# Patient Record
Sex: MALE | Race: WHITE | NOT HISPANIC OR LATINO | Employment: FULL TIME | URBAN - METROPOLITAN AREA
[De-identification: names, ages, dates, MRNs, and addresses within clinical notes are randomized per-mention and may not be internally consistent; named-entity substitution may affect disease eponyms.]

---

## 2020-02-28 ENCOUNTER — OFFICE VISIT (OUTPATIENT)
Dept: FAMILY MEDICINE | Facility: CLINIC | Age: 50
End: 2020-02-28
Payer: COMMERCIAL

## 2020-02-28 VITALS
HEART RATE: 95 BPM | WEIGHT: 221 LBS | OXYGEN SATURATION: 98 % | DIASTOLIC BLOOD PRESSURE: 122 MMHG | TEMPERATURE: 98 F | SYSTOLIC BLOOD PRESSURE: 196 MMHG | RESPIRATION RATE: 14 BRPM | BODY MASS INDEX: 36.82 KG/M2 | HEIGHT: 65 IN

## 2020-02-28 DIAGNOSIS — I10 HYPERTENSION, UNSPECIFIED TYPE: Primary | ICD-10-CM

## 2020-02-28 ASSESSMENT — MIFFLIN-ST. JEOR: SCORE: 1794.33

## 2020-02-28 NOTE — PROGRESS NOTES
Patient came in today for a blood pressure check. Pt complains of high blood pressure, blood in urine and nose bleeds off and on for the past two weeks. Pt states he has not been taking his blood pressure medication for over a year. Happens more when stressed at work. Pt denies any chest pain. After taking patients blood pressure (196/122) spoke with provider. Provider advised pt should go to the ER.     Informed pt that due to his elevated blood pressure. Pt understood and was informed [facility] would be the closest hospital.     Sandra Rose, CMA

## 2020-02-28 NOTE — NURSING NOTE
Patient came in today for a blood pressure check. Pt complains of high blood pressure, blood in urine and nose bleeds off and on for the past two weeks. Pt states he has not been taking his blood pressure medication for over a year. Happens more when stressed at work. After taking patients blood pressure (196/122) spoke with provider. Provider advised pt should go to the ER.     Informed pt that due to his elevated blood pressure. Pt understood and was informed [facility] would be the closest hospital.     Sandra Rose, CMA

## 2020-03-02 ENCOUNTER — HOSPITAL ENCOUNTER (EMERGENCY)
Facility: CLINIC | Age: 50
Discharge: HOME | End: 2020-03-02
Attending: FAMILY MEDICINE | Admitting: FAMILY MEDICINE
Payer: COMMERCIAL

## 2020-03-02 ENCOUNTER — APPOINTMENT (OUTPATIENT)
Dept: CT IMAGING | Facility: CLINIC | Age: 50
End: 2020-03-02
Attending: FAMILY MEDICINE
Payer: COMMERCIAL

## 2020-03-02 VITALS
RESPIRATION RATE: 22 BRPM | SYSTOLIC BLOOD PRESSURE: 176 MMHG | OXYGEN SATURATION: 100 % | TEMPERATURE: 96.8 F | HEART RATE: 86 BPM | DIASTOLIC BLOOD PRESSURE: 122 MMHG

## 2020-03-02 DIAGNOSIS — I10 BENIGN ESSENTIAL HYPERTENSION: ICD-10-CM

## 2020-03-02 DIAGNOSIS — I10 HYPERTENSION, UNSPECIFIED TYPE: ICD-10-CM

## 2020-03-02 DIAGNOSIS — R31.29 MICROSCOPIC HEMATURIA: ICD-10-CM

## 2020-03-02 DIAGNOSIS — N20.0 NEPHROLITHIASIS: ICD-10-CM

## 2020-03-02 LAB
ALBUMIN SERPL-MCNC: 4.1 G/DL (ref 3.4–5)
ALBUMIN UR-MCNC: 10 MG/DL
ALP SERPL-CCNC: 166 U/L (ref 40–150)
ALT SERPL W P-5'-P-CCNC: 26 U/L (ref 0–70)
ANION GAP SERPL CALCULATED.3IONS-SCNC: 7 MMOL/L (ref 3–14)
APPEARANCE UR: ABNORMAL
APTT PPP: 33 SEC (ref 22–37)
AST SERPL W P-5'-P-CCNC: 22 U/L (ref 0–45)
BACTERIA #/AREA URNS HPF: ABNORMAL /HPF
BASOPHILS # BLD AUTO: 0 10E9/L (ref 0–0.2)
BASOPHILS NFR BLD AUTO: 0.1 %
BILIRUB SERPL-MCNC: 0.4 MG/DL (ref 0.2–1.3)
BILIRUB UR QL STRIP: NEGATIVE
BUN SERPL-MCNC: 14 MG/DL (ref 7–30)
CALCIUM SERPL-MCNC: 9.1 MG/DL (ref 8.5–10.1)
CAOX CRY #/AREA URNS HPF: ABNORMAL /HPF
CHLORIDE SERPL-SCNC: 108 MMOL/L (ref 94–109)
CO2 SERPL-SCNC: 26 MMOL/L (ref 20–32)
COLOR UR AUTO: YELLOW
CREAT SERPL-MCNC: 0.6 MG/DL (ref 0.66–1.25)
DIFFERENTIAL METHOD BLD: NORMAL
EOSINOPHIL # BLD AUTO: 0.2 10E9/L (ref 0–0.7)
EOSINOPHIL NFR BLD AUTO: 2.1 %
ERYTHROCYTE [DISTWIDTH] IN BLOOD BY AUTOMATED COUNT: 13.5 % (ref 10–15)
GFR SERPL CREATININE-BSD FRML MDRD: >90 ML/MIN/{1.73_M2}
GLUCOSE SERPL-MCNC: 103 MG/DL (ref 70–99)
GLUCOSE UR STRIP-MCNC: NEGATIVE MG/DL
HCT VFR BLD AUTO: 46.4 % (ref 40–53)
HGB BLD-MCNC: 15.7 G/DL (ref 13.3–17.7)
HGB UR QL STRIP: ABNORMAL
IMM GRANULOCYTES # BLD: 0 10E9/L (ref 0–0.4)
IMM GRANULOCYTES NFR BLD: 0.3 %
INR PPP: 0.99 (ref 0.86–1.14)
KETONES UR STRIP-MCNC: NEGATIVE MG/DL
LEUKOCYTE ESTERASE UR QL STRIP: ABNORMAL
LYMPHOCYTES # BLD AUTO: 2.5 10E9/L (ref 0.8–5.3)
LYMPHOCYTES NFR BLD AUTO: 31.3 %
MCH RBC QN AUTO: 28.9 PG (ref 26.5–33)
MCHC RBC AUTO-ENTMCNC: 33.8 G/DL (ref 31.5–36.5)
MCV RBC AUTO: 86 FL (ref 78–100)
MONOCYTES # BLD AUTO: 0.4 10E9/L (ref 0–1.3)
MONOCYTES NFR BLD AUTO: 5.4 %
MUCOUS THREADS #/AREA URNS LPF: PRESENT /LPF
NEUTROPHILS # BLD AUTO: 4.8 10E9/L (ref 1.6–8.3)
NEUTROPHILS NFR BLD AUTO: 60.8 %
NITRATE UR QL: NEGATIVE
NRBC # BLD AUTO: 0 10*3/UL
NRBC BLD AUTO-RTO: 0 /100
PH UR STRIP: 5.5 PH (ref 5–7)
PLATELET # BLD AUTO: 240 10E9/L (ref 150–450)
POTASSIUM SERPL-SCNC: 4.3 MMOL/L (ref 3.4–5.3)
PROT SERPL-MCNC: 7.9 G/DL (ref 6.8–8.8)
RBC # BLD AUTO: 5.43 10E12/L (ref 4.4–5.9)
RBC #/AREA URNS AUTO: 107 /HPF (ref 0–2)
SODIUM SERPL-SCNC: 141 MMOL/L (ref 133–144)
SOURCE: ABNORMAL
SP GR UR STRIP: 1.02 (ref 1–1.03)
SQUAMOUS #/AREA URNS AUTO: 9 /HPF (ref 0–1)
UROBILINOGEN UR STRIP-MCNC: 2 MG/DL (ref 0–2)
WBC # BLD AUTO: 8 10E9/L (ref 4–11)
WBC #/AREA URNS AUTO: 35 /HPF (ref 0–5)

## 2020-03-02 PROCEDURE — 81001 URINALYSIS AUTO W/SCOPE: CPT | Performed by: FAMILY MEDICINE

## 2020-03-02 PROCEDURE — 99284 EMERGENCY DEPT VISIT MOD MDM: CPT | Mod: 25 | Performed by: FAMILY MEDICINE

## 2020-03-02 PROCEDURE — 80053 COMPREHEN METABOLIC PANEL: CPT | Performed by: FAMILY MEDICINE

## 2020-03-02 PROCEDURE — 99284 EMERGENCY DEPT VISIT MOD MDM: CPT | Mod: Z6 | Performed by: FAMILY MEDICINE

## 2020-03-02 PROCEDURE — 85025 COMPLETE CBC W/AUTO DIFF WBC: CPT | Performed by: FAMILY MEDICINE

## 2020-03-02 PROCEDURE — 85610 PROTHROMBIN TIME: CPT | Performed by: FAMILY MEDICINE

## 2020-03-02 PROCEDURE — 25000132 ZZH RX MED GY IP 250 OP 250 PS 637: Performed by: FAMILY MEDICINE

## 2020-03-02 PROCEDURE — 74176 CT ABD & PELVIS W/O CONTRAST: CPT

## 2020-03-02 PROCEDURE — 85730 THROMBOPLASTIN TIME PARTIAL: CPT | Performed by: FAMILY MEDICINE

## 2020-03-02 PROCEDURE — 96360 HYDRATION IV INFUSION INIT: CPT | Performed by: FAMILY MEDICINE

## 2020-03-02 PROCEDURE — 25800030 ZZH RX IP 258 OP 636: Performed by: FAMILY MEDICINE

## 2020-03-02 RX ORDER — METOPROLOL SUCCINATE 50 MG/1
50 TABLET, EXTENDED RELEASE ORAL DAILY
Qty: 30 TABLET | Refills: 0 | Status: SHIPPED | OUTPATIENT
Start: 2020-03-02 | End: 2020-07-16

## 2020-03-02 RX ORDER — METOPROLOL TARTRATE 25 MG/1
25 TABLET, FILM COATED ORAL ONCE
Status: COMPLETED | OUTPATIENT
Start: 2020-03-02 | End: 2020-03-02

## 2020-03-02 RX ORDER — CIPROFLOXACIN 500 MG/1
500 TABLET, FILM COATED ORAL 2 TIMES DAILY
Qty: 14 TABLET | Refills: 0 | Status: SHIPPED | OUTPATIENT
Start: 2020-03-02 | End: 2020-03-09

## 2020-03-02 RX ORDER — SODIUM CHLORIDE 9 MG/ML
1000 INJECTION, SOLUTION INTRAVENOUS CONTINUOUS
Status: DISCONTINUED | OUTPATIENT
Start: 2020-03-02 | End: 2020-03-02 | Stop reason: HOSPADM

## 2020-03-02 RX ADMIN — METOPROLOL TARTRATE 25 MG: 25 TABLET, FILM COATED ORAL at 17:49

## 2020-03-02 RX ADMIN — SODIUM CHLORIDE 1000 ML: 9 INJECTION, SOLUTION INTRAVENOUS at 16:37

## 2020-03-02 ASSESSMENT — ENCOUNTER SYMPTOMS
DIFFICULTY URINATING: 0
DYSURIA: 0
HEMATURIA: 1

## 2020-03-02 NOTE — ED PROVIDER NOTES
"    [facility] [place]    3/02/20     ED 18 4:03 PM   History     Chief Complaint   Patient presents with     Hematuria     The history is provided by the patient and medical records.     Raul Oates is a 49 year old male with history of hypertension, prior left hip replacement and distant history of kidney stones who presents with intermittent hematuria for the past 3 months. Patient has had urinary hesitancy with this but otherwise no difficulty with voiding. He has noticed the hematuria worsens with work related stress. His last episode of hematuria was on Thursday, 4 days ago.  He denies any flank pain or dysuria. No passage of clots in urine. He has been followed as an outpatient for hypertension and was on hypertensive medications but stopped taking them 1 year ago. He denies any particular reason, just didn't feel like taking them any further. He has been taking 400 mg aleve daily for past couple weeks due to left hip pain. Right now his urine is clear. No fevers, dysuria. He is not on any anticoagulation.     I have reviewed the Medications, Allergies, Past Medical and Surgical History, and Social History in the [employer] system.  PAST MEDICAL HISTORY:   Past Medical History:   Diagnosis Date     Epistaxis      Groin mass      Hip pain      Hypertension        PAST SURGICAL HISTORY:   Past Surgical History:   Procedure Laterality Date     ARTHROPLASTY HIP Left 10/12/2015    Procedure: ARTHROPLASTY HIP;  Surgeon: Hira Reilly MD;  Location: [facility] OR     ORTHOPEDIC SURGERY      right leg \"bone repair\"       FAMILY HISTORY:   Family History   Problem Relation Age of Onset     Unknown/Adopted Mother      Coronary Artery Disease Maternal Grandfather      Unknown/Adopted Father        SOCIAL HISTORY:   Social History     Tobacco Use     Smoking status: Never Smoker     Smokeless tobacco: Never Used   Substance Use Topics     Alcohol use: No     Alcohol/week: 0.0 standard drinks "       Discharge Medication List as of 3/2/2020  8:40 PM      START taking these medications    Details   ciprofloxacin (CIPRO) 500 MG tablet Take 1 tablet (500 mg) by mouth 2 times daily for 7 days, Disp-14 tablet, R-0, Local Print         CONTINUE these medications which have CHANGED    Details   metoprolol succinate ER (TOPROL-XL) 50 MG 24 hr tablet Take 1 tablet (50 mg) by mouth daily, Disp-30 tablet, R-0, Local Print         CONTINUE these medications which have NOT CHANGED    Details   acetaminophen 650 MG TABS Take 650 mg by mouth every 6 hours as needed for mild pain, Disp-100 tablet, R-0, E-Prescribe      diclofenac (VOLTAREN) 75 MG EC tablet Take 1 tablet (75 mg) by mouth 2 times daily, Disp-60 tablet, R-0, E-Prescribe      docusate sodium (COLACE) 100 MG capsule Take 1 capsule (100 mg) by mouth 2 times daily, Disp-60 capsule, R-0, E-PrescribeWhile on narcotic      ondansetron (ZOFRAN-ODT) 4 MG disintegrating tablet Take 1 tablet (4 mg) by mouth every 6 hours as needed for nausea, Disp-10 tablet, R-0, E-Prescribe                Allergies   Allergen Reactions     No Known Allergies         Review of Systems   Genitourinary: Positive for hematuria. Negative for difficulty urinating and dysuria.        Urinary hesitation   All other systems were reviewed and are negative      Physical Exam   BP: (!) 199/122  Pulse: 92  Temp: 96.8  F (36  C)  Resp: 22  SpO2: 100 %      Physical Exam  Constitutional:       General: He is not in acute distress.     Appearance: He is not diaphoretic.   HENT:      Head: Atraumatic.   Eyes:      General: No scleral icterus.     Pupils: Pupils are equal, round, and reactive to light.   Cardiovascular:      Heart sounds: Normal heart sounds.   Pulmonary:      Effort: No respiratory distress.      Breath sounds: Normal breath sounds.   Abdominal:      General: Bowel sounds are normal.      Palpations: Abdomen is soft.      Tenderness: There is no abdominal tenderness.    Genitourinary:     Penis: Normal.       Scrotum/Testes: Normal.      Prostate: Enlarged. Not tender.      Rectum: Normal.   Musculoskeletal:         General: No tenderness.   Skin:     General: Skin is warm.      Findings: No rash.   Neurological:      General: No focal deficit present.      Mental Status: He is oriented to person, place, and time.         ED Course        Procedures                           Results for orders placed or performed during the hospital encounter of 03/02/20 (from the past 24 hour(s))   UA with Microscopic   Result Value Ref Range    Color Urine Yellow     Appearance Urine Slightly Cloudy     Glucose Urine Negative NEG^Negative mg/dL    Bilirubin Urine Negative NEG^Negative    Ketones Urine Negative NEG^Negative mg/dL    Specific Gravity Urine 1.022 1.003 - 1.035    Blood Urine Moderate (A) NEG^Negative    pH Urine 5.5 5.0 - 7.0 pH    Protein Albumin Urine 10 (A) NEG^Negative mg/dL    Urobilinogen mg/dL 2.0 0.0 - 2.0 mg/dL    Nitrite Urine Negative NEG^Negative    Leukocyte Esterase Urine Moderate (A) NEG^Negative    Source Midstream Urine     WBC Urine 35 (H) 0 - 5 /HPF    RBC Urine 107 (H) 0 - 2 /HPF    Bacteria Urine Few (A) NEG^Negative /HPF    Squamous Epithelial /HPF Urine 9 (H) 0 - 1 /HPF    Mucous Urine Present (A) NEG^Negative /LPF    Calcium Oxalate Many (A) NEG^Negative /HPF   CBC with platelets differential   Result Value Ref Range    WBC 8.0 4.0 - 11.0 10e9/L    RBC Count 5.43 4.4 - 5.9 10e12/L    Hemoglobin 15.7 13.3 - 17.7 g/dL    Hematocrit 46.4 40.0 - 53.0 %    MCV 86 78 - 100 fl    MCH 28.9 26.5 - 33.0 pg    MCHC 33.8 31.5 - 36.5 g/dL    RDW 13.5 10.0 - 15.0 %    Platelet Count 240 150 - 450 10e9/L    Diff Method Automated Method     % Neutrophils 60.8 %    % Lymphocytes 31.3 %    % Monocytes 5.4 %    % Eosinophils 2.1 %    % Basophils 0.1 %    % Immature Granulocytes 0.3 %    Nucleated RBCs 0 0 /100    Absolute Neutrophil 4.8 1.6 - 8.3 10e9/L    Absolute  Lymphocytes 2.5 0.8 - 5.3 10e9/L    Absolute Monocytes 0.4 0.0 - 1.3 10e9/L    Absolute Eosinophils 0.2 0.0 - 0.7 10e9/L    Absolute Basophils 0.0 0.0 - 0.2 10e9/L    Abs Immature Granulocytes 0.0 0 - 0.4 10e9/L    Absolute Nucleated RBC 0.0    Comprehensive metabolic panel   Result Value Ref Range    Sodium 141 133 - 144 mmol/L    Potassium 4.3 3.4 - 5.3 mmol/L    Chloride 108 94 - 109 mmol/L    Carbon Dioxide 26 20 - 32 mmol/L    Anion Gap 7 3 - 14 mmol/L    Glucose 103 (H) 70 - 99 mg/dL    Urea Nitrogen 14 7 - 30 mg/dL    Creatinine 0.60 (L) 0.66 - 1.25 mg/dL    GFR Estimate >90 >60 mL/min/[1.73_m2]    GFR Estimate If Black >90 >60 mL/min/[1.73_m2]    Calcium 9.1 8.5 - 10.1 mg/dL    Bilirubin Total 0.4 0.2 - 1.3 mg/dL    Albumin 4.1 3.4 - 5.0 g/dL    Protein Total 7.9 6.8 - 8.8 g/dL    Alkaline Phosphatase 166 (H) 40 - 150 U/L    ALT 26 0 - 70 U/L    AST 22 0 - 45 U/L   INR   Result Value Ref Range    INR 0.99 0.86 - 1.14   Partial thromboplastin time   Result Value Ref Range    PTT 33 22 - 37 sec   CT Abdomen Pelvis w/o Contrast    Narrative    CT ABDOMEN PELVIS WITHOUT CONTRAST   3/2/2020 7:45 PM     HISTORY: Back pain. Hematuria.    TECHNIQUE: Volumetric helical sections were acquired from the lung  bases through the ischial tuberosities without IV contrast. Coronal  images were also reconstructed. Radiation dose for this scan was  reduced using automated exposure control, adjustment of the mA and/or  kV according to patient size, or iterative reconstruction technique.    COMPARISON: None.    FINDINGS: There are three nonobstructing stones in the left kidney,  with the largest within an upper pole calyx measuring 1 cm. No  ureteral calculi or hydronephrosis. Two right renal cysts are noted,  with the largest in the lower pole measuring 2.5 cm.    The liver, gallbladder, spleen, adrenal glands, and pancreas have  unremarkable noncontrast appearances. No bowel obstruction.  Unremarkable appendix. No convincing  evidence for colitis or  diverticulitis. There is mild central prostatic calcification. No free  fluid in the pelvis. The visualized lung bases are clear. Advanced  degenerative changes are noted in the right hip. Left total hip  arthroplasty. Mild degenerative changes in the visualized  thoracolumbar spine.      Impression    IMPRESSION:   1. There are three nonobstructing stones in the left kidney, with the  largest within an upper pole calyx measuring 1 cm.  2. No ureteral calculi or hydronephrosis.  3. Advanced degenerative changes right hip.       MIKE ANTHONY MD     Medications   0.9% sodium chloride BOLUS (0 mLs Intravenous Stopped 3/2/20 1751)     Followed by   sodium chloride 0.9% infusion (has no administration in time range)   metoprolol tartrate (LOPRESSOR) tablet 25 mg (25 mg Oral Given 3/2/20 1749)             Assessments & Plan (with Medical Decision Making)   49-year-old male with a history of hypertension is presenting with intermittent episodes of hematuria.  Differential diagnosis includes ureteral or nephrolithiasis, urinary tract infection or pyelonephritis, glomerulonephritis, interstitial cystitis, neoplasm of bladder, ureter or kidney, polyps of the bladder, prostatitis, vasculitis, primary coagulation disorder.  On exam he is quite hypertensive.  Mental status normal.  Funduscopic exam normal.  Neck supple.  Cardiovascular exam otherwise normal.  No significant flank or abdominal tenderness.  Prostate is slightly enlarged but not tender and his genitourinary exam is otherwise normal.  The remainder of a complete physical exam is normal.  He has evidence of hematuria and pyuria as well as protein and moderate leukocyte esterase on his urinalysis.  His creatinine is normal.  His CBC and coags are normal.  CT shows 3 large kidney stones in the left kidney but no ureteral or obstructing stone.  Otherwise negative.  Patient has a normal postvoid residual of the bladder.  He was given a dose  of metoprolol, which is his usual blood pressure medication.  He denies any pain currently his blood pressure is improved 170/112.  Patient has abnormal urinalysis which could be related to stone burden in the left kidney, versus prostatitis or urinary tract infection.  He also appears to have chronic untreated hypertension.    I will restart him on Toprol-XL which was his previous medication, treat with a course of Cipro, and refer to urology.  I have asked him to follow-up closely for blood pressure checks with his primary physician and return here if he develops any symptoms of hypertension such as chest pain, shortness of breath or other.  We discussed the indications for emergency department return and follow-up.  Stable for discharge.  I have reviewed the nursing notes.    I have reviewed the findings, diagnosis, plan and need for follow up with the patient.    Discharge Medication List as of 3/2/2020  8:40 PM      START taking these medications    Details   ciprofloxacin (CIPRO) 500 MG tablet Take 1 tablet (500 mg) by mouth 2 times daily for 7 days, Disp-14 tablet, R-0, Local Print             Final diagnoses:   Microscopic hematuria   Nephrolithiasis   Hypertension, unspecified type     I, Silvia Marc, am serving as a trained medical scribe to document services personally performed by Ceasar Triana MD based on the provider's statements to me on March 2, 2020.  This document has been checked and approved by the attending provider.    I, Ceasar Triana MD, was physically present and have reviewed and verified the accuracy of this note documented by Silvia Marc, medical scribe.     3/2/2020   [facility], [place], EMERGENCY DEPARTMENT     Ceasar Triana MD  03/02/20 2057

## 2020-03-02 NOTE — ED TRIAGE NOTES
Pt has been having hematuria since Friday and is noted to be hypertensive and states a hx of HTN but has been on the meds in years.

## 2020-03-02 NOTE — ED AVS SNAPSHOT
[facility], [place], Emergency Department  [address]  [facility] [address]  Phone:  [phone]  Fax:  [phone]                                    Raul Oates   MRN: [id number]    Department:  [facility], Emergency Department   Date of Visit:  3/2/2020           After Visit Summary Signature Page    I have received my discharge instructions, and my questions have been answered. I have discussed any challenges I see with this plan with the nurse or doctor.    ..........................................................................................................................................  Patient/Patient Representative Signature      ..........................................................................................................................................  Patient Representative Print Name and Relationship to Patient    ..................................................               ................................................  Date                                   Time    ..........................................................................................................................................  Reviewed by Signature/Title    ...................................................              ..............................................  Date                                               Time          22EPIC Rev 08/18

## 2020-03-03 NOTE — ED NOTES
ED discharge call complete. No further questions. Scheduling follow up and picking up medications today. Pt states all staff was very helpful and kind, no complaints.

## 2020-03-03 NOTE — DISCHARGE INSTRUCTIONS
Thank you for choosing [facility].     Please closely monitor for further symptoms. Return to the Emergency Department if you develop any new or worsening signs or symptoms.    If you received any opiate pain medications or sedatives during your visit, please do not drive for at least 8 hours.     Labs, cultures or final xray interpretations may still need to be reviewed.  We will call you if your plan of care needs to be changed.    Please follow up with your primary care physician or clinic in the next 5 to 7 days to recheck your blood pressure.    Please make an appointment to follow up with Urology Clinic (phone: [phone]) as soon as possible with respect to hematuria and kidney stones.

## 2020-07-16 ENCOUNTER — ANCILLARY PROCEDURE (OUTPATIENT)
Dept: GENERAL RADIOLOGY | Facility: CLINIC | Age: 50
End: 2020-07-16
Attending: PREVENTIVE MEDICINE
Payer: COMMERCIAL

## 2020-07-16 ENCOUNTER — OFFICE VISIT (OUTPATIENT)
Dept: ORTHOPEDICS | Facility: CLINIC | Age: 50
End: 2020-07-16
Payer: COMMERCIAL

## 2020-07-16 VITALS — BODY MASS INDEX: 36.82 KG/M2 | HEIGHT: 65 IN | WEIGHT: 221 LBS

## 2020-07-16 DIAGNOSIS — I10 BENIGN ESSENTIAL HYPERTENSION: ICD-10-CM

## 2020-07-16 DIAGNOSIS — M51.369 DDD (DEGENERATIVE DISC DISEASE), LUMBAR: ICD-10-CM

## 2020-07-16 DIAGNOSIS — M25.551 RIGHT HIP PAIN: Primary | ICD-10-CM

## 2020-07-16 DIAGNOSIS — M16.12 PRIMARY OSTEOARTHRITIS OF LEFT HIP: ICD-10-CM

## 2020-07-16 RX ORDER — METOPROLOL SUCCINATE 50 MG/1
50 TABLET, EXTENDED RELEASE ORAL DAILY
Qty: 30 TABLET | Refills: 0 | Status: SHIPPED | OUTPATIENT
Start: 2020-07-16 | End: 2020-10-14

## 2020-07-16 RX ORDER — DICLOFENAC SODIUM 75 MG/1
75 TABLET, DELAYED RELEASE ORAL 2 TIMES DAILY
Qty: 60 TABLET | Refills: 0 | Status: SHIPPED | OUTPATIENT
Start: 2020-07-16 | End: 2021-06-02

## 2020-07-16 ASSESSMENT — MIFFLIN-ST. JEOR: SCORE: 1794.33

## 2020-07-16 NOTE — PROGRESS NOTES
SPORTS & ORTHOPEDIC WALK-IN VISIT 7/16/2020    Primary Care Physician:      He started to have right hip pain that has continually gotten worse since his left hip replacement. The majority of his pain is in the posterior hip.     Reason for visit:     What part of your body is injured / painful?  right hip    What caused the injury /pain? Chronic    How long ago did your injury occur or pain begin? several years ago    What are your most bothersome symptoms? Pain    How would you characterize your symptom?  Sharp, stabbing    What makes your symptoms better? Nothing    What makes your symptoms worse? Standing, Walking, Sitting and Movement    Have you been previously seen for this problem? No    Medical History:    Any recent changes to your medical history? No    Any new medication prescribed since last visit? No    Have you had surgery on this body part before? No      Review of Systems:    Do you have fever, chills, weight loss? No    Do you have any vision problems? Yes, right eye blurry    Do you have any chest pain or edema? No    Do you have any shortness of breath or wheezing?  No    Do you have stomach problems? No    Do you have any numbness or focal weakness? No    Do you have diabetes? No    Do you have problems with bleeding or clotting? Yes, peeing blood    Do you have an rashes or other skin lesions? No       HISTORY OF PRESENT ILLNESS  Mr. Oates is a pleasant 49 year old year old male who presents to clinic today with right hip pain  Low back pain as well  Raul explains that it worsens the more he walks and stands, but also at nighttime  Location: right hip  Quality:  achy pain    Severity: 5/10 at worst    Duration: past year  Timing: occurs intermittently  Context: occurs while exercising and lifting  Modifying factors:  resting and non-use makes it better, movement and use makes it worse  Associated signs & symptoms: radiation into legs, worse on right    MEDICAL HISTORY  Patient  Active Problem List   Diagnosis     Hyperlipidemia LDL goal <160     History of total hip arthroplasty, left     Essential hypertension, benign     Hypertension       Current Outpatient Medications   Medication Sig Dispense Refill     acetaminophen 650 MG TABS Take 650 mg by mouth every 6 hours as needed for mild pain 100 tablet 0     diclofenac (VOLTAREN) 75 MG EC tablet Take 1 tablet (75 mg) by mouth 2 times daily 60 tablet 0     docusate sodium (COLACE) 100 MG capsule Take 1 capsule (100 mg) by mouth 2 times daily 60 capsule 0     metoprolol succinate ER (TOPROL-XL) 50 MG 24 hr tablet Take 1 tablet (50 mg) by mouth daily 30 tablet 0     tiZANidine (ZANAFLEX) 4 MG tablet Take 1-2 tablets (4-8 mg) by mouth nightly as needed 30 tablet 1     ondansetron (ZOFRAN-ODT) 4 MG disintegrating tablet Take 1 tablet (4 mg) by mouth every 6 hours as needed for nausea (Patient not taking: Reported on 2/28/2020) 10 tablet 0       Allergies   Allergen Reactions     No Known Allergies        Family History   Problem Relation Age of Onset     Unknown/Adopted Mother      Coronary Artery Disease Maternal Grandfather      Unknown/Adopted Father      Social History     Socioeconomic History     Marital status: Single     Spouse name: Not on file     Number of children: Not on file     Years of education: Not on file     Highest education level: Not on file   Occupational History     Not on file   Social Needs     Financial resource strain: Not on file     Food insecurity     Worry: Not on file     Inability: Not on file     Transportation needs     Medical: Not on file     Non-medical: Not on file   Tobacco Use     Smoking status: Never Smoker     Smokeless tobacco: Never Used   Substance and Sexual Activity     Alcohol use: No     Alcohol/week: 0.0 standard drinks     Drug use: No     Sexual activity: Not Currently   Lifestyle     Physical activity     Days per week: Not on file     Minutes per session: Not on file     Stress: Not on  "file   Relationships     Social connections     Talks on phone: Not on file     Gets together: Not on file     Attends Denominational service: Not on file     Active member of club or organization: Not on file     Attends meetings of clubs or organizations: Not on file     Relationship status: Not on file     Intimate partner violence     Fear of current or ex partner: Not on file     Emotionally abused: Not on file     Physically abused: Not on file     Forced sexual activity: Not on file   Other Topics Concern     Not on file   Social History Narrative     Not on file       Additional medical/Social/Surgical histories reviewed in [facility] and updated as appropriate.     REVIEW OF SYSTEMS (8/14/2020)  10 point ROS of systems including Constitutional, Eyes, Respiratory, Cardiovascular, Gastroenterology, Genitourinary, Integumentary, Musculoskeletal, Psychiatric, Allergic/Immunologic were all negative except for pertinent positives noted in my HPI.     PHYSICAL EXAM  Vitals:    07/16/20 1226   Weight: 100.2 kg (221 lb)   Height: 1.651 m (5' 5\")     Vital Signs: Ht 1.651 m (5' 5\")   Wt 100.2 kg (221 lb)   BMI 36.78 kg/m   Patient declined being weighed. Body mass index is 36.78 kg/m .    General  - normal appearance, in no obvious distress  HEENT  - conjunctivae not injected, moist mucous membranes, normocephalic/atraumatic head, ears normal appearance, no lesions, mouth normal appearance, no scars, normal dentition and teeth present  CV  - normal femoral pulse  Pulm  - normal respiratory pattern, non-labored  Musculoskeletal - right hip  - stance: gait slightly favors affected side, no obvious leg length discrepancy, normal heel and toe walk  - inspection: no swelling or effusion,  normal bone and joint alignment, no obvious deformity  - palpation: no lateral or anterior hip tenderness  - ROM: limited flexion and internal rotation secondary to pain, pain with passive and active ROM   - strength: 5/5 in all planes  - special " tests:  (-) DAISY  (-) FADIR  no pain with axial femoral load  Neuro  - no sensory or motor deficit, grossly normal coordination, normal muscle tone  Skin  - no ecchymosis, erythema, warmth, or induration, no obvious rash  Psych  - interactive, appropriate, normal mood and affect  Lumbar: has pain with flexion and extension, and with SLR on right    ASSESSMENT & PLAN  48 yo male with right hip pain due to some arthritis, and lumbar ddd, radicular pain  Reviewed xrays of hips: shows oa  Reviewed xrays lumbar: shows ddd  Ordered lumbar MRI  Consider KOURTNEY  Start voltaren and tizanadine  F/u after MRI    Ceasar Orantes MD, CAQSM

## 2020-07-16 NOTE — LETTER
[facility] SPORTS AND ORTHOPAEDIC WALK IN CLINIC  [address]  [address]  [facility] [id number] [phone]        July 16, 2020    Regarding:  Raul Oates  [address]  [facility] [address]              To Whom It May Concern;  Raul was seen in our clinic regarding an injury. He is allowed to work full hours but should avoid all work on stairs. If you have any questions or concerns please let us know.           Sincerely,        Ceasar Orantes MD

## 2020-07-16 NOTE — LETTER
7/16/2020         RE: Raul Oates  [address]  [facility] [address]        Dear Colleague,    Thank you for referring your patient, Raul Oates, to the [facility] SP[facility]TS AND ORTHOPAEDIC WALK IN CLINIC. Please see a copy of my visit note below.          SPORTS & ORTHOPEDIC WALK-IN VISIT 7/16/2020    Primary Care Physician:      Pal started to have right hip pain that has continually gotten worse since his left hip replacement. The majority of his pain is in the posterior hip.     Reason for visit:     What part of your body is injured / painful?  right hip    What caused the injury /pain? Chronic    How long ago did your injury occur or pain begin? several years ago    What are your most bothersome symptoms? Pain    How would you characterize your symptom?  Sharp, stabbing    What makes your symptoms better? Nothing    What makes your symptoms worse? Standing, Walking, Sitting and Movement    Have you been previously seen for this problem? No    Medical History:    Any recent changes to your medical history? No    Any new medication prescribed since last visit? No    Have you had surgery on this body part before? No      Review of Systems:    Do you have fever, chills, weight loss? No    Do you have any vision problems? Yes, right eye blurry    Do you have any chest pain or edema? No    Do you have any shortness of breath or wheezing?  No    Do you have stomach problems? No    Do you have any numbness or focal weakness? No    Do you have diabetes? No    Do you have problems with bleeding or clotting? Yes, peeing blood    Do you have an rashes or other skin lesions? No       HISTORY OF PRESENT ILLNESS  Mr. Oates is a pleasant 49 year old year old male who presents to clinic today with right hip pain  Low back pain as well  Raul explains that it worsens the more he walks and stands, but also at nighttime  Location: right hip  Quality:  achy pain    Severity: 5/10 at worst    Duration: past  year  Timing: occurs intermittently  Context: occurs while exercising and lifting  Modifying factors:  resting and non-use makes it better, movement and use makes it worse  Associated signs & symptoms: radiation into legs, worse on right    MEDICAL HISTORY  Patient Active Problem List   Diagnosis     Hyperlipidemia LDL goal <160     History of total hip arthroplasty, left     Essential hypertension, benign     Hypertension       Current Outpatient Medications   Medication Sig Dispense Refill     acetaminophen 650 MG TABS Take 650 mg by mouth every 6 hours as needed for mild pain 100 tablet 0     diclofenac (VOLTAREN) 75 MG EC tablet Take 1 tablet (75 mg) by mouth 2 times daily 60 tablet 0     docusate sodium (COLACE) 100 MG capsule Take 1 capsule (100 mg) by mouth 2 times daily 60 capsule 0     metoprolol succinate ER (TOPROL-XL) 50 MG 24 hr tablet Take 1 tablet (50 mg) by mouth daily 30 tablet 0     tiZANidine (ZANAFLEX) 4 MG tablet Take 1-2 tablets (4-8 mg) by mouth nightly as needed 30 tablet 1     ondansetron (ZOFRAN-ODT) 4 MG disintegrating tablet Take 1 tablet (4 mg) by mouth every 6 hours as needed for nausea (Patient not taking: Reported on 2/28/2020) 10 tablet 0       Allergies   Allergen Reactions     No Known Allergies        Family History   Problem Relation Age of Onset     Unknown/Adopted Mother      Coronary Artery Disease Maternal Grandfather      Unknown/Adopted Father      Social History     Socioeconomic History     Marital status: Single     Spouse name: Not on file     Number of children: Not on file     Years of education: Not on file     Highest education level: Not on file   Occupational History     Not on file   Social Needs     Financial resource strain: Not on file     Food insecurity     Worry: Not on file     Inability: Not on file     Transportation needs     Medical: Not on file     Non-medical: Not on file   Tobacco Use     Smoking status: Never Smoker     Smokeless tobacco: Never  "Used   Substance and Sexual Activity     Alcohol use: No     Alcohol/week: 0.0 standard drinks     Drug use: No     Sexual activity: Not Currently   Lifestyle     Physical activity     Days per week: Not on file     Minutes per session: Not on file     Stress: Not on file   Relationships     Social connections     Talks on phone: Not on file     Gets together: Not on file     Attends [employer] service: Not on file     Active member of club or organization: Not on file     Attends meetings of clubs or organizations: Not on file     Relationship status: Not on file     Intimate partner violence     Fear of current or ex partner: Not on file     Emotionally abused: Not on file     Physically abused: Not on file     Forced sexual activity: Not on file   Other Topics Concern     Not on file   Social History Narrative     Not on file       Additional medical/Social/Surgical histories reviewed in [facility] and updated as appropriate.     REVIEW OF SYSTEMS (8/14/2020)  10 point ROS of systems including Constitutional, Eyes, Respiratory, Cardiovascular, Gastroenterology, Genitourinary, Integumentary, Musculoskeletal, Psychiatric, Allergic/Immunologic were all negative except for pertinent positives noted in my HPI.     PHYSICAL EXAM  Vitals:    07/16/20 1226   Weight: 100.2 kg (221 lb)   Height: 1.651 m (5' 5\")     Vital Signs: Ht 1.651 m (5' 5\")   Wt 100.2 kg (221 lb)   BMI 36.78 kg/m   Patient declined being weighed. Body mass index is 36.78 kg/m .    General  - normal appearance, in no obvious distress  HEENT  - conjunctivae not injected, moist mucous membranes, normocephalic/atraumatic head, ears normal appearance, no lesions, mouth normal appearance, no scars, normal dentition and teeth present  CV  - normal femoral pulse  Pulm  - normal respiratory pattern, non-labored  Musculoskeletal - right hip  - stance: gait slightly favors affected side, no obvious leg length discrepancy, normal heel and toe walk  - inspection: no " swelling or effusion,  normal bone and joint alignment, no obvious deformity  - palpation: no lateral or anterior hip tenderness  - ROM: limited flexion and internal rotation secondary to pain, pain with passive and active ROM   - strength: 5/5 in all planes  - special tests:  (-) DAISY  (-) FADIR  no pain with axial femoral load  Neuro  - no sensory or motor deficit, grossly normal coordination, normal muscle tone  Skin  - no ecchymosis, erythema, warmth, or induration, no obvious rash  Psych  - interactive, appropriate, normal mood and affect  Lumbar: has pain with flexion and extension, and with SLR on right    ASSESSMENT & PLAN  50 yo male with right hip pain due to some arthritis, and lumbar ddd, radicular pain  Reviewed xrays of hips: shows oa  Reviewed xrays lumbar: shows ddd  Ordered lumbar MRI  Consider KOURTNEY  Start voltaren and tizanadine  F/u after MRI    Ceasar Orantes MD, CAQSM      Again, thank you for allowing me to participate in the care of your patient.        Sincerely,        Ceasar Orantes MD

## 2020-07-16 NOTE — LETTER
Date:August 17, 2020      Patient was self referred, no letter generated. Do not send.        [facility] Information

## 2020-07-24 ENCOUNTER — ANCILLARY PROCEDURE (OUTPATIENT)
Dept: MRI IMAGING | Facility: CLINIC | Age: 50
End: 2020-07-24
Attending: PREVENTIVE MEDICINE
Payer: COMMERCIAL

## 2020-07-24 DIAGNOSIS — M51.369 DDD (DEGENERATIVE DISC DISEASE), LUMBAR: ICD-10-CM

## 2020-08-20 ENCOUNTER — TELEPHONE (OUTPATIENT)
Dept: ORTHOPEDICS | Facility: CLINIC | Age: 50
End: 2020-08-20

## 2020-08-20 NOTE — TELEPHONE ENCOUNTER
[facility] Call Center    Phone Message    May a detailed message be left on voicemail: yes     Reason for Call: Requesting Results   Name/type of test: MRI   Date of test: 7/24/20  Was test done at a location other than [facility] (Please fill in the location if not [facility])?: No      Pt looking for results and to discuss next steps        Action Taken: Message routed to:  [facility]    Travel Screening: Not Applicable

## 2020-08-21 ENCOUNTER — OFFICE VISIT (OUTPATIENT)
Dept: ORTHOPEDICS | Facility: CLINIC | Age: 50
End: 2020-08-21
Payer: COMMERCIAL

## 2020-08-21 VITALS — BODY MASS INDEX: 36.82 KG/M2 | HEIGHT: 65 IN | WEIGHT: 221 LBS

## 2020-08-21 DIAGNOSIS — M51.369 DDD (DEGENERATIVE DISC DISEASE), LUMBAR: Primary | ICD-10-CM

## 2020-08-21 DIAGNOSIS — M16.11 PRIMARY OSTEOARTHRITIS OF RIGHT HIP: ICD-10-CM

## 2020-08-21 RX ORDER — GABAPENTIN 100 MG/1
100 CAPSULE ORAL 4 TIMES DAILY
Qty: 120 CAPSULE | Refills: 1 | Status: SHIPPED | OUTPATIENT
Start: 2020-08-21 | End: 2021-06-02

## 2020-08-21 RX ORDER — LIDOCAINE HYDROCHLORIDE 10 MG/ML
4 INJECTION, SOLUTION EPIDURAL; INFILTRATION; INTRACAUDAL; PERINEURAL
Status: SHIPPED | OUTPATIENT
Start: 2020-08-21

## 2020-08-21 RX ORDER — TRAMADOL HYDROCHLORIDE 50 MG/1
50 TABLET ORAL
Qty: 10 TABLET | Refills: 0 | Status: SHIPPED | OUTPATIENT
Start: 2020-08-21 | End: 2020-08-31

## 2020-08-21 RX ORDER — METHYLPREDNISOLONE 4 MG
TABLET, DOSE PACK ORAL
Qty: 21 TABLET | Refills: 0 | Status: SHIPPED | OUTPATIENT
Start: 2020-08-21 | End: 2020-10-28

## 2020-08-21 RX ORDER — TRIAMCINOLONE ACETONIDE 40 MG/ML
40 INJECTION, SUSPENSION INTRA-ARTICULAR; INTRAMUSCULAR
Status: SHIPPED | OUTPATIENT
Start: 2020-08-21

## 2020-08-21 RX ADMIN — LIDOCAINE HYDROCHLORIDE 4 ML: 10 INJECTION, SOLUTION EPIDURAL; INFILTRATION; INTRACAUDAL; PERINEURAL at 12:14

## 2020-08-21 RX ADMIN — TRIAMCINOLONE ACETONIDE 40 MG: 40 INJECTION, SUSPENSION INTRA-ARTICULAR; INTRAMUSCULAR at 12:14

## 2020-08-21 ASSESSMENT — MIFFLIN-ST. JEOR: SCORE: 1794.33

## 2020-08-21 NOTE — NURSING NOTE
[address] [address]  Dept: [phone]  ______________________________________________________________________________    Patient: Raul Oates   : 1970   MRN: [id number]   2020    INVASIVE PROCEDURE SAFETY CHECKLIST    Date: 20   Procedure:R Hip trochanteric bursa CSI with kenalog US guidede  Patient Name: Raul Oates  MRN: [id number]  YOB: 1970    Action: Complete sections as appropriate. Any discrepancy results in a HARD COPY until resolved.     PRE PROCEDURE:  Patient ID verified with 2 identifiers (name and  or MRN): Yes  Procedure and site verified with patient/designee (when able): Yes  Accurate consent documentation in medical record: Yes  H&P (or appropriate assessment) documented in medical record: Yes  H&P must be up to 20 days prior to procedure and updates within 24 hours of procedure as applicable: NA  Relevant diagnostic and radiology test results appropriately labeled and displayed as applicable: Yes  Procedure site(s) marked with provider initials: NA    TIMEOUT:  Time-Out performed immediately prior to starting procedure, including verbal and active participation of all team members addressing the following:Yes  * Correct patient identify  * Confirmed that the correct side and site are marked  * An accurate procedure consent form  * Agreement on the procedure to be done  * Correct patient position  * Relevant images and results are properly labeled and appropriately displayed  * The need to administer antibiotics or fluids for irrigation purposes during the procedure as applicable   * Safety precautions based on patient history or medication use    DURING PROCEDURE: Verification of correct person, site, and procedures any time the responsibility for care of the patient is transferred to another member of the care team.       Prior to injection, verified patient identity using patient's  name and date of birth.  Due to injection administration, patient instructed to remain in clinic for 15 minutes  afterwards, and to report any adverse reaction to me immediately.    Bursa injection was performed.      Drug Amount Wasted:  Yes: 1 mg/ml   Vial/Syringe: Single dose vial  Expiration Date:  2/24 - Lidocaine      Christiano Danay, [facility]  August 21, 2020

## 2020-08-21 NOTE — LETTER
Date:August 24, 2020      Patient was self referred, no letter generated. Do not send.        [facility] Information

## 2020-08-21 NOTE — LETTER
8/21/2020         RE: Raul Oates  [address]  [facility] [address]        Dear Colleague,    Thank you for referring your patient, Raul Oates, to the [facility] SPORTS AND ORTHOPAEDIC WALK IN CLINIC. Please see a copy of my visit note below.          SPORTS & ORTHOPEDIC WALK-IN FOLLOW-UP VISIT 8/21/2020    Interval History:     Follow up reason: Lumbar MRI f/u    Date of injury: NA    Date last seen: 7/16/20    Following Therapeutic Plan: Yes     Pain: Unchanged    Function: Unchanged  Medical History:    Any recent changes to your medical history? No    Any new medication prescribed since last visit? No  Review of Systems:    Do you have fever, chills, weight loss? No    Do you have any vision problems? Yes, right eye blurry    Do you have any chest pain or edema? No    Do you have any shortness of breath or wheezing?  No    Do you have stomach problems? No    Do you have any numbness or focal weakness? No    Do you have diabetes? No    Do you have problems with bleeding or clotting? Yes, peeing blood    Do you have an rashes or other skin lesions? No       Large Joint Injection/Arthocentesis: R hip joint    Date/Time: 8/21/2020 12:14 PM  Performed by: Ceasar Orantes MD  Authorized by: Ceasar Orantes MD     Indications:  Pain  Needle Size:  22 G  Guidance: ultrasound    Approach:  Posterolateral  Location:  Hip      Site:  R hip joint  Medications:  4 mL lidocaine (PF) 1 %; 40 mg triamcinolone 40 MG/ML  Outcome:  Tolerated well, no immediate complications  Procedure discussed: discussed risks, benefits, and alternatives    Consent Given by:  Patient  Timeout: timeout called immediately prior to procedure    Prep: patient was prepped and draped in usual sterile fashion     Scribed by Christiano Jon, ATC, ATC for Dr. Orantes on 8/21/20 at 12:00PM, based on the provider's statements to me.            Raul returns for right hip injection for arthritis as previously discussed. We  will review his lumbar MRI  As well  Diagnosis: right hip arthritis    Intraarticular right Hip Injection - Ultrasound Guided  The patient was informed of the risks and the benefits of the procedure and a written consent was signed.  The patient s hip was prepped with chlorhexidine in sterile fashion.   40 mg of triamcinolone suspension was drawn up into a 5 mL syringe with 4 mL of 1% lidocaine.  Injection was performed using sterile technique.  Under ultrasound guidance a 3.5-inch 22-gauge needle was used to enter the femoracetabular joint.  Anterior approach was used, needle placement was visualized and documented with ultrasound.  Ultrasound visualization was necessary due to decreased joint space in the setting of osteoarthritis.  Injection performed long axis to the probe.  Injection solution visualized within the joint space.  Images were permanently stored for the patient's record.  There were no complications. The patient tolerated the procedure well. There was negligible bleeding.   The patient was instructed to ice the hip upon leaving clinic and refrain from overuse over the next 3 days.   The patient was instructed to call or go to the emergency room with any unusual pain, swelling, redness, or if otherwise concerned.  I gave him medrol, gabapentin, and some PRN tramadol  Will f/u in 2 weeks and consider ordering KOURTNEY  And starting PT  Dr Orantes    Again, thank you for allowing me to participate in the care of your patient.        Sincerely,        Ceasar Orantes MD

## 2020-08-21 NOTE — PROGRESS NOTES
SPORTS & ORTHOPEDIC WALK-IN FOLLOW-UP VISIT 8/21/2020    Interval History:     Follow up reason: Lumbar MRI f/u    Date of injury: NA    Date last seen: 7/16/20    Following Therapeutic Plan: Yes     Pain: Unchanged    Function: Unchanged  Medical History:    Any recent changes to your medical history? No    Any new medication prescribed since last visit? No  Review of Systems:    Do you have fever, chills, weight loss? No    Do you have any vision problems? Yes, right eye blurry    Do you have any chest pain or edema? No    Do you have any shortness of breath or wheezing?  No    Do you have stomach problems? No    Do you have any numbness or focal weakness? No    Do you have diabetes? No    Do you have problems with bleeding or clotting? Yes, peeing blood    Do you have an rashes or other skin lesions? No       Large Joint Injection/Arthocentesis: R hip joint    Date/Time: 8/21/2020 12:14 PM  Performed by: Ceasar Orantes MD  Authorized by: eCasar Orantes MD     Indications:  Pain  Needle Size:  22 G  Guidance: ultrasound    Approach:  Posterolateral  Location:  Hip      Site:  R hip joint  Medications:  4 mL lidocaine (PF) 1 %; 40 mg triamcinolone 40 MG/ML  Outcome:  Tolerated well, no immediate complications  Procedure discussed: discussed risks, benefits, and alternatives    Consent Given by:  Patient  Timeout: timeout called immediately prior to procedure    Prep: patient was prepped and draped in usual sterile fashion     Scribed by Christiano Jon ATC, ATC for Dr. Orantes on 8/21/20 at 12:00PM, based on the provider's statements to me.

## 2020-08-21 NOTE — PROGRESS NOTES
Raul returns for right hip injection for arthritis as previously discussed. We will review his lumbar MRI  As well  Diagnosis: right hip arthritis    Intraarticular right Hip Injection - Ultrasound Guided  The patient was informed of the risks and the benefits of the procedure and a written consent was signed.  The patient s hip was prepped with chlorhexidine in sterile fashion.   40 mg of triamcinolone suspension was drawn up into a 5 mL syringe with 4 mL of 1% lidocaine.  Injection was performed using sterile technique.  Under ultrasound guidance a 3.5-inch 22-gauge needle was used to enter the femoracetabular joint.  Anterior approach was used, needle placement was visualized and documented with ultrasound.  Ultrasound visualization was necessary due to decreased joint space in the setting of osteoarthritis.  Injection performed long axis to the probe.  Injection solution visualized within the joint space.  Images were permanently stored for the patient's record.  There were no complications. The patient tolerated the procedure well. There was negligible bleeding.   The patient was instructed to ice the hip upon leaving clinic and refrain from overuse over the next 3 days.   The patient was instructed to call or go to the emergency room with any unusual pain, swelling, redness, or if otherwise concerned.  I gave him medrol, gabapentin, and some PRN tramadol  Will f/u in 2 weeks and consider ordering KOURTNEY  And starting PT  Dr Orantes

## 2020-09-01 DIAGNOSIS — M25.551 RIGHT HIP PAIN: ICD-10-CM

## 2020-09-01 DIAGNOSIS — M51.369 DDD (DEGENERATIVE DISC DISEASE), LUMBAR: ICD-10-CM

## 2020-09-04 ENCOUNTER — VIRTUAL VISIT (OUTPATIENT)
Dept: ORTHOPEDICS | Facility: CLINIC | Age: 50
End: 2020-09-04
Payer: COMMERCIAL

## 2020-09-04 DIAGNOSIS — M51.369 DDD (DEGENERATIVE DISC DISEASE), LUMBAR: Primary | ICD-10-CM

## 2020-09-04 DIAGNOSIS — M51.26 HERNIATED LUMBAR INTERVERTEBRAL DISC: ICD-10-CM

## 2020-09-04 RX ORDER — METHYLPREDNISOLONE 4 MG
TABLET, DOSE PACK ORAL
Qty: 21 TABLET | Refills: 0 | Status: SHIPPED | OUTPATIENT
Start: 2020-09-04 | End: 2020-10-28

## 2020-09-04 NOTE — LETTER
"    9/4/2020         RE: Raul Oates  [address] [place]  [facility] [address]        Dear Colleague,    Thank you for referring your patient, Raul Oates, to the [facility]. Please see a copy of my visit note below.    Raul Oates is a 49 year old male who is being evaluated via a billable video visit.      The patient has been notified of following:     \"This video visit will be conducted via a call between you and your physician/provider. We have found that certain health care needs can be provided without the need for an in-person physical exam.  This service lets us provide the care you need with a video conversation.  If a prescription is necessary we can send it directly to your pharmacy.  If lab work is needed we can place an order for that and you can then stop by our lab to have the test done at a later time.    Video visits are billed at different rates depending on your insurance coverage.  Please reach out to your insurance provider with any questions.    If during the course of the call the physician/provider feels a video visit is not appropriate, you will not be charged for this service.\"    Patient has given verbal consent for Video visit? Yes  How would you like to obtain your AVS? MyChart  If you are dropped from the video visit, the video invite should be resent to: Text to cell phone: 22  Will anyone else be joining your video visit? No      HISTORY OF PRESENT ILLNESS  Mr. Oates is a pleasant 49 year old year old male who presents to discuss his low back pain  Raul explains that he had his MRI and wants to review, and has had improvement from hip injection  Location: low back and right leg  Quality:  achy pain    Severity: 8/10 at worst    Duration: past 6 months  Timing: occurs intermittently  Context: occurs while exercising and lifting standing and walking  Modifying factors:  resting and non-use makes it better, movement and use makes it worse  Associated " signs & symptoms: radiation down right leg    MEDICAL HISTORY  Patient Active Problem List   Diagnosis     Hyperlipidemia LDL goal <160     History of total hip arthroplasty, left     Essential hypertension, benign     Hypertension       Current Outpatient Medications   Medication Sig Dispense Refill     acetaminophen 650 MG TABS Take 650 mg by mouth every 6 hours as needed for mild pain 100 tablet 0     diclofenac (VOLTAREN) 75 MG EC tablet Take 1 tablet (75 mg) by mouth 2 times daily 60 tablet 0     docusate sodium (COLACE) 100 MG capsule Take 1 capsule (100 mg) by mouth 2 times daily 60 capsule 0     gabapentin (NEURONTIN) 100 MG capsule Take 1 capsule (100 mg) by mouth 4 times daily 120 capsule 1     methylPREDNISolone (MEDROL DOSEPAK) 4 MG tablet therapy pack Follow Package Directions 21 tablet 0     methylPREDNISolone (MEDROL DOSEPAK) 4 MG tablet therapy pack Follow Package Directions 21 tablet 0     metoprolol succinate ER (TOPROL-XL) 50 MG 24 hr tablet Take 1 tablet (50 mg) by mouth daily 30 tablet 0     ondansetron (ZOFRAN-ODT) 4 MG disintegrating tablet Take 1 tablet (4 mg) by mouth every 6 hours as needed for nausea 10 tablet 0     tiZANidine (ZANAFLEX) 4 MG tablet Take 1-2 tablets (4-8 mg) by mouth nightly as needed 30 tablet 1       Allergies   Allergen Reactions     No Known Allergies        Family History   Problem Relation Age of Onset     Unknown/Adopted Mother      Coronary Artery Disease Maternal Grandfather      Unknown/Adopted Father      Social History     Socioeconomic History     Marital status: Single     Spouse name: Not on file     Number of children: Not on file     Years of education: Not on file     Highest education level: Not on file   Occupational History     Not on file   Social Needs     Financial resource strain: Not on file     Food insecurity     Worry: Not on file     Inability: Not on file     Transportation needs     Medical: Not on file     Non-medical: Not on file   Tobacco  Use     Smoking status: Never Smoker     Smokeless tobacco: Never Used   Substance and Sexual Activity     Alcohol use: No     Alcohol/week: 0.0 standard drinks     Drug use: No     Sexual activity: Not Currently   Lifestyle     Physical activity     Days per week: Not on file     Minutes per session: Not on file     Stress: Not on file   Relationships     Social connections     Talks on phone: Not on file     Gets together: Not on file     Attends [employer] service: Not on file     Active member of club or organization: Not on file     Attends meetings of clubs or organizations: Not on file     Relationship status: Not on file     Intimate partner violence     Fear of current or ex partner: Not on file     Emotionally abused: Not on file     Physically abused: Not on file     Forced sexual activity: Not on file   Other Topics Concern     Not on file   Social History Narrative     Not on file       Additional medical/Social/Surgical histories reviewed in [facility] and updated as appropriate.     REVIEW OF SYSTEMS (9/4/2020)  10 point ROS of systems including Constitutional, Eyes, Respiratory, Cardiovascular, Gastroenterology, Genitourinary, Integumentary, Musculoskeletal, Psychiatric, Allergic/Immunologic were all negative except for pertinent positives noted in my HPI.     PHYSICAL EXAM    General  - normal appearance, in no obvious distress  HEENT  - conjunctivae not injected, moist mucous membranes, normocephalic/atraumatic head, ears normal appearance, no lesions, mouth normal appearance, no scars, normal dentition and teeth present  CV  - normal peripheral perfusion  Pulm  - normal respiratory pattern, non-labored  Musculoskeletal - lumbar spine  - stance: normal gait without limp, no obvious leg length discrepancy, normal heel and toe walk  - inspection: normal bone and joint alignment, no obvious scoliosis    - ROM: flexion exacerbates pain, normal extension, sidebending, rotation with some pain    Neuro  grossly  normal coordination, normal muscle tone  Skin  - no ecchymosis, erythema, warmth, or induration, no obvious rash  Psych  - interactive, appropriate, normal mood and affect  ASSESSMENT & PLAN  50 yo male with right trochanteric bursitis, stable, and lumbar ddd, disc herniation, not resolved  Reviewed lumbar MRI: shows disc herniations, ddd  Ordered KOURTNEY  Start medrol  Cont. HEP  F/u after KOURTNEY  Consider PT  Cont. voltaren and tizanadine    Ceasar Orantes MD, CAM    Video-Visit Details    Type of service:  Video Visit    Video Start Time: 1:15 PM  Video End Time: 1:28 PM    Originating Location (pt. Location): Home    Distant Location (provider location):  [facility]     Platform used for Video Visit: [facility]    Ceasar Orantes MD        Again, thank you for allowing me to participate in the care of your patient.        Sincerely,        Ceasar Orantes MD

## 2020-09-04 NOTE — NURSING NOTE
Reason For Visit:   Chief Complaint   Patient presents with     RECHECK     2 wk f/u R hip bursa CSI & requesting KOURTNEY        There were no vitals taken for this visit.    Pain Assessment  Patient Currently in Pain: Yes  0-10 Pain Scale: 9  Primary Pain Location: Leg(leg, hip and back)    Susie Lee ATC

## 2020-09-04 NOTE — PROGRESS NOTES
"Raul Oates is a 49 year old male who is being evaluated via a billable video visit.      The patient has been notified of following:     \"This video visit will be conducted via a call between you and your physician/provider. We have found that certain health care needs can be provided without the need for an in-person physical exam.  This service lets us provide the care you need with a video conversation.  If a prescription is necessary we can send it directly to your pharmacy.  If lab work is needed we can place an order for that and you can then stop by our lab to have the test done at a later time.    Video visits are billed at different rates depending on your insurance coverage.  Please reach out to your insurance provider with any questions.    If during the course of the call the physician/provider feels a video visit is not appropriate, you will not be charged for this service.\"    Patient has given verbal consent for Video visit? Yes  How would you like to obtain your AVS? MyChart  If you are dropped from the video visit, the video invite should be resent to: Text to cell phone: 22  Will anyone else be joining your video visit? No      HISTORY OF PRESENT ILLNESS  Mr. Oates is a pleasant 49 year old year old male who presents to discuss his low back pain  Raul explains that he had his MRI and wants to review, and has had improvement from hip injection  Location: low back and right leg  Quality:  achy pain    Severity: 8/10 at worst    Duration: past 6 months  Timing: occurs intermittently  Context: occurs while exercising and lifting standing and walking  Modifying factors:  resting and non-use makes it better, movement and use makes it worse  Associated signs & symptoms: radiation down right leg    MEDICAL HISTORY  Patient Active Problem List   Diagnosis     Hyperlipidemia LDL goal <160     History of total hip arthroplasty, left     Essential hypertension, benign     Hypertension       Current " Outpatient Medications   Medication Sig Dispense Refill     acetaminophen 650 MG TABS Take 650 mg by mouth every 6 hours as needed for mild pain 100 tablet 0     diclofenac (VOLTAREN) 75 MG EC tablet Take 1 tablet (75 mg) by mouth 2 times daily 60 tablet 0     docusate sodium (COLACE) 100 MG capsule Take 1 capsule (100 mg) by mouth 2 times daily 60 capsule 0     gabapentin (NEURONTIN) 100 MG capsule Take 1 capsule (100 mg) by mouth 4 times daily 120 capsule 1     methylPREDNISolone (MEDROL DOSEPAK) 4 MG tablet therapy pack Follow Package Directions 21 tablet 0     methylPREDNISolone (MEDROL DOSEPAK) 4 MG tablet therapy pack Follow Package Directions 21 tablet 0     metoprolol succinate ER (TOPROL-XL) 50 MG 24 hr tablet Take 1 tablet (50 mg) by mouth daily 30 tablet 0     ondansetron (ZOFRAN-ODT) 4 MG disintegrating tablet Take 1 tablet (4 mg) by mouth every 6 hours as needed for nausea 10 tablet 0     tiZANidine (ZANAFLEX) 4 MG tablet Take 1-2 tablets (4-8 mg) by mouth nightly as needed 30 tablet 1       Allergies   Allergen Reactions     No Known Allergies        Family History   Problem Relation Age of Onset     Unknown/Adopted Mother      Coronary Artery Disease Maternal Grandfather      Unknown/Adopted Father      Social History     Socioeconomic History     Marital status: Single     Spouse name: Not on file     Number of children: Not on file     Years of education: Not on file     Highest education level: Not on file   Occupational History     Not on file   Social Needs     Financial resource strain: Not on file     Food insecurity     Worry: Not on file     Inability: Not on file     Transportation needs     Medical: Not on file     Non-medical: Not on file   Tobacco Use     Smoking status: Never Smoker     Smokeless tobacco: Never Used   Substance and Sexual Activity     Alcohol use: No     Alcohol/week: 0.0 standard drinks     Drug use: No     Sexual activity: Not Currently   Lifestyle     Physical  activity     Days per week: Not on file     Minutes per session: Not on file     Stress: Not on file   Relationships     Social connections     Talks on phone: Not on file     Gets together: Not on file     Attends Religion service: Not on file     Active member of club or organization: Not on file     Attends meetings of clubs or organizations: Not on file     Relationship status: Not on file     Intimate partner violence     Fear of current or ex partner: Not on file     Emotionally abused: Not on file     Physically abused: Not on file     Forced sexual activity: Not on file   Other Topics Concern     Not on file   Social History Narrative     Not on file       Additional medical/Social/Surgical histories reviewed in [facility] and updated as appropriate.     REVIEW OF SYSTEMS (9/4/2020)  10 point ROS of systems including Constitutional, Eyes, Respiratory, Cardiovascular, Gastroenterology, Genitourinary, Integumentary, Musculoskeletal, Psychiatric, Allergic/Immunologic were all negative except for pertinent positives noted in my HPI.     PHYSICAL EXAM    General  - normal appearance, in no obvious distress  HEENT  - conjunctivae not injected, moist mucous membranes, normocephalic/atraumatic head, ears normal appearance, no lesions, mouth normal appearance, no scars, normal dentition and teeth present  CV  - normal peripheral perfusion  Pulm  - normal respiratory pattern, non-labored  Musculoskeletal - lumbar spine  - stance: normal gait without limp, no obvious leg length discrepancy, normal heel and toe walk  - inspection: normal bone and joint alignment, no obvious scoliosis    - ROM: flexion exacerbates pain, normal extension, sidebending, rotation with some pain    Neuro  grossly normal coordination, normal muscle tone  Skin  - no ecchymosis, erythema, warmth, or induration, no obvious rash  Psych  - interactive, appropriate, normal mood and affect  ASSESSMENT & PLAN  48 yo male with right trochanteric bursitis,  stable, and lumbar ddd, disc herniation, not resolved  Reviewed lumbar MRI: shows disc herniations, ddd  Ordered KOURTNEY  Start medrol  Cont. HEP  F/u after KOURTNEY  Consider PT  Cont. voltaren and tizanadine    Ceasar Orantes MD, CAQSM    Video-Visit Details    Type of service:  Video Visit    Video Start Time: 1:15 PM  Video End Time: 1:28 PM    Originating Location (pt. Location): Home    Distant Location (provider location):  [facility]     Platform used for Video Visit: [facility]    Ceasar Orantes MD

## 2020-09-04 NOTE — LETTER
Date:September 9, 2020      Patient was self referred, no letter generated. Do not send.        [facility] Information

## 2020-09-05 DIAGNOSIS — I10 BENIGN ESSENTIAL HYPERTENSION: ICD-10-CM

## 2020-09-08 RX ORDER — METOPROLOL SUCCINATE 50 MG/1
TABLET, EXTENDED RELEASE ORAL
Qty: 30 TABLET | Refills: 0 | OUTPATIENT
Start: 2020-09-08

## 2020-09-10 DIAGNOSIS — Z11.59 ENCOUNTER FOR SCREENING FOR OTHER VIRAL DISEASES: Primary | ICD-10-CM

## 2020-09-20 DIAGNOSIS — M51.369 DDD (DEGENERATIVE DISC DISEASE), LUMBAR: ICD-10-CM

## 2020-09-20 DIAGNOSIS — M25.551 RIGHT HIP PAIN: ICD-10-CM

## 2020-10-09 DIAGNOSIS — Z11.59 ENCOUNTER FOR SCREENING FOR OTHER VIRAL DISEASES: ICD-10-CM

## 2020-10-09 PROCEDURE — 99000 SPECIMEN HANDLING OFFICE-LAB: CPT | Performed by: PATHOLOGY

## 2020-10-09 PROCEDURE — U0003 INFECTIOUS AGENT DETECTION BY NUCLEIC ACID (DNA OR RNA); SEVERE ACUTE RESPIRATORY SYNDROME CORONAVIRUS 2 (SARS-COV-2) (CORONAVIRUS DISEASE [COVID-19]), AMPLIFIED PROBE TECHNIQUE, MAKING USE OF HIGH THROUGHPUT TECHNOLOGIES AS DESCRIBED BY CMS-2020-01-R: HCPCS | Mod: 90 | Performed by: PATHOLOGY

## 2020-10-10 LAB
SARS-COV-2 RNA SPEC QL NAA+PROBE: NOT DETECTED
SPECIMEN SOURCE: NORMAL

## 2020-10-12 ENCOUNTER — OFFICE VISIT (OUTPATIENT)
Dept: FAMILY MEDICINE | Facility: CLINIC | Age: 50
End: 2020-10-12
Payer: COMMERCIAL

## 2020-10-12 ENCOUNTER — ANCILLARY PROCEDURE (OUTPATIENT)
Dept: GENERAL RADIOLOGY | Facility: CLINIC | Age: 50
End: 2020-10-12
Attending: PREVENTIVE MEDICINE
Payer: COMMERCIAL

## 2020-10-12 VITALS
HEART RATE: 95 BPM | DIASTOLIC BLOOD PRESSURE: 150 MMHG | OXYGEN SATURATION: 96 % | RESPIRATION RATE: 18 BRPM | TEMPERATURE: 98.6 F | SYSTOLIC BLOOD PRESSURE: 181 MMHG

## 2020-10-12 VITALS
SYSTOLIC BLOOD PRESSURE: 183 MMHG | OXYGEN SATURATION: 94 % | DIASTOLIC BLOOD PRESSURE: 122 MMHG | HEART RATE: 83 BPM | TEMPERATURE: 98.5 F

## 2020-10-12 DIAGNOSIS — E66.01 MORBID OBESITY (H): ICD-10-CM

## 2020-10-12 DIAGNOSIS — M51.26 HERNIATED LUMBAR INTERVERTEBRAL DISC: ICD-10-CM

## 2020-10-12 DIAGNOSIS — M51.369 DDD (DEGENERATIVE DISC DISEASE), LUMBAR: ICD-10-CM

## 2020-10-12 DIAGNOSIS — Z13.6 SCREENING FOR HEART DISEASE: ICD-10-CM

## 2020-10-12 DIAGNOSIS — I10 BENIGN ESSENTIAL HYPERTENSION: Primary | ICD-10-CM

## 2020-10-12 DIAGNOSIS — I10 BENIGN ESSENTIAL HYPERTENSION: ICD-10-CM

## 2020-10-12 LAB
ALBUMIN SERPL-MCNC: 3.7 G/DL (ref 3.4–5)
ALP SERPL-CCNC: 162 U/L (ref 40–150)
ALT SERPL W P-5'-P-CCNC: 48 U/L (ref 0–70)
ANION GAP SERPL CALCULATED.3IONS-SCNC: 5 MMOL/L (ref 3–14)
AST SERPL W P-5'-P-CCNC: 32 U/L (ref 0–45)
BILIRUB SERPL-MCNC: 0.5 MG/DL (ref 0.2–1.3)
BUN SERPL-MCNC: 12 MG/DL (ref 7–30)
CALCIUM SERPL-MCNC: 9.3 MG/DL (ref 8.5–10.1)
CHLORIDE SERPL-SCNC: 102 MMOL/L (ref 94–109)
CHOLEST SERPL-MCNC: 208 MG/DL
CO2 SERPL-SCNC: 28 MMOL/L (ref 20–32)
CREAT SERPL-MCNC: 0.78 MG/DL (ref 0.66–1.25)
GFR SERPL CREATININE-BSD FRML MDRD: >90 ML/MIN/{1.73_M2}
GLUCOSE SERPL-MCNC: 118 MG/DL (ref 70–99)
HBA1C MFR BLD: 5.7 % (ref 0–5.6)
HDLC SERPL-MCNC: 71 MG/DL
LDLC SERPL CALC-MCNC: 124 MG/DL
NONHDLC SERPL-MCNC: 137 MG/DL
POTASSIUM SERPL-SCNC: 4.6 MMOL/L (ref 3.4–5.3)
PROT SERPL-MCNC: 8 G/DL (ref 6.8–8.8)
SODIUM SERPL-SCNC: 135 MMOL/L (ref 133–144)
TRIGL SERPL-MCNC: 62 MG/DL

## 2020-10-12 PROCEDURE — 80061 LIPID PANEL: CPT | Performed by: PATHOLOGY

## 2020-10-12 PROCEDURE — 99213 OFFICE O/P EST LOW 20 MIN: CPT | Performed by: NURSE PRACTITIONER

## 2020-10-12 PROCEDURE — 83036 HEMOGLOBIN GLYCOSYLATED A1C: CPT | Performed by: PATHOLOGY

## 2020-10-12 PROCEDURE — 80053 COMPREHEN METABOLIC PANEL: CPT | Performed by: PATHOLOGY

## 2020-10-12 PROCEDURE — 62323 NJX INTERLAMINAR LMBR/SAC: CPT | Performed by: RADIOLOGY

## 2020-10-12 RX ORDER — METHYLPREDNISOLONE ACETATE 80 MG/ML
80 INJECTION, SUSPENSION INTRA-ARTICULAR; INTRALESIONAL; INTRAMUSCULAR; SOFT TISSUE ONCE
Status: COMPLETED | OUTPATIENT
Start: 2020-10-12 | End: 2020-10-12

## 2020-10-12 RX ORDER — LISINOPRIL/HYDROCHLOROTHIAZIDE 10-12.5 MG
1 TABLET ORAL DAILY
Qty: 30 TABLET | Refills: 0 | Status: SHIPPED | OUTPATIENT
Start: 2020-10-12 | End: 2020-10-14 | Stop reason: DRUGHIGH

## 2020-10-12 RX ORDER — LIDOCAINE HYDROCHLORIDE 10 MG/ML
30 INJECTION, SOLUTION EPIDURAL; INFILTRATION; INTRACAUDAL; PERINEURAL ONCE
Status: COMPLETED | OUTPATIENT
Start: 2020-10-12 | End: 2020-10-12

## 2020-10-12 RX ORDER — BUPIVACAINE HYDROCHLORIDE 5 MG/ML
10 INJECTION, SOLUTION PERINEURAL ONCE
Status: COMPLETED | OUTPATIENT
Start: 2020-10-12 | End: 2020-10-12

## 2020-10-12 RX ORDER — IOPAMIDOL 408 MG/ML
20 INJECTION, SOLUTION INTRATHECAL ONCE
Status: COMPLETED | OUTPATIENT
Start: 2020-10-12 | End: 2020-10-12

## 2020-10-12 RX ADMIN — METHYLPREDNISOLONE ACETATE 80 MG: 80 INJECTION, SUSPENSION INTRA-ARTICULAR; INTRALESIONAL; INTRAMUSCULAR; SOFT TISSUE at 10:31

## 2020-10-12 RX ADMIN — IOPAMIDOL 4 ML: 408 INJECTION, SOLUTION INTRATHECAL at 10:31

## 2020-10-12 RX ADMIN — LIDOCAINE HYDROCHLORIDE 5 ML: 10 INJECTION, SOLUTION EPIDURAL; INFILTRATION; INTRACAUDAL; PERINEURAL at 10:31

## 2020-10-12 RX ADMIN — BUPIVACAINE HYDROCHLORIDE 10 MG: 5 INJECTION, SOLUTION PERINEURAL at 10:31

## 2020-10-12 ASSESSMENT — PAIN SCALES - GENERAL: PAINLEVEL: NO PAIN (0)

## 2020-10-12 ASSESSMENT — ENCOUNTER SYMPTOMS
FEVER: 0
FATIGUE: 1
CHILLS: 0

## 2020-10-12 NOTE — DISCHARGE INSTRUCTIONS
Discharge Instructions for Epidural Steroid Injection/Nerve Block    Care of injection site:    If you have new numbness down your leg after the injection, this may last up to 4-6 hours, but should go away. You may need help with walking until your leg feels normal.    Over the next 24 to 48 hours, pain at the injection site may increase before it gets better.    For the next 48 hours, use ice packs for 15 minutes,3-4 times a day for pain relief.    If you have a bandage, you may remove it the next morning     Do not submerge injection site in water for 24 hours, (no baths. pools, hot tubs). Showers are OK.            Activity:    You should relax today and then you may return to your regular activity tomorrow.    You may eat a normal diet.          If you received steroids: The steroid should help reduce swelling and pain. This may take from 3-14 days. Pain from the shot will be mild and go away in 2-3 days.       DO NOT GET A FLU SHOT FOR AT LEAST 1 WEEK.      Common side effects may include:    Insomnia    Heartburn    Flushed face    Water retention    Increased appetite    Increased blood sugar      If you have diabetes, watch your blood sugar closely. If needed, call your doctor to help control your blood sugar.        Call your doctor or go to the Emergency Room if you have new severe pain or fever.

## 2020-10-12 NOTE — PATIENT INSTRUCTIONS

## 2020-10-12 NOTE — NURSING NOTE
Chief Complaint   Patient presents with     Hypertension     Pt is here to discuss hypertension.     Establish Care     Pt is here to establish care.     Depression Response    Patient completed the PHQ-9 assessment for depression and scored >9? No  Question 9 on the PHQ-9 was positive for suicidality? No  Does patient have current mental health provider? No    Is this a virtual visit? No    I personally notified the following: visit provider      IZAIAH Reynolds 1:01 PM  10/12/2020

## 2020-10-12 NOTE — PROGRESS NOTES
Pt here for his injection and noted to have extremely high blood pressure.  Pt says this has been a problem for a couple months and was able to get BP meds from Dr. Orantes initially a couple months ago.    Pt did get his injection.  There was concern with his BP remaining elevated.  Clinic staff arranged for the pt to be seen in the NP clinic today.  Since he could get a same day appt we didn't feel the need to recommend the pt to the ER at this point.  Pt very agreeable to going to the NP clinic appt, it was services he wanted to initiate anyway.    Pt given all his clinic appt information and AVS for his injection.  Dr. Jaquez agreeable with this plan.    Luz Marina COY, RN, BSN, PHN

## 2020-10-12 NOTE — PROGRESS NOTES
"       HPI       Raul Oates is a 50 year old man who presents with hypertension.   Chief Complaint   Patient presents with     Hypertension     Pt is here to discuss hypertension.     Establish Care     Pt is here to establish care.     Raul is new to our  clinic. He lives with some roommates. His friend, Dane usually takes care of him. He has a daughter, 27 living in [place]. He is working for [employer]ilities [employer] for [employer] in [place]. He is as sports fan, however he has not been doing much due to the current Pandemic. He eats a poor diet and drinks sugary sodas, coffees and gatorades.   Raul has not had a physical exam in quite some time. He does not have a primary provider. He has been working with Orthopedics on his back pain, however, he has \"not been taking very good care of himself.\" Raul knows he needs to start eating healthier and lose some weight.   Blood pressure has been elevated for \"quite some time.\" BP up at 196/122 in February of 2020. He denies chest pain, shortness of breath, or any other symptoms.    Problem, Medication and Allergy Lists were reviewed and updated if needed.    Patient is not an established patient of this clinic.         Review of Systems:   Review of Systems     Constitutional:  Positive for fatigue. Negative for fever and chills.               Physical Exam:     Vitals:    10/12/20 1256 10/12/20 1259   BP: (!) 183/131 (!) 183/122   Pulse: 83    Temp: 98.5  F (36.9  C)    TempSrc: Oral    SpO2: 94%    242.6 standing scale    There is no height or weight on file to calculate BMI.     Vitals were reviewed and were normal.     Physical Exam  Vitals signs reviewed.   Constitutional:       Appearance: Normal appearance.   HENT:      Head: Normocephalic.   Cardiovascular:      Rate and Rhythm: Normal rate and regular rhythm.      Pulses: Normal pulses.      Heart sounds: Normal heart sounds.   Pulmonary:      Effort: Pulmonary effort is normal.      Breath sounds: " Normal breath sounds.   Skin:     General: Skin is warm and dry.   Neurological:      General: No focal deficit present.      Mental Status: He is alert and oriented to person, place, and time.   Psychiatric:         Mood and Affect: Mood normal.         Behavior: Behavior normal.         Results:   Labs pending.   Assessment and Plan     1. Benign essential hypertension    - Comprehensive metabolic panel; Future  - lisinopril-hydrochlorothiazide (ZESTORETIC) 10-12.5 MG tablet; Take 1 tablet by mouth daily  Dispense: 30 tablet; Refill: 0    2. Morbid obesity (H)    - Hemoglobin A1c; Future  - Comprehensive metabolic panel; Future    3. Screening for heart disease    - Lipid Profile FASTING; Future      There are no discontinued medications.    First, Raul will start on a blood pressure medication. Next, he will have fasting labs today. Next, he will return in 2 days for an annual physical. Finally, education provided on the importance of getting his blood pressure down. If he develops chest pain or shortness of breath, he will present to the ER. Options for treatment and follow-up care were reviewed with the patient. Raul Oates engaged in the decision making process and verbalized understanding of the options discussed and agreed with the final plan.  Xochitl Nolan, BOBBY, CNP

## 2020-10-13 ENCOUNTER — DOCUMENTATION ONLY (OUTPATIENT)
Dept: CARE COORDINATION | Facility: CLINIC | Age: 50
End: 2020-10-13

## 2020-10-14 ENCOUNTER — OFFICE VISIT (OUTPATIENT)
Dept: FAMILY MEDICINE | Facility: CLINIC | Age: 50
End: 2020-10-14
Payer: COMMERCIAL

## 2020-10-14 VITALS
DIASTOLIC BLOOD PRESSURE: 83 MMHG | TEMPERATURE: 98.6 F | HEIGHT: 65 IN | WEIGHT: 239 LBS | HEART RATE: 99 BPM | BODY MASS INDEX: 39.82 KG/M2 | OXYGEN SATURATION: 94 % | SYSTOLIC BLOOD PRESSURE: 164 MMHG

## 2020-10-14 DIAGNOSIS — I10 ESSENTIAL HYPERTENSION: Primary | ICD-10-CM

## 2020-10-14 DIAGNOSIS — Z23 NEEDS FLU SHOT: ICD-10-CM

## 2020-10-14 DIAGNOSIS — M79.89 NODULE OF SOFT TISSUE: ICD-10-CM

## 2020-10-14 DIAGNOSIS — R35.0 URINARY FREQUENCY: ICD-10-CM

## 2020-10-14 DIAGNOSIS — N39.41 URGE INCONTINENCE OF URINE: ICD-10-CM

## 2020-10-14 DIAGNOSIS — R31.9 HEMATURIA, UNSPECIFIED TYPE: ICD-10-CM

## 2020-10-14 PROCEDURE — 90471 IMMUNIZATION ADMIN: CPT | Performed by: NURSE PRACTITIONER

## 2020-10-14 PROCEDURE — 99214 OFFICE O/P EST MOD 30 MIN: CPT | Mod: 25 | Performed by: NURSE PRACTITIONER

## 2020-10-14 PROCEDURE — 90686 IIV4 VACC NO PRSV 0.5 ML IM: CPT | Performed by: NURSE PRACTITIONER

## 2020-10-14 RX ORDER — LISINOPRIL AND HYDROCHLOROTHIAZIDE 12.5; 2 MG/1; MG/1
1 TABLET ORAL DAILY
Qty: 30 TABLET | Refills: 0 | Status: SHIPPED | OUTPATIENT
Start: 2020-10-14 | End: 2020-10-28

## 2020-10-14 ASSESSMENT — ENCOUNTER SYMPTOMS
TINGLING: 0
WHEEZING: 0
HEMATURIA: 1
DYSURIA: 0
BRUISES/BLEEDS EASILY: 0
WEIGHT GAIN: 1
MYALGIAS: 0
CHILLS: 0
PALPITATIONS: 0
NAUSEA: 0
JOINT SWELLING: 0
ABDOMINAL PAIN: 0
DIZZINESS: 0
NERVOUS/ANXIOUS: 0
LOSS OF CONSCIOUSNESS: 0
COUGH: 1
NUMBNESS: 0
VOMITING: 0
FEVER: 0
DIARRHEA: 0
LEG SWELLING: 0
FLANK PAIN: 0
SKIN CHANGES: 0
SINUS CONGESTION: 0
FATIGUE: 0
DEPRESSION: 0
CONSTIPATION: 0
BACK PAIN: 1
ALTERED TEMPERATURE REGULATION: 0
POSTURAL DYSPNEA: 1

## 2020-10-14 ASSESSMENT — ANXIETY QUESTIONNAIRES
7. FEELING AFRAID AS IF SOMETHING AWFUL MIGHT HAPPEN: NOT AT ALL
GAD7 TOTAL SCORE: 10
3. WORRYING TOO MUCH ABOUT DIFFERENT THINGS: NEARLY EVERY DAY
2. NOT BEING ABLE TO STOP OR CONTROL WORRYING: NEARLY EVERY DAY
IF YOU CHECKED OFF ANY PROBLEMS ON THIS QUESTIONNAIRE, HOW DIFFICULT HAVE THESE PROBLEMS MADE IT FOR YOU TO DO YOUR WORK, TAKE CARE OF THINGS AT HOME, OR GET ALONG WITH OTHER PEOPLE: SOMEWHAT DIFFICULT
1. FEELING NERVOUS, ANXIOUS, OR ON EDGE: NOT AT ALL
5. BEING SO RESTLESS THAT IT IS HARD TO SIT STILL: NOT AT ALL
6. BECOMING EASILY ANNOYED OR IRRITABLE: SEVERAL DAYS

## 2020-10-14 ASSESSMENT — PAIN SCALES - GENERAL: PAINLEVEL: NO PAIN (0)

## 2020-10-14 ASSESSMENT — PATIENT HEALTH QUESTIONNAIRE - PHQ9
5. POOR APPETITE OR OVEREATING: NEARLY EVERY DAY
SUM OF ALL RESPONSES TO PHQ QUESTIONS 1-9: 9

## 2020-10-14 ASSESSMENT — MIFFLIN-ST. JEOR: SCORE: 1870.98

## 2020-10-14 NOTE — PATIENT INSTRUCTIONS

## 2020-10-14 NOTE — NURSING NOTE
Chief Complaint   Patient presents with     Physical     Pt is here for a physical.     Depression Response    Patient completed the PHQ-9 assessment for depression and scored >9? Yes  Question 9 on the PHQ-9 was positive for suicidality? No  Does patient have current mental health provider? No    Is this a virtual visit? No    I personally notified the following: visit provider       IZAIAH Reynolds 12:24 PM  10/14/2020

## 2020-10-14 NOTE — PROGRESS NOTES
BEENA       Raul Oates is a 50 year old man who presents for follow up of concern(s) listed below:.  Chief Complaint   Patient presents with     Physical     Pt is here for a physical.     Patient presents for a physical examination; however the appt was changed to problem focused as he is working on several problems currently. Seen 10/12 for HTN. Previously taking metoprolol, stopped by patient several months ago d/t dizziness. Low back pain - occasional Aleve and Tizanidine - last taken a week ago. Right hip injection performed last Friday. Works for [employer]. Reports weight gain during quarantine (30 lbs) d/t more stress eating; follows no specific diet. Endorses hematuria - red bright, typically occurs during AM while at work. Last occurred a week ago. Denies flank pain, fever/chills. Endorses urgency, occasional nocturnal incontinence, dysuria. Continues to monitor BP intermittently - patient does not track BP at home; does not have a cuff. Tends to take BP at work. Does not drink EtOH or smoke. Has been trying to cut back on pop and caffeine/coffee consumption. For exercise - walks all day while working. Sexually active - no concerns for STI/STDs. PHQ9 - 9, GAD7 -10 - listens to music, watch comedies, family/friend support.     Problem, Medication and Allergy Lists were reviewed and updated if needed.    Patient is an established patient of this clinic.         Review of Systems:   Review of Systems     Constitutional:  Positive for weight gain. Negative for fever, chills and fatigue.   HENT:  Negative for hearing loss and sinus congestion.    Eyes:  Negative for decreased vision.   Respiratory:   Positive for cough and postural dyspnea. Negative for wheezing.    Cardiovascular:  Negative for chest pain, palpitations and leg swelling.   Gastrointestinal:  Negative for nausea, vomiting, abdominal pain, diarrhea and constipation.   Genitourinary:  Positive for bladder incontinence,  "urgency, hematuria and nocturia. Negative for dysuria, flank pain, scrotal pain, penile discharge and male genitourinary complaint.   Musculoskeletal:  Positive for back pain. Negative for myalgias and joint swelling.   Skin:  Negative for rash and skin changes.   Neurological:  Negative for dizziness, tingling, loss of consciousness and numbness.   Endo/Heme:  Negative for bruises/bleeds easily.   Psychiatric/Behavioral:  Negative for depression and mood swings.    Endocrine:  Negative for altered temperature regulation.              Physical Exam:     Vitals:    10/14/20 1220   BP: (!) 164/83   Pulse: 99   Temp: 98.6  F (37  C)   TempSrc: Oral   SpO2: 94%   Weight: 108.4 kg (239 lb)   Height: 1.651 m (5' 5\")     Body mass index is 39.77 kg/m .     Vitals were reviewed and were normal aside from hypertension 164/83     Physical Exam  Constitutional:       Appearance: Normal appearance.   HENT:      Head: Normocephalic and atraumatic.      Nose: Nose normal.      Mouth/Throat:      Mouth: Mucous membranes are moist.      Pharynx: Oropharynx is clear.   Eyes:      Extraocular Movements: Extraocular movements intact.      Conjunctiva/sclera: Conjunctivae normal.      Pupils: Pupils are equal, round, and reactive to light.   Neck:      Musculoskeletal: Normal range of motion and neck supple.   Cardiovascular:      Rate and Rhythm: Normal rate and regular rhythm.      Pulses: Normal pulses.      Heart sounds: Normal heart sounds.   Pulmonary:      Effort: Pulmonary effort is normal.      Breath sounds: No wheezing.   Chest:      Chest wall: No tenderness.   Abdominal:      General: Abdomen is flat. Bowel sounds are normal.      Tenderness: There is no abdominal tenderness. There is no guarding or rebound.   Musculoskeletal:      Comments: RLE limp r/t LBP. No edema.    Skin:     General: Skin is warm and dry.   Neurological:      General: No focal deficit present.      Mental Status: He is alert and oriented to person, " place, and time. Mental status is at baseline.   Psychiatric:         Mood and Affect: Mood normal.         Behavior: Behavior normal.         Thought Content: Thought content normal.         Judgment: Judgment normal.         Results:   Labs and imaging pending.     Assessment and Plan     1. Essential hypertension  Comment: BP during exam 164/83. Remains elevated when patient checks BP at work. Denies headache, vision changes, chest pain, SOB.    - lisinopril-hydrochlorothiazide (ZESTORETIC) 20-12.5 MG tablet; Take 1 tablet by mouth daily  Dispense: 30 tablet; Refill: 0    2. Hematuria, unspecified type  Comment: Intermittent hematuria - bright red blood. Occasional presence of clots.   - UA with Micro reflex to Culture; Future  - UROLOGY ADULT REFERRAL  - PSA screen; Future    3. Urinary frequency, Urge incontinence of urine  Comment: Intermittent nocturnal incontinence. Up almost every hour at night per patient report d/t frequent urination.   - UROLOGY ADULT REFERRAL  - PSA screen; Future    4. Needs flu shot    - FLU VAC PRESRV FREE QUAD SPLIT VIR 3+YRS IM    6. Nodule of soft tissue  Comment: Minimal pain, very limited movement. Approximately marble-sized.  - US Abdomen Limited; Future    Medications Discontinued During This Encounter   Medication Reason     acetaminophen 650 MG TABS Stopped by Patient     docusate sodium (COLACE) 100 MG capsule Stopped by Patient     metoprolol succinate ER (TOPROL-XL) 50 MG 24 hr tablet Stopped by Patient     lisinopril-hydrochlorothiazide (ZESTORETIC) 10-12.5 MG tablet Dose adjustment     First, increase BP medication dosage. Next, return for follow up in 2 weeks. In the meantime, have labs and imaging completed. Options for treatment and follow-up care were reviewed with the patient. Raul Oates  engaged in the decision making process and verbalized understanding of the options discussed and agreed with the final plan.  Deedee Griffiths, RN, PHN, [employer]   [place] DNP student  I was present with the NP student who participated in the service and in the documentation of the services provided. I have verified the history and personally performed the physical exam and medical decision making, as documented by the student and edited by me.  BOBBY Andrade, ANTONIETTA  Addendum 10/20/2020 1112: I called to report US findings of lipoma in abdomen, no concerns. UC showed urogenital mixed dion, no specific concerns with this, other than the fact that he has hematuria so I recommended that he see Urology as we had planned for him to do so. He will follow up in clinic with me next week. He verbalizes understanding of the plan.   BOBBY Andrade, CNP

## 2020-10-15 ASSESSMENT — ANXIETY QUESTIONNAIRES: GAD7 TOTAL SCORE: 10

## 2020-10-16 ENCOUNTER — ANCILLARY PROCEDURE (OUTPATIENT)
Dept: ULTRASOUND IMAGING | Facility: CLINIC | Age: 50
End: 2020-10-16
Attending: NURSE PRACTITIONER
Payer: COMMERCIAL

## 2020-10-16 DIAGNOSIS — M79.89 NODULE OF SOFT TISSUE: ICD-10-CM

## 2020-10-16 DIAGNOSIS — R35.0 URINARY FREQUENCY: ICD-10-CM

## 2020-10-16 DIAGNOSIS — R31.9 HEMATURIA, UNSPECIFIED TYPE: ICD-10-CM

## 2020-10-16 DIAGNOSIS — N39.41 URGE INCONTINENCE OF URINE: ICD-10-CM

## 2020-10-16 LAB
ALBUMIN UR-MCNC: 30 MG/DL
APPEARANCE UR: ABNORMAL
BACTERIA #/AREA URNS HPF: ABNORMAL /HPF
BILIRUB UR QL STRIP: NEGATIVE
COLOR UR AUTO: YELLOW
GLUCOSE UR STRIP-MCNC: NEGATIVE MG/DL
HGB UR QL STRIP: ABNORMAL
KETONES UR STRIP-MCNC: NEGATIVE MG/DL
LEUKOCYTE ESTERASE UR QL STRIP: ABNORMAL
MUCOUS THREADS #/AREA URNS LPF: PRESENT /LPF
NITRATE UR QL: NEGATIVE
PH UR STRIP: 5 PH (ref 5–7)
PSA SERPL-ACNC: 1.68 UG/L (ref 0–4)
RBC #/AREA URNS AUTO: >182 /HPF (ref 0–2)
SOURCE: ABNORMAL
SP GR UR STRIP: 1.02 (ref 1–1.03)
SQUAMOUS #/AREA URNS AUTO: 6 /HPF (ref 0–1)
UROBILINOGEN UR STRIP-MCNC: 0 MG/DL (ref 0–2)
WBC #/AREA URNS AUTO: >182 /HPF (ref 0–5)
WBC CLUMPS #/AREA URNS HPF: PRESENT /HPF

## 2020-10-16 PROCEDURE — 87086 URINE CULTURE/COLONY COUNT: CPT | Mod: 90 | Performed by: PATHOLOGY

## 2020-10-16 PROCEDURE — 81001 URINALYSIS AUTO W/SCOPE: CPT | Performed by: PATHOLOGY

## 2020-10-16 PROCEDURE — 76705 ECHO EXAM OF ABDOMEN: CPT

## 2020-10-16 PROCEDURE — 99000 SPECIMEN HANDLING OFFICE-LAB: CPT | Performed by: PATHOLOGY

## 2020-10-16 PROCEDURE — G0103 PSA SCREENING: HCPCS | Performed by: PATHOLOGY

## 2020-10-16 PROCEDURE — 36415 COLL VENOUS BLD VENIPUNCTURE: CPT | Performed by: PATHOLOGY

## 2020-10-17 LAB
BACTERIA SPEC CULT: NORMAL
Lab: NORMAL
SPECIMEN SOURCE: NORMAL

## 2020-10-19 ENCOUNTER — TELEPHONE (OUTPATIENT)
Dept: UROLOGY | Facility: CLINIC | Age: 50
End: 2020-10-19

## 2020-10-19 NOTE — TELEPHONE ENCOUNTER
[facility] Call Center    Phone Message    May a detailed message be left on voicemail: yes     Reason for Call: Other: Pt being referred for Hematuria, Urinary frequency, Urge incontinence. Records and referral in epic please review      Action Taken: Message routed to:  [facility]    Travel Screening: Not Applicable

## 2020-10-19 NOTE — TELEPHONE ENCOUNTER
Put him on with dr mitchell  On nov 11th at 1pm  Hold spot  Call patient please India Lynch LPN Staff Nurse

## 2020-10-20 NOTE — TELEPHONE ENCOUNTER
Left a detailed VM that patient is scheduled for a VV with Dr Meyer on 11/11 at 1pm. Any questions to call the clinic

## 2020-10-20 NOTE — TELEPHONE ENCOUNTER
MEDICAL RECORDS REQUEST   [facility]  Urology Clinic  [address] [place] [address]  PHONE: [phone]  Fax: [phone]        FUTURE VISIT INFORMATION                                                   Raul Oates, : 1970 scheduled for future visit at [facility]    APPOINTMENT INFORMATION:    Date: 20 1PM    Provider:  Ciaran Meyer MD    Reason for Visit/Diagnosis: Hematuria     REFERRAL INFORMATION:    Referring provider:  KEYSHA MEDLEY    Specialty: MD    Referring providers clinic:      Clinic contact number:  N/A    RECORDS REQUESTED FOR VISIT                                                     NOTES  STATUS/DETAILS   OFFICE NOTE from referring provider  yes   OFFICE NOTE from other specialist  no   DISCHARGE SUMMARY from hospital  no   DISCHARGE REPORT from the ER  no   OPERATIVE REPORT  no   MEDICATION LIST  no   LABS     URINALYSIS (UA)  yes     PRE-VISIT CHECKLIST      Record collection complete Yes- Internal recs in epic    Appointment appropriately scheduled           (right time/right provider) Yes   MyChart activation Yes   Questionnaire complete If no, please explain: In process      Completed by: Deisy Garg

## 2020-10-27 ENCOUNTER — TELEPHONE (OUTPATIENT)
Dept: ORTHOPEDICS | Facility: CLINIC | Age: 50
End: 2020-10-27

## 2020-10-27 ENCOUNTER — VIRTUAL VISIT (OUTPATIENT)
Dept: ORTHOPEDICS | Facility: CLINIC | Age: 50
End: 2020-10-27
Payer: COMMERCIAL

## 2020-10-27 DIAGNOSIS — M54.16 LUMBAR RADICULAR PAIN: Primary | ICD-10-CM

## 2020-10-27 PROCEDURE — 99213 OFFICE O/P EST LOW 20 MIN: CPT | Mod: TEL | Performed by: PREVENTIVE MEDICINE

## 2020-10-27 NOTE — LETTER
"10/27/2020       RE: Raul Oates  [address]  [facility] [address]     Dear Colleague,    Thank you for referring your patient, Raul Oates, to the [facility] [place] at [facility]. Please see a copy of my visit note below.    Raul Oates is a 50 year old male who is being evaluated via a billable telephone visit.      The patient has been notified of following:     \"This telephone visit will be conducted via a call between you and your physician/provider. We have found that certain health care needs can be provided without the need for a physical exam.  This service lets us provide the care you need with a short phone conversation.  If a prescription is necessary we can send it directly to your pharmacy.  If lab work is needed we can place an order for that and you can then stop by our lab to have the test done at a later time.    Telephone visits are billed at different rates depending on your insurance coverage. During this emergency period, for some insurers they may be billed the same as an in-person visit.  Please reach out to your insurance provider with any questions.    If during the course of the call the physician/provider feels a telephone visit is not appropriate, you will not be charged for this service.\"    Patient has given verbal consent for Telephone visit?  Yes    What phone number would you like to be contacted at? [phone]    How would you like to obtain your AVS? MyChart  HISTORY OF PRESENT ILLNESS  Mr. Oates is a pleasant 50 year old year old male who presents for KOURTNEY  Feels much better   Has a lot of improvement of pain since injection  Moving better  No concerns today  Less pain in hip and low back and leg    MEDICAL HISTORY  Patient Active Problem List   Diagnosis     Hyperlipidemia LDL goal <160     History of total hip arthroplasty, left     Essential hypertension, benign     Hypertension "       Current Outpatient Medications   Medication Sig Dispense Refill     diclofenac (VOLTAREN) 75 MG EC tablet Take 1 tablet (75 mg) by mouth 2 times daily 60 tablet 0     gabapentin (NEURONTIN) 100 MG capsule Take 1 capsule (100 mg) by mouth 4 times daily 120 capsule 1     lisinopril-hydrochlorothiazide (ZESTORETIC) 20-12.5 MG tablet Take 1 tablet by mouth daily 30 tablet 0     methylPREDNISolone (MEDROL DOSEPAK) 4 MG tablet therapy pack Follow Package Directions 21 tablet 0     methylPREDNISolone (MEDROL DOSEPAK) 4 MG tablet therapy pack Follow Package Directions 21 tablet 0     ondansetron (ZOFRAN-ODT) 4 MG disintegrating tablet Take 1 tablet (4 mg) by mouth every 6 hours as needed for nausea 10 tablet 0     tiZANidine (ZANAFLEX) 4 MG tablet Take 1-2 tablets (4-8 mg) by mouth nightly as needed 30 tablet 1       Allergies   Allergen Reactions     No Known Allergies        Family History   Problem Relation Age of Onset     Unknown/Adopted Mother      Coronary Artery Disease Maternal Grandfather      Unknown/Adopted Father      Social History     Socioeconomic History     Marital status: Single     Spouse name: None     Number of children: None     Years of education: None     Highest education level: None   Occupational History     None   Social Needs     Financial resource strain: None     Food insecurity     Worry: None     Inability: None     Transportation needs     Medical: None     Non-medical: None   Tobacco Use     Smoking status: Never Smoker     Smokeless tobacco: Never Used   Substance and Sexual Activity     Alcohol use: No     Alcohol/week: 0.0 standard drinks     Drug use: No     Sexual activity: Not Currently   Lifestyle     Physical activity     Days per week: None     Minutes per session: None     Stress: None   Relationships     Social connections     Talks on phone: None     Gets together: None     Attends Lutheran service: None     Active member of club or organization: None     Attends  meetings of clubs or organizations: None     Relationship status: None     Intimate partner violence     Fear of current or ex partner: None     Emotionally abused: None     Physically abused: None     Forced sexual activity: None   Other Topics Concern     None   Social History Narrative     None       Additional medical/Social/Surgical histories reviewed in EPIC and updated as appropriate.     REVIEW OF SYSTEMS (10/27/2020)  10 point ROS of systems including Constitutional, Eyes, Respiratory, Cardiovascular, Gastroenterology, Genitourinary, Integumentary, Musculoskeletal, Psychiatric, Allergic/Immunologic were all negative except for pertinent positives noted in my HPI.     ASSESSMENT & PLAN  51 yo male with lumbar disc herniation, radicular pain, improved overall  Reviewed response to KOURTNEY  Doing a lot better  Cont. HEP  Consider PT  F/u 6 weeks  Ceasar Orantes MD, CAQSM    Phone call duration: 13 minutes  Phone call start: 1:40pm  Phone call end: 1:53pm    Ceasar Orantes MD          Again, thank you for allowing me to participate in the care of your patient.      Sincerely,    Ceasar Orantes MD

## 2020-10-27 NOTE — NURSING NOTE
Reason For Visit:   Chief Complaint   Patient presents with     RECHECK     F/u KOURTNEY        There were no vitals taken for this visit.    Pain Assessment  Patient Currently in Pain: Yes  0-10 Pain Scale: 3  Primary Pain Location: Back    Susie Lee ATC

## 2020-10-27 NOTE — LETTER
"  10/27/2020      RE: Raul Oates  [address]  [facility] [address]       Raul Oates is a 50 year old male who is being evaluated via a billable telephone visit.      The patient has been notified of following:     \"This telephone visit will be conducted via a call between you and your physician/provider. We have found that certain health care needs can be provided without the need for a physical exam.  This service lets us provide the care you need with a short phone conversation.  If a prescription is necessary we can send it directly to your pharmacy.  If lab work is needed we can place an order for that and you can then stop by our lab to have the test done at a later time.    Telephone visits are billed at different rates depending on your insurance coverage. During this emergency period, for some insurers they may be billed the same as an in-person visit.  Please reach out to your insurance provider with any questions.    If during the course of the call the physician/provider feels a telephone visit is not appropriate, you will not be charged for this service.\"    Patient has given verbal consent for Telephone visit?  Yes    What phone number would you like to be contacted at? [phone]    How would you like to obtain your AVS? MyChart  HISTORY OF PRESENT ILLNESS  Mr. Oates is a pleasant 50 year old year old male who presents for KOURTNEY  Feels much better   Has a lot of improvement of pain since injection  Moving better  No concerns today  Less pain in hip and low back and leg    MEDICAL HISTORY  Patient Active Problem List   Diagnosis     Hyperlipidemia LDL goal <160     History of total hip arthroplasty, left     Essential hypertension, benign     Hypertension       Current Outpatient Medications   Medication Sig Dispense Refill     diclofenac (VOLTAREN) 75 MG EC tablet Take 1 tablet (75 mg) by mouth 2 times daily 60 tablet 0     gabapentin (NEURONTIN) 100 MG capsule Take 1 capsule (100 " mg) by mouth 4 times daily 120 capsule 1     lisinopril-hydrochlorothiazide (ZESTORETIC) 20-12.5 MG tablet Take 1 tablet by mouth daily 30 tablet 0     methylPREDNISolone (MEDROL DOSEPAK) 4 MG tablet therapy pack Follow Package Directions 21 tablet 0     methylPREDNISolone (MEDROL DOSEPAK) 4 MG tablet therapy pack Follow Package Directions 21 tablet 0     ondansetron (ZOFRAN-ODT) 4 MG disintegrating tablet Take 1 tablet (4 mg) by mouth every 6 hours as needed for nausea 10 tablet 0     tiZANidine (ZANAFLEX) 4 MG tablet Take 1-2 tablets (4-8 mg) by mouth nightly as needed 30 tablet 1       Allergies   Allergen Reactions     No Known Allergies        Family History   Problem Relation Age of Onset     Unknown/Adopted Mother      Coronary Artery Disease Maternal Grandfather      Unknown/Adopted Father      Social History     Socioeconomic History     Marital status: Single     Spouse name: None     Number of children: None     Years of education: None     Highest education level: None   Occupational History     None   Social Needs     Financial resource strain: None     Food insecurity     Worry: None     Inability: None     Transportation needs     Medical: None     Non-medical: None   Tobacco Use     Smoking status: Never Smoker     Smokeless tobacco: Never Used   Substance and Sexual Activity     Alcohol use: No     Alcohol/week: 0.0 standard drinks     Drug use: No     Sexual activity: Not Currently   Lifestyle     Physical activity     Days per week: None     Minutes per session: None     Stress: None   Relationships     Social connections     Talks on phone: None     Gets together: None     Attends Hindu service: None     Active member of club or organization: None     Attends meetings of clubs or organizations: None     Relationship status: None     Intimate partner violence     Fear of current or ex partner: None     Emotionally abused: None     Physically abused: None     Forced sexual activity: None    Other Topics Concern     None   Social History Narrative     None       Additional medical/Social/Surgical histories reviewed in EPIC and updated as appropriate.     REVIEW OF SYSTEMS (10/27/2020)  10 point ROS of systems including Constitutional, Eyes, Respiratory, Cardiovascular, Gastroenterology, Genitourinary, Integumentary, Musculoskeletal, Psychiatric, Allergic/Immunologic were all negative except for pertinent positives noted in my HPI.     ASSESSMENT & PLAN  51 yo male with lumbar disc herniation, radicular pain, improved overall  Reviewed response to KOURTNEY  Doing a lot better  Cont. HEP  Consider PT  F/u 6 weeks  Ceasar Orantes MD, CAQSM    Phone call duration: 13 minutes  Phone call start: 1:40pm  Phone call end: 1:53pm    Ceasar Orantes MD

## 2020-10-27 NOTE — PROGRESS NOTES
"Raul Oates is a 50 year old male who is being evaluated via a billable telephone visit.      The patient has been notified of following:     \"This telephone visit will be conducted via a call between you and your physician/provider. We have found that certain health care needs can be provided without the need for a physical exam.  This service lets us provide the care you need with a short phone conversation.  If a prescription is necessary we can send it directly to your pharmacy.  If lab work is needed we can place an order for that and you can then stop by our lab to have the test done at a later time.    Telephone visits are billed at different rates depending on your insurance coverage. During this emergency period, for some insurers they may be billed the same as an in-person visit.  Please reach out to your insurance provider with any questions.    If during the course of the call the physician/provider feels a telephone visit is not appropriate, you will not be charged for this service.\"    Patient has given verbal consent for Telephone visit?  Yes    What phone number would you like to be contacted at? [phone]    How would you like to obtain your AVS? MyChart  HISTORY OF PRESENT ILLNESS  Mr. Oates is a pleasant 50 year old year old male who presents for KOURTNEY  Feels much better   Has a lot of improvement of pain since injection  Moving better  No concerns today  Less pain in hip and low back and leg    MEDICAL HISTORY  Patient Active Problem List   Diagnosis     Hyperlipidemia LDL goal <160     History of total hip arthroplasty, left     Essential hypertension, benign     Hypertension       Current Outpatient Medications   Medication Sig Dispense Refill     diclofenac (VOLTAREN) 75 MG EC tablet Take 1 tablet (75 mg) by mouth 2 times daily 60 tablet 0     gabapentin (NEURONTIN) 100 MG capsule Take 1 capsule (100 mg) by mouth 4 times daily 120 capsule 1     lisinopril-hydrochlorothiazide (ZESTORETIC) " 20-12.5 MG tablet Take 1 tablet by mouth daily 30 tablet 0     methylPREDNISolone (MEDROL DOSEPAK) 4 MG tablet therapy pack Follow Package Directions 21 tablet 0     methylPREDNISolone (MEDROL DOSEPAK) 4 MG tablet therapy pack Follow Package Directions 21 tablet 0     ondansetron (ZOFRAN-ODT) 4 MG disintegrating tablet Take 1 tablet (4 mg) by mouth every 6 hours as needed for nausea 10 tablet 0     tiZANidine (ZANAFLEX) 4 MG tablet Take 1-2 tablets (4-8 mg) by mouth nightly as needed 30 tablet 1       Allergies   Allergen Reactions     No Known Allergies        Family History   Problem Relation Age of Onset     Unknown/Adopted Mother      Coronary Artery Disease Maternal Grandfather      Unknown/Adopted Father      Social History     Socioeconomic History     Marital status: Single     Spouse name: None     Number of children: None     Years of education: None     Highest education level: None   Occupational History     None   Social Needs     Financial resource strain: None     Food insecurity     Worry: None     Inability: None     Transportation needs     Medical: None     Non-medical: None   Tobacco Use     Smoking status: Never Smoker     Smokeless tobacco: Never Used   Substance and Sexual Activity     Alcohol use: No     Alcohol/week: 0.0 standard drinks     Drug use: No     Sexual activity: Not Currently   Lifestyle     Physical activity     Days per week: None     Minutes per session: None     Stress: None   Relationships     Social connections     Talks on phone: None     Gets together: None     Attends Scientology service: None     Active member of club or organization: None     Attends meetings of clubs or organizations: None     Relationship status: None     Intimate partner violence     Fear of current or ex partner: None     Emotionally abused: None     Physically abused: None     Forced sexual activity: None   Other Topics Concern     None   Social History Narrative     None       Additional  medical/Social/Surgical histories reviewed in [facility] and updated as appropriate.     REVIEW OF SYSTEMS (10/27/2020)  10 point ROS of systems including Constitutional, Eyes, Respiratory, Cardiovascular, Gastroenterology, Genitourinary, Integumentary, Musculoskeletal, Psychiatric, Allergic/Immunologic were all negative except for pertinent positives noted in my HPI.     ASSESSMENT & PLAN  49 yo male with lumbar disc herniation, radicular pain, improved overall  Reviewed response to KOURTNEY  Doing a lot better  Cont. HEP  Consider PT  F/u 6 weeks  Ceasar Orantes MD, CAQSM    Phone call duration: 13 minutes  Phone call start: 1:40pm  Phone call end: 1:53pm    Ceasar Orantes MD

## 2020-10-28 ENCOUNTER — OFFICE VISIT (OUTPATIENT)
Dept: FAMILY MEDICINE | Facility: CLINIC | Age: 50
End: 2020-10-28
Payer: COMMERCIAL

## 2020-10-28 VITALS
SYSTOLIC BLOOD PRESSURE: 123 MMHG | DIASTOLIC BLOOD PRESSURE: 81 MMHG | OXYGEN SATURATION: 97 % | TEMPERATURE: 98.2 F | HEART RATE: 108 BPM

## 2020-10-28 DIAGNOSIS — R31.9 HEMATURIA, UNSPECIFIED TYPE: ICD-10-CM

## 2020-10-28 DIAGNOSIS — I10 ESSENTIAL HYPERTENSION: Primary | ICD-10-CM

## 2020-10-28 LAB
ALBUMIN UR-MCNC: 100 MG/DL
APPEARANCE UR: ABNORMAL
BACTERIA #/AREA URNS HPF: ABNORMAL /HPF
BILIRUB UR QL STRIP: NEGATIVE
COLOR UR AUTO: YELLOW
GLUCOSE UR STRIP-MCNC: NEGATIVE MG/DL
HGB UR QL STRIP: ABNORMAL
HYALINE CASTS #/AREA URNS LPF: 46 /LPF (ref 0–2)
KETONES UR STRIP-MCNC: NEGATIVE MG/DL
LEUKOCYTE ESTERASE UR QL STRIP: ABNORMAL
MUCOUS THREADS #/AREA URNS LPF: PRESENT /LPF
NITRATE UR QL: NEGATIVE
PH UR STRIP: 5 PH (ref 5–7)
RBC #/AREA URNS AUTO: >182 /HPF (ref 0–2)
SOURCE: ABNORMAL
SP GR UR STRIP: 1.02 (ref 1–1.03)
SQUAMOUS #/AREA URNS AUTO: 6 /HPF (ref 0–1)
UROBILINOGEN UR STRIP-MCNC: 0 MG/DL (ref 0–2)
WBC #/AREA URNS AUTO: >182 /HPF (ref 0–5)
WBC CLUMPS #/AREA URNS HPF: PRESENT /HPF

## 2020-10-28 PROCEDURE — 81001 URINALYSIS AUTO W/SCOPE: CPT | Performed by: PATHOLOGY

## 2020-10-28 PROCEDURE — 99000 SPECIMEN HANDLING OFFICE-LAB: CPT | Performed by: PATHOLOGY

## 2020-10-28 PROCEDURE — 99213 OFFICE O/P EST LOW 20 MIN: CPT | Performed by: NURSE PRACTITIONER

## 2020-10-28 PROCEDURE — 87086 URINE CULTURE/COLONY COUNT: CPT | Mod: 90 | Performed by: PATHOLOGY

## 2020-10-28 RX ORDER — LISINOPRIL AND HYDROCHLOROTHIAZIDE 12.5; 2 MG/1; MG/1
1 TABLET ORAL DAILY
Qty: 90 TABLET | Refills: 3 | Status: SHIPPED | OUTPATIENT
Start: 2020-10-28 | End: 2021-01-27 | Stop reason: ALTCHOICE

## 2020-10-28 ASSESSMENT — PAIN SCALES - GENERAL: PAINLEVEL: NO PAIN (0)

## 2020-10-28 ASSESSMENT — ENCOUNTER SYMPTOMS
FEVER: 0
FATIGUE: 0
CHILLS: 0

## 2020-10-28 NOTE — PROGRESS NOTES
HPI       Raul Oates is a 50 year old man who presents for follow up on his blood pressure and hematuria.     Chief Complaint   Patient presents with     Recheck Medication     Follow up.     Raul is doing fair. He continues to have some low back pain, however this is better than it was. He denies any further hematuria. He thinks he is feeling better overall due to his blood pressure management.       Problem, Medication and Allergy Lists were reviewed and updated if needed.    Patient is an established patient of this clinic.           Review of Systems:   Review of Systems     Constitutional:  Negative for fever, chills and fatigue.               Physical Exam:   /81   Pulse 108   Temp 98.2  F (36.8  C) (Oral)   SpO2 97%   232.9 standing scale  Vitals were reviewed and were normal     Physical Exam  Vitals signs reviewed.   Constitutional:       Appearance: Normal appearance.   HENT:      Head: Normocephalic.   Musculoskeletal: Normal range of motion.   Skin:     General: Skin is warm and dry.   Neurological:      General: No focal deficit present.      Mental Status: He is alert and oriented to person, place, and time.   Psychiatric:         Mood and Affect: Mood normal.         Behavior: Behavior normal.           Results:   No diagnostics ordered today.     Assessment and Plan     1. Essential hypertension    - lisinopril-hydrochlorothiazide (ZESTORETIC) 20-12.5 MG tablet; Take 1 tablet by mouth daily  Dispense: 90 tablet; Refill: 3    2. Hematuria, unspecified type    - UA with Micro reflex to Culture; Future      There are no discontinued medications.    Raul is down 7 lbs, his BP is normal at this time. Continue same BP medications and return for follow up on status in 3 months. See Urology as planned. Options for treatment and follow-up care were reviewed with the patient. Raul Oates  engaged in the decision making process and verbalized understanding of the options discussed  and agreed with the final plan.  BOBBY Andrade, CNP

## 2020-10-28 NOTE — PATIENT INSTRUCTIONS

## 2020-10-29 LAB
BACTERIA SPEC CULT: NORMAL
Lab: NORMAL
SPECIMEN SOURCE: NORMAL

## 2020-11-09 ENCOUNTER — PRE VISIT (OUTPATIENT)
Dept: UROLOGY | Facility: CLINIC | Age: 50
End: 2020-11-09

## 2020-11-09 NOTE — TELEPHONE ENCOUNTER
Reason for visit: hematuria consult     Relevant information: Referred by Xochitl Nolan NP    Records/imaging/labs/orders: all records available    Pt called: no need for a call     5

## 2020-11-11 ENCOUNTER — PRE VISIT (OUTPATIENT)
Dept: UROLOGY | Facility: CLINIC | Age: 50
End: 2020-11-11

## 2020-11-11 ENCOUNTER — VIRTUAL VISIT (OUTPATIENT)
Dept: UROLOGY | Facility: CLINIC | Age: 50
End: 2020-11-11
Payer: COMMERCIAL

## 2020-11-11 DIAGNOSIS — R31.0 GROSS HEMATURIA: Primary | ICD-10-CM

## 2020-11-11 PROCEDURE — 99204 OFFICE O/P NEW MOD 45 MIN: CPT | Mod: 95 | Performed by: UROLOGY

## 2020-11-11 NOTE — PROGRESS NOTES
"Raul Oates is a 50 year old male who is being evaluated via a billable video visit.      Video Visit Technology for this patient: Michael Video Visit- Patient was left in waiting room      The patient has been notified of following:     \"This video visit will be conducted via a call between you and your physician/provider. We have found that certain health care needs can be provided without the need for an in-person physical exam.  This service lets us provide the care you need with a video conversation.  If a prescription is necessary we can send it directly to your pharmacy.  If lab work is needed we can place an order for that and you can then stop by our lab to have the test done at a later time.    Video visits are billed at different rates depending on your insurance coverage.  Please reach out to your insurance provider with any questions.    If during the course of the call the physician/provider feels a video visit is not appropriate, you will not be charged for this service.\"    Patient has given verbal consent for Video visit? Yes  How would you like to obtain your AVS? MyChart  If you are dropped from the video visit, the video invite should be resent to: Text to cell phone: [phone]   Will anyone else be joining your video visit? No        Video-Visit Details    Type of service:  Video Visit    Video Start Time: 1:15p  Video End Time: 1:45p    Originating Location (pt. Location): Home    Distant Location (provider location):  [facility] [place]     Platform used for Video Visit: Michael Meyer MD        Name: Raul Oates    MRN: [id number]   YOB: 1970                 Chief Complaint:   Gross hematuria          History of Present Illness:   Mr. Raul Oates is a 50 year old male seen in consultation today from Xochitl Nolan  He has some LUTS.  He has some HTN.  He has intermittent gross hematuria.  PSA 1.68  UA showed >182 RBC/hpf  He has " "a good urinary stream.  Significant nocturia.  No incontinence.  He had a CT noncontrast which showed a nearly 1cm nonobstructed left kidney stone.  Nonsmoker         Past Medical History:     Past Medical History:   Diagnosis Date     Epistaxis      Groin mass      Hip pain      Hypertension             Past Surgical History:     Past Surgical History:   Procedure Laterality Date     ARTHROPLASTY HIP Left 10/12/2015    Procedure: ARTHROPLASTY HIP;  Surgeon: Hira Reilly MD;  Location: [facility] OR     ORTHOPEDIC SURGERY      right leg \"bone repair\"            Social History:     Social History     Tobacco Use     Smoking status: Never Smoker     Smokeless tobacco: Never Used   Substance Use Topics     Alcohol use: No     Alcohol/week: 0.0 standard drinks            Family History:     Family History   Problem Relation Age of Onset     Unknown/Adopted Mother      Coronary Artery Disease Maternal Grandfather      Unknown/Adopted Father               Allergies:     Allergies   Allergen Reactions     No Known Allergies             Medications:     Current Outpatient Medications   Medication Sig     diclofenac (VOLTAREN) 75 MG EC tablet Take 1 tablet (75 mg) by mouth 2 times daily     lisinopril-hydrochlorothiazide (ZESTORETIC) 20-12.5 MG tablet Take 1 tablet by mouth daily     gabapentin (NEURONTIN) 100 MG capsule Take 1 capsule (100 mg) by mouth 4 times daily     Current Facility-Administered Medications   Medication     lidocaine (PF) (XYLOCAINE) 1 % injection 4 mL     triamcinolone (KENALOG-40) injection 40 mg             Review of Systems:    ROS: 14 point ROS neg other than the symptoms noted above in the HPI and PMH.          Physical Exam:   Exam:           Constitutional - No apparent distress. Patient of stated age.               Eyes - no redness or discharge.              Respiratory - no cough. no labored breathing              Musculoskeletal - full range of motion in all extremities              Skin - no " visible skin discoloration or lesions              Neurological - no tremors              Psychiatric - no anxiety, alert & oriented                 The rest of a comprehensive physical examination is deferred due to PHE (public health emergency) video visit restrictions.         Labs:    All laboratory data reviewed with patient  Last Comprehensive Metabolic Panel:  Sodium   Date Value Ref Range Status   10/12/2020 135 133 - 144 mmol/L Final     Potassium   Date Value Ref Range Status   10/12/2020 4.6 3.4 - 5.3 mmol/L Final     Chloride   Date Value Ref Range Status   10/12/2020 102 94 - 109 mmol/L Final     Carbon Dioxide   Date Value Ref Range Status   10/12/2020 28 20 - 32 mmol/L Final     Anion Gap   Date Value Ref Range Status   10/12/2020 5 3 - 14 mmol/L Final     Glucose   Date Value Ref Range Status   10/12/2020 118 (H) 70 - 99 mg/dL Final     Urea Nitrogen   Date Value Ref Range Status   10/12/2020 12 7 - 30 mg/dL Final     Creatinine   Date Value Ref Range Status   10/12/2020 0.78 0.66 - 1.25 mg/dL Final     GFR Estimate   Date Value Ref Range Status   10/12/2020 >90 >60 mL/min/[1.73_m2] Final     Comment:     Non  GFR Calc  Starting 12/18/2018, serum creatinine based estimated GFR (eGFR) will be   calculated using the Chronic Kidney Disease Epidemiology Collaboration   (CKD-EPI) equation.       Calcium   Date Value Ref Range Status   10/12/2020 9.3 8.5 - 10.1 mg/dL Final           Imaging:    All imaging reviewed with patient.  Significant for CT noncontrast with large left nonobstructing stone. And perhaps an abnormal urachus? Hard to tell      Outside records:    I spent 10 minutes reviewing outside records.           Assessment and Plan:   50 year old male with gross hematuria and LUTS  Nonobstructive stones.  Questionable abnormal urachus on noncontrast.  Recommend ct urogram and cystoscopy in clinic.  Discussed benign and worrisome causes of gross hematuria.  Discussed we might start  flomax or anticholinergic after ruling out worrisome diagnoses.    Ciaran Meyer MD  November 11, 2020

## 2020-11-11 NOTE — LETTER
"11/11/2020       RE: Raul Oates  [address]  [facility] [address]     Dear Colleague,    Thank you for referring your patient, Raul Oates, to the [facility] [place] at [facility]. Please see a copy of my visit note below.    Raul Oates is a 50 year old male who is being evaluated via a billable video visit.      Video Visit Technology for this patient: Michael Video Visit- Patient was left in waiting room      The patient has been notified of following:     \"This video visit will be conducted via a call between you and your physician/provider. We have found that certain health care needs can be provided without the need for an in-person physical exam.  This service lets us provide the care you need with a video conversation.  If a prescription is necessary we can send it directly to your pharmacy.  If lab work is needed we can place an order for that and you can then stop by our lab to have the test done at a later time.    Video visits are billed at different rates depending on your insurance coverage.  Please reach out to your insurance provider with any questions.    If during the course of the call the physician/provider feels a video visit is not appropriate, you will not be charged for this service.\"    Patient has given verbal consent for Video visit? Yes  How would you like to obtain your AVS? MyChart  If you are dropped from the video visit, the video invite should be resent to: Text to cell phone: [phone]   Will anyone else be joining your video visit? No        Video-Visit Details    Type of service:  Video Visit    Video Start Time: 1:15p  Video End Time: 1:45p    Originating Location (pt. Location): Home    Distant Location (provider location):  [facility]     Platform used for Video Visit: Michael Meyer MD        Name: Raul Oates    MRN: [id number]   Date of " "Birth: 1970                 Chief Complaint:   Gross hematuria          History of Present Illness:   Mr. Raul Oates is a 50 year old male seen in consultation today from Xochitl Nolan  He has some LUTS.  He has some HTN.  He has intermittent gross hematuria.  PSA 1.68  UA showed >182 RBC/hpf  He has a good urinary stream.  Significant nocturia.  No incontinence.  He had a CT noncontrast which showed a nearly 1cm nonobstructed left kidney stone.  Nonsmoker         Past Medical History:     Past Medical History:   Diagnosis Date     Epistaxis      Groin mass      Hip pain      Hypertension             Past Surgical History:     Past Surgical History:   Procedure Laterality Date     ARTHROPLASTY HIP Left 10/12/2015    Procedure: ARTHROPLASTY HIP;  Surgeon: Hira Reilly MD;  Location: [facility]     ORTHOPEDIC SURGERY      right leg \"bone repair\"            Social History:     Social History     Tobacco Use     Smoking status: Never Smoker     Smokeless tobacco: Never Used   Substance Use Topics     Alcohol use: No     Alcohol/week: 0.0 standard drinks            Family History:     Family History   Problem Relation Age of Onset     Unknown/Adopted Mother      Coronary Artery Disease Maternal Grandfather      Unknown/Adopted Father               Allergies:     Allergies   Allergen Reactions     No Known Allergies             Medications:     Current Outpatient Medications   Medication Sig     diclofenac (VOLTAREN) 75 MG EC tablet Take 1 tablet (75 mg) by mouth 2 times daily     lisinopril-hydrochlorothiazide (ZESTORETIC) 20-12.5 MG tablet Take 1 tablet by mouth daily     gabapentin (NEURONTIN) 100 MG capsule Take 1 capsule (100 mg) by mouth 4 times daily     Current Facility-Administered Medications   Medication     lidocaine (PF) (XYLOCAINE) 1 % injection 4 mL     triamcinolone (KENALOG-40) injection 40 mg             Review of Systems:    ROS: 14 point ROS neg other than the symptoms noted above in the HPI " and PMH.          Physical Exam:   Exam:           Constitutional - No apparent distress. Patient of stated age.               Eyes - no redness or discharge.              Respiratory - no cough. no labored breathing              Musculoskeletal - full range of motion in all extremities              Skin - no visible skin discoloration or lesions              Neurological - no tremors              Psychiatric - no anxiety, alert & oriented                 The rest of a comprehensive physical examination is deferred due to PHE (public health emergency) video visit restrictions.         Labs:    All laboratory data reviewed with patient  Last Comprehensive Metabolic Panel:  Sodium   Date Value Ref Range Status   10/12/2020 135 133 - 144 mmol/L Final     Potassium   Date Value Ref Range Status   10/12/2020 4.6 3.4 - 5.3 mmol/L Final     Chloride   Date Value Ref Range Status   10/12/2020 102 94 - 109 mmol/L Final     Carbon Dioxide   Date Value Ref Range Status   10/12/2020 28 20 - 32 mmol/L Final     Anion Gap   Date Value Ref Range Status   10/12/2020 5 3 - 14 mmol/L Final     Glucose   Date Value Ref Range Status   10/12/2020 118 (H) 70 - 99 mg/dL Final     Urea Nitrogen   Date Value Ref Range Status   10/12/2020 12 7 - 30 mg/dL Final     Creatinine   Date Value Ref Range Status   10/12/2020 0.78 0.66 - 1.25 mg/dL Final     GFR Estimate   Date Value Ref Range Status   10/12/2020 >90 >60 mL/min/[1.73_m2] Final     Comment:     Non  GFR Calc  Starting 12/18/2018, serum creatinine based estimated GFR (eGFR) will be   calculated using the Chronic Kidney Disease Epidemiology Collaboration   (CKD-EPI) equation.       Calcium   Date Value Ref Range Status   10/12/2020 9.3 8.5 - 10.1 mg/dL Final           Imaging:    All imaging reviewed with patient.  Significant for CT noncontrast with large left nonobstructing stone. And perhaps an abnormal urachus? Hard to tell      Outside records:    I spent 10 minutes  reviewing outside records.           Assessment and Plan:   50 year old male with gross hematuria and LUTS  Nonobstructive stones.  Questionable abnormal urachus on noncontrast.  Recommend ct urogram and cystoscopy in clinic.  Discussed benign and worrisome causes of gross hematuria.  Discussed we might start flomax or anticholinergic after ruling out worrisome diagnoses.    Ciaran Meyer MD  November 11, 2020

## 2020-11-11 NOTE — PATIENT INSTRUCTIONS
Please follow up with imaging and the next available cystoscopy with Dr. Meyer.     It was a pleasure meeting with you today.  Thank you for allowing me and my team the privilege of caring for you today.  YOU are the reason we are here, and I truly hope we provided you with the excellent service you deserve.  Please let us know if there is anything else we can do for you so that we can be sure you are leaving completely satisfied with your care experience.           Tara Vigil

## 2020-11-19 ENCOUNTER — TELEPHONE (OUTPATIENT)
Dept: UROLOGY | Facility: CLINIC | Age: 50
End: 2020-11-19

## 2020-11-19 NOTE — TELEPHONE ENCOUNTER
Left message to schedule cysto with Dr Meyer, 1st available. Need CT Urogram few days prior to cysto. Left imaging and clinic number.

## 2020-11-29 ENCOUNTER — HEALTH MAINTENANCE LETTER (OUTPATIENT)
Age: 50
End: 2020-11-29

## 2021-01-27 ENCOUNTER — OFFICE VISIT (OUTPATIENT)
Dept: FAMILY MEDICINE | Facility: CLINIC | Age: 51
End: 2021-01-27
Payer: COMMERCIAL

## 2021-01-27 VITALS
BODY MASS INDEX: 39.56 KG/M2 | DIASTOLIC BLOOD PRESSURE: 90 MMHG | HEART RATE: 87 BPM | OXYGEN SATURATION: 94 % | WEIGHT: 237.7 LBS | SYSTOLIC BLOOD PRESSURE: 146 MMHG | TEMPERATURE: 98.3 F

## 2021-01-27 DIAGNOSIS — I10 ESSENTIAL HYPERTENSION: Primary | ICD-10-CM

## 2021-01-27 DIAGNOSIS — R31.9 HEMATURIA, UNSPECIFIED TYPE: ICD-10-CM

## 2021-01-27 DIAGNOSIS — E66.01 MORBID OBESITY (H): ICD-10-CM

## 2021-01-27 PROCEDURE — 99214 OFFICE O/P EST MOD 30 MIN: CPT | Performed by: NURSE PRACTITIONER

## 2021-01-27 RX ORDER — LOSARTAN POTASSIUM 25 MG/1
25 TABLET ORAL 2 TIMES DAILY
Qty: 60 TABLET | Refills: 0 | Status: SHIPPED | OUTPATIENT
Start: 2021-01-27 | End: 2021-04-19

## 2021-01-27 ASSESSMENT — PAIN SCALES - GENERAL: PAINLEVEL: NO PAIN (0)

## 2021-01-27 NOTE — PATIENT INSTRUCTIONS
First, stop your current blood pressure pill. Next, start your new bp pill and take this twice daily for the next 2 weeks. Next, call [phone] and schedule your Urological tests including CT Urogram. Next, follow up in 2 weeks or when its convenient for you based on when your test gets scheduled. Thank you. Xochitl  [facility] Medication Refill Request Information:  * Please contact your pharmacy regarding ANY request for medication refills.  ** NP Clinic Prescription Fax = [phone]  * Please allow 3 business days for routine medication refills.  * Please allow 5 business days for controlled substance medication refills.     [facility] Test Result notification information:  *You will be notified with in 7-10 days of your appointment day regarding the results of your test.  If you are on MyChart you will be notified as soon as the provider has reviewed the results and signed off on them.    [facility]: [phone]     If you have questions regarding Covid-19 and the Covid-19 vaccine, please visit this website.    [email or web address]

## 2021-01-27 NOTE — PROGRESS NOTES
HPI       Raul Oates is a 50 year old man who presents for follow up on his blood pressure, hematuria and obesity.   Chief Complaint   Patient presents with     Hypertension     Follow up on hypertension.     Hypertension: ongoing, has developed cough with lisinopril, mouth is very dry.   Hematuria:this only happens at work. He has not followed up with urology as he is concerned about the tests ordered. He is frightened to do them.   Morbid Obesity:down 2 lbs since October, trying to eat a clean diet.      Problem, Medication and Allergy Lists were reviewed and updated if needed.    Patient is an established patient of this clinic.         Review of Systems:   Review of Systems     Constitutional:  Negative for fever, chills and fatigue.               Physical Exam:     Vitals:    01/27/21 1148 01/27/21 1150   BP: (!) 148/99 (!) 146/90   Pulse: 87    Temp: 98.3  F (36.8  C)    TempSrc: Oral    SpO2: 94%    Weight: 107.8 kg (237 lb 11.2 oz)      Body mass index is 39.56 kg/m .  Vitals were reviewed and were normal.     Physical Exam  Vitals signs reviewed.   Constitutional:       Appearance: Normal appearance.   HENT:      Head: Normocephalic.   Musculoskeletal: Normal range of motion.   Skin:     General: Skin is warm and dry.   Neurological:      General: No focal deficit present.      Mental Status: He is alert and oriented to person, place, and time.   Psychiatric:         Mood and Affect: Mood normal.         Behavior: Behavior normal.           Results:   NO diagnostics ordered today.     Assessment and Plan     1. Essential hypertension    - losartan (COZAAR) 25 MG tablet; Take 1 tablet (25 mg) by mouth 2 times daily  Dispense: 60 tablet; Refill: 0    2. Hematuria, unspecified type      3. Morbid obesity (H)    I spent a total of 30 minutes face-to-face with Rual during today's office visit.  Over 50% of this time was spent counseling the patient and/or coordinating care regarding their care.   See note for details. First, please stop your combo blood pressure medication and start the Cozaar twice daily. Next, follow up with urology for testing, reassurance provided. Next, return for follow up on blood pressure in 2 weeks. Options for treatment and follow-up care were reviewed with the patient. Raul Oates  engaged in the decision making process and verbalized understanding of the options discussed and agreed with the final plan.  Xochitl Nolan, BOBBY, CNP

## 2021-01-27 NOTE — NURSING NOTE
50 year old  Chief Complaint   Patient presents with     Hypertension     Follow up on hypertension.       Blood pressure (!) 146/90, pulse 87, temperature 98.3  F (36.8  C), temperature source Oral, weight 107.8 kg (237 lb 11.2 oz), SpO2 94 %. Body mass index is 39.56 kg/m .  BP completed using cuff size:      Susie Cortes, GUS  January 27, 2021 11:51 AM

## 2021-01-28 ASSESSMENT — ENCOUNTER SYMPTOMS
FEVER: 0
FATIGUE: 0
CHILLS: 0

## 2021-04-19 ENCOUNTER — TELEPHONE (OUTPATIENT)
Dept: FAMILY MEDICINE | Facility: CLINIC | Age: 51
End: 2021-04-19

## 2021-04-19 RX ORDER — LOSARTAN POTASSIUM 25 MG/1
25 TABLET ORAL 2 TIMES DAILY
Qty: 60 TABLET | Refills: 0 | Status: SHIPPED | OUTPATIENT
Start: 2021-04-19 | End: 2021-04-21

## 2021-04-19 NOTE — TELEPHONE ENCOUNTER
Voicemail not set up. Losartan was refilled by Xochitl Nolan. Please schedule a follow up appointment.     IZAIAH Reynolds 4:23 PM  4/19/2021

## 2021-04-30 ENCOUNTER — TELEPHONE (OUTPATIENT)
Dept: FAMILY MEDICINE | Facility: CLINIC | Age: 51
End: 2021-04-30

## 2021-04-30 DIAGNOSIS — I10 ESSENTIAL HYPERTENSION: ICD-10-CM

## 2021-04-30 RX ORDER — LOSARTAN POTASSIUM 25 MG/1
25 TABLET ORAL 2 TIMES DAILY
Qty: 60 TABLET | Refills: 0 | Status: SHIPPED | OUTPATIENT
Start: 2021-04-30 | End: 2021-05-25

## 2021-04-30 NOTE — TELEPHONE ENCOUNTER
[facility] Call Center    Phone Message    May a detailed message be left on voicemail: yes     Reason for Call: Medication Question or concern regarding medication   Prescription Clarification  Name of Medication: Losartin  Prescribing Provider: Xochitl Nolan   Pharmacy: [facility] at [address]   What on the order needs clarification? Pt is calling stating he went to  his BP medication and the pharmacy said they never received it.    Please either call the pharmacy or resend the Rx.  Pt would like this done today and would like a call when this has been done          Action Taken: Message routed to:  [facility] NP    Travel Screening: Not Applicable

## 2021-04-30 NOTE — TELEPHONE ENCOUNTER
losartan (COZAAR) 25 MG table  Resent the order.   First order was not received    Latasha Thomas RN  Central Triage Red Flags/Med Refills

## 2021-05-25 DIAGNOSIS — I10 ESSENTIAL HYPERTENSION: ICD-10-CM

## 2021-05-25 RX ORDER — LOSARTAN POTASSIUM 25 MG/1
25 TABLET ORAL 2 TIMES DAILY
Qty: 60 TABLET | Refills: 0 | Status: SHIPPED | OUTPATIENT
Start: 2021-05-25 | End: 2021-06-02

## 2021-05-25 NOTE — CONFIDENTIAL NOTE
"losartan (COZAAR) 25 MG tablet   Last Written Prescription Date:  4/30/21  Last Fill Quantity: 60,   # refills: 0  Last Office Visit :1/27/21  Future Office visit:  None    \" return for follow up on blood pressure in 2 weeks\"    Scheduling has been notified to contact the pt for appointment.    Routing refill request to provider for review/approval because: last fill #60. Not seen for 2 wk f/u. rf?  "

## 2021-06-02 ENCOUNTER — OFFICE VISIT (OUTPATIENT)
Dept: FAMILY MEDICINE | Facility: CLINIC | Age: 51
End: 2021-06-02
Payer: COMMERCIAL

## 2021-06-02 VITALS
DIASTOLIC BLOOD PRESSURE: 88 MMHG | HEART RATE: 103 BPM | WEIGHT: 242 LBS | SYSTOLIC BLOOD PRESSURE: 134 MMHG | BODY MASS INDEX: 40.27 KG/M2 | TEMPERATURE: 98.6 F | OXYGEN SATURATION: 95 %

## 2021-06-02 DIAGNOSIS — R73.03 PRE-DIABETES: ICD-10-CM

## 2021-06-02 DIAGNOSIS — R74.8 ELEVATED ALKALINE PHOSPHATASE LEVEL: ICD-10-CM

## 2021-06-02 DIAGNOSIS — R31.9 HEMATURIA, UNSPECIFIED TYPE: ICD-10-CM

## 2021-06-02 DIAGNOSIS — I10 ESSENTIAL HYPERTENSION: Primary | ICD-10-CM

## 2021-06-02 LAB
ALBUMIN UR-MCNC: >499 MG/DL
ALP SERPL-CCNC: 160 U/L (ref 40–150)
APPEARANCE UR: ABNORMAL
BILIRUB UR QL STRIP: NEGATIVE
COLOR UR AUTO: YELLOW
ERYTHROCYTE [DISTWIDTH] IN BLOOD BY AUTOMATED COUNT: 13.8 % (ref 10–15)
GLUCOSE UR STRIP-MCNC: NEGATIVE MG/DL
HBA1C MFR BLD: 5.8 % (ref 0–5.6)
HCT VFR BLD AUTO: 47 % (ref 40–53)
HGB BLD-MCNC: 15.6 G/DL (ref 13.3–17.7)
HGB UR QL STRIP: ABNORMAL
KETONES UR STRIP-MCNC: NEGATIVE MG/DL
LEUKOCYTE ESTERASE UR QL STRIP: ABNORMAL
MCH RBC QN AUTO: 28.5 PG (ref 26.5–33)
MCHC RBC AUTO-ENTMCNC: 33.2 G/DL (ref 31.5–36.5)
MCV RBC AUTO: 86 FL (ref 78–100)
MUCOUS THREADS #/AREA URNS LPF: PRESENT /LPF
NITRATE UR QL: POSITIVE
PH UR STRIP: 5 PH (ref 5–7)
PLATELET # BLD AUTO: 243 10E9/L (ref 150–450)
RBC # BLD AUTO: 5.47 10E12/L (ref 4.4–5.9)
RBC #/AREA URNS AUTO: >182 /HPF (ref 0–2)
SOURCE: ABNORMAL
SP GR UR STRIP: 1.02 (ref 1–1.03)
SQUAMOUS #/AREA URNS AUTO: 13 /HPF (ref 0–1)
UROBILINOGEN UR STRIP-MCNC: 0 MG/DL (ref 0–2)
WBC # BLD AUTO: 7.9 10E9/L (ref 4–11)
WBC #/AREA URNS AUTO: >182 /HPF (ref 0–5)

## 2021-06-02 PROCEDURE — 99000 SPECIMEN HANDLING OFFICE-LAB: CPT | Performed by: PATHOLOGY

## 2021-06-02 PROCEDURE — 83036 HEMOGLOBIN GLYCOSYLATED A1C: CPT | Performed by: PATHOLOGY

## 2021-06-02 PROCEDURE — 36415 COLL VENOUS BLD VENIPUNCTURE: CPT | Performed by: PATHOLOGY

## 2021-06-02 PROCEDURE — 99213 OFFICE O/P EST LOW 20 MIN: CPT | Performed by: NURSE PRACTITIONER

## 2021-06-02 PROCEDURE — 87086 URINE CULTURE/COLONY COUNT: CPT | Mod: 90 | Performed by: PATHOLOGY

## 2021-06-02 PROCEDURE — 84075 ASSAY ALKALINE PHOSPHATASE: CPT | Performed by: PATHOLOGY

## 2021-06-02 PROCEDURE — 85027 COMPLETE CBC AUTOMATED: CPT | Performed by: PATHOLOGY

## 2021-06-02 PROCEDURE — 81001 URINALYSIS AUTO W/SCOPE: CPT | Performed by: PATHOLOGY

## 2021-06-02 RX ORDER — LOSARTAN POTASSIUM 25 MG/1
25 TABLET ORAL 2 TIMES DAILY
Qty: 180 TABLET | Refills: 3 | Status: SHIPPED | OUTPATIENT
Start: 2021-06-02 | End: 2022-03-11

## 2021-06-02 ASSESSMENT — PAIN SCALES - GENERAL: PAINLEVEL: MILD PAIN (3)

## 2021-06-02 NOTE — PROGRESS NOTES
BEENA       Raul Oates is a 50 year old man who presents for follow up.   Chief Complaint   Patient presents with     Recheck Medication     Follow up.   Essential hypertension: has not checked bp, taking Cozaar 25 mg po bid without any side effects  Elevated alkaline phosphatase level: this was up to 166 with last level drawn  Pre-diabetes: A1C 5.7 at last check  Hematuria: recurrent hematuria, referrals sent for urology multiple times, patient not seen    Problem, Medication and Allergy Lists were reviewed and updated if needed.    Patient is an established patient of this clinic.    Current Outpatient Medications   Medication     losartan (COZAAR) 25 MG tablet     metFORMIN (GLUCOPHAGE) 500 MG tablet     Current Facility-Administered Medications   Medication     lidocaine (PF) (XYLOCAINE) 1 % injection 4 mL     triamcinolone (KENALOG-40) injection 40 mg          Review of Systems:   Review of Systems     Constitutional:  Negative for fever, chills and fatigue.               Physical Exam:     Vitals:    06/02/21 1237   BP: (!) 74/45   Pulse: 103   Temp: 98.6  F (37  C)   TempSrc: Oral   SpO2: 95%     There is no height or weight on file to calculate BMI.  Vitals were reviewed and were normal.     Physical Exam  Vitals signs reviewed.   Constitutional:       Appearance: Normal appearance.   HENT:      Head: Normocephalic.   Musculoskeletal: Normal range of motion.   Skin:     General: Skin is warm and dry.   Neurological:      General: No focal deficit present.      Mental Status: He is alert and oriented to person, place, and time.   Psychiatric:         Mood and Affect: Mood normal.         Behavior: Behavior normal.         Results:   Labs pending.   Assessment and Plan     1. Essential hypertension    - losartan (COZAAR) 25 MG tablet; Take 1 tablet (25 mg) by mouth 2 times daily  Dispense: 180 tablet; Refill: 3    2. Elevated alkaline phosphatase level    - Alkaline phosphatase; Future    3.  Pre-diabetes    - Hemoglobin A1c; Future    4. Hematuria, unspecified type    - UA with Micro reflex to Culture; Future  - CBC with platelets; Future    First, have labs completed today. Next, return via phone visit for follow up and review. Options for treatment and follow-up care were reviewed with the patient. Raul Oates  engaged in the decision making process and verbalized understanding of the options discussed and agreed with the final plan.  Xochitl Nolan, APRN, CNP

## 2021-06-02 NOTE — NURSING NOTE
50 year old  Chief Complaint   Patient presents with     Recheck Medication     Follow up.       Susie Cortes, IZAIAH  June 2, 2021 12:37 PM

## 2021-06-02 NOTE — PATIENT INSTRUCTIONS
[facility] Medication Refill Request Information:  * Please contact your pharmacy regarding ANY request for medication refills.  ** NP Clinic Prescription Fax = [phone]  * Please allow 3 business days for routine medication refills.  * Please allow 5 business days for controlled substance medication refills.     [facility] Test Result notification information:  *You will be notified with in 7-10 days of your appointment day regarding the results of your test.  If you are on MyChart you will be notified as soon as the provider has reviewed the results and signed off on them.    [facility]: [phone]     If you have questions regarding Covid-19 and the Covid-19 vaccine, please visit this website.    [email or web address]

## 2021-06-03 ENCOUNTER — TELEPHONE (OUTPATIENT)
Dept: UROLOGY | Facility: CLINIC | Age: 51
End: 2021-06-03

## 2021-06-03 ENCOUNTER — VIRTUAL VISIT (OUTPATIENT)
Dept: FAMILY MEDICINE | Facility: CLINIC | Age: 51
End: 2021-06-03
Payer: COMMERCIAL

## 2021-06-03 DIAGNOSIS — R74.8 ELEVATED ALKALINE PHOSPHATASE LEVEL: ICD-10-CM

## 2021-06-03 DIAGNOSIS — R73.03 PRE-DIABETES: Primary | ICD-10-CM

## 2021-06-03 DIAGNOSIS — R31.9 HEMATURIA, UNSPECIFIED TYPE: ICD-10-CM

## 2021-06-03 PROCEDURE — 99214 OFFICE O/P EST MOD 30 MIN: CPT | Mod: TEL | Performed by: NURSE PRACTITIONER

## 2021-06-03 ASSESSMENT — PAIN SCALES - GENERAL: PAINLEVEL: EXTREME PAIN (9)

## 2021-06-03 ASSESSMENT — ENCOUNTER SYMPTOMS
CHILLS: 0
FEVER: 0
FATIGUE: 0

## 2021-06-03 NOTE — PATIENT INSTRUCTIONS
[facility] Medication Refill Request Information:  * Please contact your pharmacy regarding ANY request for medication refills.  ** NP Clinic Prescription Fax = [phone]  * Please allow 3 business days for routine medication refills.  * Please allow 5 business days for controlled substance medication refills.     [facility] Test Result notification information:  *You will be notified with in 7-10 days of your appointment day regarding the results of your test.  If you are on MyChart you will be notified as soon as the provider has reviewed the results and signed off on them.    [facility]: [phone]     If you have questions regarding Covid-19 and the Covid-19 vaccine, please visit this website.    [email or web address]

## 2021-06-03 NOTE — TELEPHONE ENCOUNTER
[facility] Call Center    Phone Message    May a detailed message be left on voicemail: yes     Reason for Call: Appointment Intake    Referring Provider Name: Xochitl Nolan NP  Diagnosis and/or Symptoms: hematuria, unspecified    Action Taken: Message routed to:  [facility] uro    Travel Screening: Not Applicable

## 2021-06-03 NOTE — NURSING NOTE
50 year old  Chief Complaint   Patient presents with     Results     Follow up on test results.       Susie Cortes, IZAIAH  Elvira 3, 2021 11:29 AM

## 2021-06-03 NOTE — PROGRESS NOTES
Raul is a 50 year old who is being evaluated via a billable telephone visit.      What phone number would you like to be contacted at? [phone]  How would you like to obtain your AVS? Javidroberth Carter is a 50 year old who presents for follow up on recent lab results.     HPI   Pre-diabetes: A1C up to 5.8 from 5.7; has gained weight.  Elevated alk phos: Level is down to 160 from 166, no symptoms or concerns.    Hematuria:recent UA shows large blood among other abnormalities, patient is due to be seen in Urology.     Review of Systems   Constitutional, HEENT, cardiovascular, pulmonary, gi and gu systems are negative, except as otherwise noted.  Current Outpatient Medications   Medication     losartan (COZAAR) 25 MG tablet     Current Facility-Administered Medications   Medication     lidocaine (PF) (XYLOCAINE) 1 % injection 4 mL     triamcinolone (KENALOG-40) injection 40 mg     Objective         Physical Exam   Healthy, alert and no distress  PSYCH: Alert and oriented times 3; coherent speech, normal   rate and volume, able to articulate logical thoughts, able   to abstract reason, no tangential thoughts, no hallucinations   or delusions  His affect is normal  RESP: No cough, no audible wheezing, able to talk in full sentences  Remainder of exam unable to be completed due to telephone visits    Recent lab results:   Component      Latest Ref Rng & Units 6/2/2021   Color Urine       Yellow   Appearance Urine       Cloudy   Glucose Urine      NEG:Negative mg/dL Negative   Bilirubin Urine      NEG:Negative Negative   Ketones Urine      NEG:Negative mg/dL Negative   Specific Gravity Urine      1.003 - 1.035 1.022   Blood Urine      NEG:Negative Large (A)   pH Urine      5.0 - 7.0 pH 5.0   Protein Albumin Urine      NEG:Negative mg/dL >499 (A)   Urobilinogen mg/dL      0.0 - 2.0 mg/dL 0.0   Nitrite Urine      NEG:Negative Positive (A)   Leukocyte Esterase Urine      NEG:Negative Moderate (A)   Source        Midstream Urine   WBC Urine      0 - 5 /HPF >182 (H)   RBC Urine      0 - 2 /HPF >182 (H)   Squamous Epithelial /HPF Urine      0 - 1 /HPF 13 (H)   Mucous Urine      NEG:Negative /LPF Present (A)   WBC      4.0 - 11.0 10e9/L 7.9   RBC Count      4.4 - 5.9 10e12/L 5.47   Hemoglobin      13.3 - 17.7 g/dL 15.6   Hematocrit      40.0 - 53.0 % 47.0   MCV      78 - 100 fl 86   MCH      26.5 - 33.0 pg 28.5   MCHC      31.5 - 36.5 g/dL 33.2   RDW      10.0 - 15.0 % 13.8   Platelet Count      150 - 450 10e9/L 243   Specimen Description       Midstream Urine   Special Requests       Specimen received in preservative   Culture Micro       Culture in progress   Hemoglobin A1C      0 - 5.6 % 5.8 (H)   Alkaline Phosphatase      40 - 150 U/L 160 (H)     Phone call duration: 10 minutes    Assessment & Plan     Pre-diabetes    Elevated alkaline phosphatase level      Hematuria, unspecified type    Return in about 3 months (around 9/3/2021) for in person.    First, please start Metformin, take this twice daily. Next, continue to work on lifestyle with weight loss and increasing activity. Next, see Urology in consult. Finally, follow up on your overall status in 1 month in clinic. He verbalizes understanding of the plan.   Xochitl Nolan, APRN, CNP

## 2021-06-04 LAB
BACTERIA SPEC CULT: NORMAL
BACTERIA SPEC CULT: NORMAL
Lab: NORMAL
SPECIMEN SOURCE: NORMAL

## 2021-06-17 DIAGNOSIS — R73.03 PRE-DIABETES: Primary | ICD-10-CM

## 2021-08-03 ENCOUNTER — PRE VISIT (OUTPATIENT)
Dept: UROLOGY | Facility: CLINIC | Age: 51
End: 2021-08-03

## 2021-08-03 NOTE — TELEPHONE ENCOUNTER
Reason for visit: Cystoscopy     Relevant information: gross hematuria    Records/imaging/labs/orders: in Epic; CT urogram scheduled    Pt called: no    At Rooming: urine sample

## 2021-09-19 ENCOUNTER — HEALTH MAINTENANCE LETTER (OUTPATIENT)
Age: 51
End: 2021-09-19

## 2021-11-14 ENCOUNTER — HEALTH MAINTENANCE LETTER (OUTPATIENT)
Age: 51
End: 2021-11-14

## 2022-01-09 ENCOUNTER — HEALTH MAINTENANCE LETTER (OUTPATIENT)
Age: 52
End: 2022-01-09

## 2022-01-25 ENCOUNTER — OFFICE VISIT (OUTPATIENT)
Dept: ORTHOPEDICS | Facility: CLINIC | Age: 52
End: 2022-01-25
Payer: COMMERCIAL

## 2022-01-25 VITALS — RESPIRATION RATE: 18 BRPM | WEIGHT: 205 LBS | HEIGHT: 66 IN | BODY MASS INDEX: 32.95 KG/M2

## 2022-01-25 DIAGNOSIS — M54.16 LUMBAR RADICULAR PAIN: Primary | ICD-10-CM

## 2022-01-25 DIAGNOSIS — M16.11 PRIMARY OSTEOARTHRITIS OF RIGHT HIP: ICD-10-CM

## 2022-01-25 DIAGNOSIS — M51.369 DDD (DEGENERATIVE DISC DISEASE), LUMBAR: ICD-10-CM

## 2022-01-25 PROCEDURE — 99214 OFFICE O/P EST MOD 30 MIN: CPT | Performed by: PREVENTIVE MEDICINE

## 2022-01-25 RX ORDER — METHOCARBAMOL 500 MG/1
500-1000 TABLET, FILM COATED ORAL
Qty: 60 TABLET | Refills: 0 | Status: SHIPPED | OUTPATIENT
Start: 2022-01-25 | End: 2023-03-03

## 2022-01-25 RX ORDER — MELOXICAM 15 MG/1
15 TABLET ORAL DAILY
Qty: 30 TABLET | Refills: 1 | Status: SHIPPED | OUTPATIENT
Start: 2022-01-25 | End: 2023-01-11

## 2022-01-25 ASSESSMENT — MIFFLIN-ST. JEOR: SCORE: 1729.87

## 2022-01-25 NOTE — LETTER
1/25/2022      RE: Raul Oates  [address]  [facility] [address]       HISTORY OF PRESENT ILLNESS  Mr. Oates is a pleasant 51 year old year old male who presents to clinic today with low back, right hip pain  Raul explains that his low back and right hip pain has worsened  Location: low back and right hip  Quality:  achy pain    Severity: 7/10 at worst    Duration: worse over past several months  Timing: occurs intermittently    Modifying factors:  resting and non-use makes it better, movement and use makes it worse  Associated signs & symptoms: radiation into legs    MEDICAL HISTORY  Patient Active Problem List   Diagnosis     Hyperlipidemia LDL goal <160     History of total hip arthroplasty, left     Essential hypertension, benign     Hypertension     Morbid obesity (H)       Current Outpatient Medications   Medication Sig Dispense Refill     losartan (COZAAR) 25 MG tablet Take 1 tablet (25 mg) by mouth 2 times daily 180 tablet 3     metFORMIN (GLUCOPHAGE) 500 MG tablet TAKE 1 TABLET BY MOUTH TWICE A DAY WITH MEALS 180 tablet 1       Allergies   Allergen Reactions     No Known Allergies        Family History   Problem Relation Age of Onset     Unknown/Adopted Mother      Coronary Artery Disease Maternal Grandfather      Unknown/Adopted Father      Social History     Socioeconomic History     Marital status: Single     Spouse name: None     Number of children: None     Years of education: None     Highest education level: None   Occupational History     None   Tobacco Use     Smoking status: Never Smoker     Smokeless tobacco: Never Used   Substance and Sexual Activity     Alcohol use: No     Alcohol/week: 0.0 standard drinks     Drug use: No     Sexual activity: Not Currently   Other Topics Concern     None   Social History Narrative     None     Social Determinants of Health     Financial Resource Strain: Not on file   Food Insecurity: Not on file   Transportation Needs: Not on file  "  Physical Activity: Not on file   Stress: Not on file   Social Connections: Not on file   Intimate Partner Violence: Not on file   Housing Stability: Not on file       Additional medical/Social/Surgical histories reviewed in [facility] and updated as appropriate.     REVIEW OF SYSTEMS (1/25/2022)  10 point ROS of systems including Constitutional, Eyes, Respiratory, Cardiovascular, Gastroenterology, Genitourinary, Integumentary, Musculoskeletal, Psychiatric, Allergic/Immunologic were all negative except for pertinent positives noted in my HPI.     PHYSICAL EXAM  Vitals:    01/25/22 1525   Resp: 18   Weight: 93 kg (205 lb)   Height: 1.68 m (5' 6.14\")     Vital Signs: Resp 18   Ht 1.68 m (5' 6.14\")   Wt 93 kg (205 lb)   BMI 32.95 kg/m   Patient declined being weighed. Body mass index is 32.95 kg/m .    General  - normal appearance, in no obvious distress  HEENT  - conjunctivae not injected, moist mucous membranes, normocephalic/atraumatic head, ears normal appearance, no lesions, mouth normal appearance, no scars, normal dentition and teeth present  CV  - normal peripheral perfusion  Pulm  - normal respiratory pattern, non-labored  Musculoskeletal - lumbar spine  - stance: normal gait without limp, no obvious leg length discrepancy, normal heel and toe walk  - inspection: normal bone and joint alignment, no obvious scoliosis  - palpation: no paravertebral or bony tenderness  - ROM: flexion exacerbates pain, normal extension, sidebending, rotation  - strength: lower extremities 5/5 in all planes  - special tests:  (+) straight leg raise  (+) slump test  Neuro  - patellar and Achilles DTRs 2+ bilaterally,  Some lower extremity sensory deficit throughout L5 distribution, grossly normal coordination, normal muscle tone  Skin  - no ecchymosis, erythema, warmth, or induration, no obvious rash  Psych  - interactive, appropriate, normal mood and affect    ASSESSMENT & PLAN  50 yo male with lumbar ddd, radicular pain  I " independently reviewed the following imaging studies:  Lumbar xray: shows ddd  Discussed and ordered lumbar MRI  Rx given for mobic and robaxin    Appropriate PPE was utilized for prevention of spread of Covid-19.  Ceasar Orantes MD, CAM

## 2022-01-25 NOTE — NURSING NOTE
"Reason For Visit:   Chief Complaint   Patient presents with     RECHECK     discuss lumbar back surgery pain options.        Resp 18   Ht 1.68 m (5' 6.14\")   Wt 93 kg (205 lb)   BMI 32.95 kg/m      Pain Assessment  Patient Currently in Pain: Yes  0-10 Pain Scale: 6  Primary Pain Location: Back    Susie Yang ATC       "

## 2022-01-25 NOTE — PROGRESS NOTES
HISTORY OF PRESENT ILLNESS  Mr. Oates is a pleasant 51 year old year old male who presents to clinic today with low back, right hip pain  Raul explains that his low back and right hip pain has worsened  Location: low back and right hip  Quality:  achy pain    Severity: 7/10 at worst    Duration: worse over past several months  Timing: occurs intermittently    Modifying factors:  resting and non-use makes it better, movement and use makes it worse  Associated signs & symptoms: radiation into legs    MEDICAL HISTORY  Patient Active Problem List   Diagnosis     Hyperlipidemia LDL goal <160     History of total hip arthroplasty, left     Essential hypertension, benign     Hypertension     Morbid obesity (H)       Current Outpatient Medications   Medication Sig Dispense Refill     losartan (COZAAR) 25 MG tablet Take 1 tablet (25 mg) by mouth 2 times daily 180 tablet 3     metFORMIN (GLUCOPHAGE) 500 MG tablet TAKE 1 TABLET BY MOUTH TWICE A DAY WITH MEALS 180 tablet 1       Allergies   Allergen Reactions     No Known Allergies        Family History   Problem Relation Age of Onset     Unknown/Adopted Mother      Coronary Artery Disease Maternal Grandfather      Unknown/Adopted Father      Social History     Socioeconomic History     Marital status: Single     Spouse name: None     Number of children: None     Years of education: None     Highest education level: None   Occupational History     None   Tobacco Use     Smoking status: Never Smoker     Smokeless tobacco: Never Used   Substance and Sexual Activity     Alcohol use: No     Alcohol/week: 0.0 standard drinks     Drug use: No     Sexual activity: Not Currently   Other Topics Concern     None   Social History Narrative     None     Social Determinants of Health     Financial Resource Strain: Not on file   Food Insecurity: Not on file   Transportation Needs: Not on file   Physical Activity: Not on file   Stress: Not on file   Social Connections: Not on file  "  Intimate Partner Violence: Not on file   Housing Stability: Not on file       Additional medical/Social/Surgical histories reviewed in [facility] and updated as appropriate.     REVIEW OF SYSTEMS (1/25/2022)  10 point ROS of systems including Constitutional, Eyes, Respiratory, Cardiovascular, Gastroenterology, Genitourinary, Integumentary, Musculoskeletal, Psychiatric, Allergic/Immunologic were all negative except for pertinent positives noted in my HPI.     PHYSICAL EXAM  Vitals:    01/25/22 1525   Resp: 18   Weight: 93 kg (205 lb)   Height: 1.68 m (5' 6.14\")     Vital Signs: Resp 18   Ht 1.68 m (5' 6.14\")   Wt 93 kg (205 lb)   BMI 32.95 kg/m   Patient declined being weighed. Body mass index is 32.95 kg/m .    General  - normal appearance, in no obvious distress  HEENT  - conjunctivae not injected, moist mucous membranes, normocephalic/atraumatic head, ears normal appearance, no lesions, mouth normal appearance, no scars, normal dentition and teeth present  CV  - normal peripheral perfusion  Pulm  - normal respiratory pattern, non-labored  Musculoskeletal - lumbar spine  - stance: normal gait without limp, no obvious leg length discrepancy, normal heel and toe walk  - inspection: normal bone and joint alignment, no obvious scoliosis  - palpation: no paravertebral or bony tenderness  - ROM: flexion exacerbates pain, normal extension, sidebending, rotation  - strength: lower extremities 5/5 in all planes  - special tests:  (+) straight leg raise  (+) slump test  Neuro  - patellar and Achilles DTRs 2+ bilaterally,  Some lower extremity sensory deficit throughout L5 distribution, grossly normal coordination, normal muscle tone  Skin  - no ecchymosis, erythema, warmth, or induration, no obvious rash  Psych  - interactive, appropriate, normal mood and affect    ASSESSMENT & PLAN  52 yo male with lumbar ddd, radicular pain  I independently reviewed the following imaging studies:  Lumbar xray: shows ddd  Discussed and " ordered lumbar MRI  Rx given for mobic and robaxin    Appropriate PPE was utilized for prevention of spread of Covid-19.  Ceasar Orantes MD, CAQSM

## 2022-01-26 ENCOUNTER — TELEPHONE (OUTPATIENT)
Dept: ORTHOPEDICS | Facility: CLINIC | Age: 52
End: 2022-01-26
Payer: COMMERCIAL

## 2022-01-26 NOTE — TELEPHONE ENCOUNTER
----- Message from Susie Lee sent at 1/25/2022  4:37 PM CST -----  Regarding: alton follow up  Schedule referral to [facility]  Schedule MRI and follow up after completion

## 2022-01-27 ENCOUNTER — TELEPHONE (OUTPATIENT)
Dept: ORTHOPEDICS | Facility: CLINIC | Age: 52
End: 2022-01-27
Payer: COMMERCIAL

## 2022-02-23 ENCOUNTER — ANCILLARY PROCEDURE (OUTPATIENT)
Dept: MRI IMAGING | Facility: CLINIC | Age: 52
End: 2022-02-23
Attending: PREVENTIVE MEDICINE
Payer: COMMERCIAL

## 2022-02-23 DIAGNOSIS — M54.16 LUMBAR RADICULAR PAIN: ICD-10-CM

## 2022-02-23 DIAGNOSIS — M51.369 DDD (DEGENERATIVE DISC DISEASE), LUMBAR: ICD-10-CM

## 2022-02-23 PROCEDURE — 72148 MRI LUMBAR SPINE W/O DYE: CPT | Performed by: STUDENT IN AN ORGANIZED HEALTH CARE EDUCATION/TRAINING PROGRAM

## 2022-03-04 ENCOUNTER — TELEPHONE (OUTPATIENT)
Dept: FAMILY MEDICINE | Facility: CLINIC | Age: 52
End: 2022-03-04
Payer: COMMERCIAL

## 2022-03-04 NOTE — TELEPHONE ENCOUNTER
Patient called wanting a refill for losartan (COZAAR) 25 MG tablet, and mentioned the NP they used to see was Xochitl Nolan, which she is no longer a part of this organization. I informed the patient that it most likely can be filled for a emanuel period and to make an appointment to establish care with another provider to check on pt's blood pressure and to continue receiving refills.    [facility] [id number] [place] [facility] - [place], [place] - [address] Anika F, MA 4:50 PM 3/4/2022

## 2022-03-04 NOTE — TELEPHONE ENCOUNTER
losartan (COZAAR) 25 MG tablet: RF available, pharm called confirmed. Pharm will process RF  pt notified and understood.   Last Rx 6-2-21 for 180 tab w/3 RF

## 2022-03-11 DIAGNOSIS — I10 ESSENTIAL HYPERTENSION: ICD-10-CM

## 2022-03-11 RX ORDER — LOSARTAN POTASSIUM 25 MG/1
25 TABLET ORAL 2 TIMES DAILY
Qty: 180 TABLET | Refills: 0 | Status: SHIPPED | OUTPATIENT
Start: 2022-03-11 | End: 2023-01-11

## 2022-03-11 NOTE — PROGRESS NOTES
Patient called clinic 3/11/22 4:50 PM requesting refill on losartan. Reports that his current pharmacy is experiencing a shortage and cannot obtain medication until June. Patient previous patient for Xochitl Nolan NP. Was given 1 year of medication on 6/17/21. Allergies, medications, and chart reviewed. Refill for 3 months sent to new preferred pharmacy. Patient instructed to follow-up with new provider in 3 months to reassess blood pressure and medication response.     BOBBY Anders CNP 03/11/2022 4:55 PM   [facility]

## 2022-03-11 NOTE — TELEPHONE ENCOUNTER
Patient called requesting a refill for their blood pressure medication, Xochitl Nolan was their PCP. Stated last time he wasn't able to receive it till June, pt said they would really like to have these filled they are feeling some chest discomfort.    Preferred [facility]acy: [facility]  [address], [place] [address]    Varsha HICKEY MA 4:46 PM 3/11/2022

## 2022-04-05 ENCOUNTER — OFFICE VISIT (OUTPATIENT)
Dept: ORTHOPEDICS | Facility: CLINIC | Age: 52
End: 2022-04-05
Payer: COMMERCIAL

## 2022-04-05 VITALS — BODY MASS INDEX: 32.95 KG/M2 | RESPIRATION RATE: 18 BRPM | HEIGHT: 66 IN | WEIGHT: 205 LBS

## 2022-04-05 DIAGNOSIS — M51.369 DDD (DEGENERATIVE DISC DISEASE), LUMBAR: ICD-10-CM

## 2022-04-05 DIAGNOSIS — M16.11 PRIMARY OSTEOARTHRITIS OF RIGHT HIP: Primary | ICD-10-CM

## 2022-04-05 PROCEDURE — 99214 OFFICE O/P EST MOD 30 MIN: CPT | Performed by: PREVENTIVE MEDICINE

## 2022-04-05 RX ORDER — METHYLPREDNISOLONE 4 MG
TABLET, DOSE PACK ORAL
Qty: 21 TABLET | Refills: 0 | Status: SHIPPED | OUTPATIENT
Start: 2022-04-05 | End: 2023-01-11

## 2022-04-05 RX ORDER — CELECOXIB 100 MG/1
100 CAPSULE ORAL 2 TIMES DAILY
Qty: 60 CAPSULE | Refills: 1 | Status: SHIPPED | OUTPATIENT
Start: 2022-04-05 | End: 2023-03-03

## 2022-04-05 RX ORDER — METHOCARBAMOL 750 MG/1
750 TABLET, FILM COATED ORAL
Qty: 30 TABLET | Refills: 1 | Status: SHIPPED | OUTPATIENT
Start: 2022-04-05 | End: 2023-03-03

## 2022-04-05 NOTE — PROGRESS NOTES
HISTORY OF PRESENT ILLNESS  Mr. Oates is a pleasant 51 year old year old male who presents to clinic today with   Raul explains that his lumbar MRI was completed and would like to review  Continues to have pain in low back that radiates into legs  Location: low back  Quality:  achy pain    Severity: 7/10 at worst    Duration: worse over past several months  Timing: occurs intermittently  Context: occurs while exercising and lifting  Modifying factors:  resting and non-use makes it better, movement and use makes it worse  Associated signs & symptoms: radiation into legs    MEDICAL HISTORY  Patient Active Problem List   Diagnosis     Hyperlipidemia LDL goal <160     History of total hip arthroplasty, left     Essential hypertension, benign     Hypertension     Morbid obesity (H)       Current Outpatient Medications   Medication Sig Dispense Refill     losartan (COZAAR) 25 MG tablet Take 1 tablet (25 mg) by mouth 2 times daily 180 tablet 0     meloxicam (MOBIC) 15 MG tablet Take 1 tablet (15 mg) by mouth daily 30 tablet 1     metFORMIN (GLUCOPHAGE) 500 MG tablet TAKE 1 TABLET BY MOUTH TWICE A DAY WITH MEALS 180 tablet 1     methocarbamol (ROBAXIN) 500 MG tablet Take 1-2 tablets (500-1,000 mg) by mouth nightly as needed for muscle spasms 60 tablet 0       Allergies   Allergen Reactions     No Known Allergies        Family History   Problem Relation Age of Onset     Unknown/Adopted Mother      Coronary Artery Disease Maternal Grandfather      Unknown/Adopted Father      Social History     Socioeconomic History     Marital status: Single     Spouse name: None     Number of children: None     Years of education: None     Highest education level: None   Occupational History     None   Tobacco Use     Smoking status: Never Smoker     Smokeless tobacco: Never Used   Substance and Sexual Activity     Alcohol use: No     Alcohol/week: 0.0 standard drinks     Drug use: No     Sexual activity: Not Currently   Other Topics  "Concern     None   Social History Narrative     None     Social Determinants of Health     Financial Resource Strain: Not on file   Food Insecurity: Not on file   Transportation Needs: Not on file   Physical Activity: Not on file   Stress: Not on file   Social Connections: Not on file   Intimate Partner Violence: Not on file   Housing Stability: Not on file       Additional medical/Social/Surgical histories reviewed in [facility] and updated as appropriate.     REVIEW OF SYSTEMS (4/5/2022)  10 point ROS of systems including Constitutional, Eyes, Respiratory, Cardiovascular, Gastroenterology, Genitourinary, Integumentary, Musculoskeletal, Psychiatric, Allergic/Immunologic were all negative except for pertinent positives noted in my HPI.     PHYSICAL EXAM  Vitals:    04/05/22 1450   Resp: 18   Weight: 93 kg (205 lb)   Height: 1.68 m (5' 6.14\")     Vital Signs: Resp 18   Ht 1.68 m (5' 6.14\")   Wt 93 kg (205 lb)   BMI 32.95 kg/m   Patient declined being weighed. Body mass index is 32.95 kg/m .    General  - normal appearance, in no obvious distress  HEENT  - conjunctivae not injected, moist mucous membranes, normocephalic/atraumatic head, ears normal appearance, no lesions, mouth normal appearance, no scars, normal dentition and teeth present  CV  - normal peripheral perfusion  Pulm  - normal respiratory pattern, non-labored  Musculoskeletal - lumbar spine  - stance: normal gait without limp, no obvious leg length discrepancy, normal heel and toe walk  - inspection: normal bone and joint alignment, no obvious scoliosis  - palpation: no paravertebral or bony tenderness  - ROM: flexion exacerbates pain, normal extension, sidebending, rotation  - strength: lower extremities 5/5 in all planes  - special tests:  (+) straight leg raise  (+) slump test  Neuro  - patellar and Achilles DTRs 2+ bilaterally, some lower extremity sensory deficit throughout L5 distribution, grossly normal coordination, normal muscle tone  Skin  - no " ecchymosis, erythema, warmth, or induration, no obvious rash  Psych  - interactive, appropriate, normal mood and affect  ASSESSMENT & PLAN  52 yo male with lumbar ddd, disc herniations, radicular pain, not resolved    I independently reviewed the following imaging studies:  Lumbar MRI: shows ddd, disc herniations  Discussed and ordered KOURTNEY  RX given for celebrex, robaxin and medrol  Given HEP  Given referral to hip surgeon for consultation for right hip arthritis    F/u in 1-2 months  Appropriate PPE was utilized for prevention of spread of Covid-19.  Ceasar Orantes MD, CAQSM

## 2022-04-05 NOTE — LETTER
April 5, 2022      RE: Raul Oates  [address]  [facility] [address]        To whom it may concern:    Raul Oates is under my professional care for a medical condition.  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  No Backpack vacuuming.  Please contact me with any questions or concerns.      Sincerely,      Ceasar rOantes MD

## 2022-04-05 NOTE — LETTER
4/5/2022      RE: Raul Oates  [address]  [facility] [address]       HISTORY OF PRESENT ILLNESS  Mr. Oates is a pleasant 51 year old year old male who presents to clinic today with   Raul explains that his lumbar MRI was completed and would like to review  Continues to have pain in low back that radiates into legs  Location: low back  Quality:  achy pain    Severity: 7/10 at worst    Duration: worse over past several months  Timing: occurs intermittently  Context: occurs while exercising and lifting  Modifying factors:  resting and non-use makes it better, movement and use makes it worse  Associated signs & symptoms: radiation into legs    MEDICAL HISTORY  Patient Active Problem List   Diagnosis     Hyperlipidemia LDL goal <160     History of total hip arthroplasty, left     Essential hypertension, benign     Hypertension     Morbid obesity (H)       Current Outpatient Medications   Medication Sig Dispense Refill     losartan (COZAAR) 25 MG tablet Take 1 tablet (25 mg) by mouth 2 times daily 180 tablet 0     meloxicam (MOBIC) 15 MG tablet Take 1 tablet (15 mg) by mouth daily 30 tablet 1     metFORMIN (GLUCOPHAGE) 500 MG tablet TAKE 1 TABLET BY MOUTH TWICE A DAY WITH MEALS 180 tablet 1     methocarbamol (ROBAXIN) 500 MG tablet Take 1-2 tablets (500-1,000 mg) by mouth nightly as needed for muscle spasms 60 tablet 0       Allergies   Allergen Reactions     No Known Allergies        Family History   Problem Relation Age of Onset     Unknown/Adopted Mother      Coronary Artery Disease Maternal Grandfather      Unknown/Adopted Father      Social History     Socioeconomic History     Marital status: Single     Spouse name: None     Number of children: None     Years of education: None     Highest education level: None   Occupational History     None   Tobacco Use     Smoking status: Never Smoker     Smokeless tobacco: Never Used   Substance and Sexual Activity     Alcohol use: No     Alcohol/week: 0.0  "standard drinks     Drug use: No     Sexual activity: Not Currently   Other Topics Concern     None   Social History Narrative     None     Social Determinants of Health     Financial Resource Strain: Not on file   Food Insecurity: Not on file   Transportation Needs: Not on file   Physical Activity: Not on file   Stress: Not on file   Social Connections: Not on file   Intimate Partner Violence: Not on file   Housing Stability: Not on file       Additional medical/Social/Surgical histories reviewed in [facility] and updated as appropriate.     REVIEW OF SYSTEMS (4/5/2022)  10 point ROS of systems including Constitutional, Eyes, Respiratory, Cardiovascular, Gastroenterology, Genitourinary, Integumentary, Musculoskeletal, Psychiatric, Allergic/Immunologic were all negative except for pertinent positives noted in my HPI.     PHYSICAL EXAM  Vitals:    04/05/22 1450   Resp: 18   Weight: 93 kg (205 lb)   Height: 1.68 m (5' 6.14\")     Vital Signs: Resp 18   Ht 1.68 m (5' 6.14\")   Wt 93 kg (205 lb)   BMI 32.95 kg/m   Patient declined being weighed. Body mass index is 32.95 kg/m .    General  - normal appearance, in no obvious distress  HEENT  - conjunctivae not injected, moist mucous membranes, normocephalic/atraumatic head, ears normal appearance, no lesions, mouth normal appearance, no scars, normal dentition and teeth present  CV  - normal peripheral perfusion  Pulm  - normal respiratory pattern, non-labored  Musculoskeletal - lumbar spine  - stance: normal gait without limp, no obvious leg length discrepancy, normal heel and toe walk  - inspection: normal bone and joint alignment, no obvious scoliosis  - palpation: no paravertebral or bony tenderness  - ROM: flexion exacerbates pain, normal extension, sidebending, rotation  - strength: lower extremities 5/5 in all planes  - special tests:  (+) straight leg raise  (+) slump test  Neuro  - patellar and Achilles DTRs 2+ bilaterally, some lower extremity sensory deficit " throughout L5 distribution, grossly normal coordination, normal muscle tone  Skin  - no ecchymosis, erythema, warmth, or induration, no obvious rash  Psych  - interactive, appropriate, normal mood and affect  ASSESSMENT & PLAN  52 yo male with lumbar ddd, disc herniations, radicular pain, not resolved    I independently reviewed the following imaging studies:  Lumbar MRI: shows ddd, disc herniations  Discussed and ordered KOURTNEY  RX given for celebrex, robaxin and medrol  Given HEP  Given referral to hip surgeon for consultation for right hip arthritis    F/u in 1-2 months  Appropriate PPE was utilized for prevention of spread of Covid-19.  Ceasar Orantes MD, CAQSM

## 2022-04-05 NOTE — NURSING NOTE
"Reason For Visit:   Chief Complaint   Patient presents with     RECHECK     f/u back. pt states his back 'hurts like crap.' pt requesting a mid back injection.        Resp 18   Ht 1.68 m (5' 6.14\")   Wt 93 kg (205 lb)   BMI 32.95 kg/m      Pain Assessment  Patient Currently in Pain: Yes  0-10 Pain Scale: 8  Primary Pain Location: Back    Susie Yagn ATC     "

## 2022-04-15 NOTE — TELEPHONE ENCOUNTER
DIAGNOSIS: Primary osteoarthritis of right hip    APPOINTMENT DATE: 04.28.2022   NOTES STATUS DETAILS   OFFICE NOTE from referring provider Internal 04.05.2022 Ceasar Orantes MD   MEDICATION LIST Internal    LABS     CBC/DIFF Internal 03.02.2020   CULTURES Internal 06.02.2021 UA Culture   XRAYS (IMAGES & REPORTS) Internal 07.16.2020 XR Pelvis and Hip    07.16.2020 XR Spine

## 2022-04-20 DIAGNOSIS — M25.551 RIGHT HIP PAIN: Primary | ICD-10-CM

## 2022-04-28 ENCOUNTER — PRE VISIT (OUTPATIENT)
Dept: ORTHOPEDICS | Facility: CLINIC | Age: 52
End: 2022-04-28
Payer: COMMERCIAL

## 2022-04-28 ENCOUNTER — OFFICE VISIT (OUTPATIENT)
Dept: ORTHOPEDICS | Facility: CLINIC | Age: 52
End: 2022-04-28
Payer: COMMERCIAL

## 2022-04-28 VITALS — WEIGHT: 225 LBS | BODY MASS INDEX: 36.16 KG/M2 | HEIGHT: 66 IN

## 2022-04-28 DIAGNOSIS — M16.11 PRIMARY OSTEOARTHRITIS OF RIGHT HIP: ICD-10-CM

## 2022-04-28 PROCEDURE — 99204 OFFICE O/P NEW MOD 45 MIN: CPT | Performed by: ORTHOPAEDIC SURGERY

## 2022-04-28 ASSESSMENT — HOOS JR
GOING UP OR DOWN STAIRS: SEVERE
LYING IN BED (TURNING OVER, MAINTAINING HIP POSITION): MODERATE
HOOS JR TOTAL INTERVAL SCORE: 39.9
SITTING: MILD
BENDING TO THE FLOOR TO PICK UP OBJECT: EXTREME
WALKING ON UNEVEN SURFACE: SEVERE
RISING FROM SITTING: SEVERE

## 2022-04-28 NOTE — NURSING NOTE
"Reason For Visit:   Chief Complaint   Patient presents with     Consult     Right hip pain. Dr. Orantes refer. Played a lot of basketball when younger. Left LILIAN Dr. Reilly 2015       Ht 1.676 m (5' 6\")   Wt 102.1 kg (225 lb)   BMI 36.32 kg/m           Lakisha Segovia ATC    "

## 2022-04-28 NOTE — LETTER
4/28/2022         RE: Raul Oates  [address]  [facility] [address]        Dear Colleague,    Thank you for referring your patient, Raul Oates, to the [facility] [place]. Please see a copy of my visit note below.    Assessment: This is a 51 year old now 7 years s/p left total hip doing well and with severe pain continuing in the right, native hip associated with severe, long-standing osteoarthritis. He is very painful and worried about not being able to perform his job duties any longer ([employer]). We discussed the diagnosis and the treatment options including living with it and total hip. We spent twenty minutes discussing total hip arthroplasty.  We discussed the implants, the procedure, the risks and benefits, and the post-operative course.  We discussed blood clots, blood clots to the lungs, injury to blood vessels and nerves, dislocation, infection, and leg length difference.  All the patients questions were answered to the best of my ability.    Plan:  Right total hip when the patient chooses to go forward with it.     Chief Complaint: No chief complaint on file.      Physician:  Ceasar Orantes    HPI: Raul Oates is a 51 year old male who presents today for evaluation of his right hip.     Symptom Profile  Location of symptoms:  groin  Onset: insidious  Trend: getting worse   Duration of symptoms: >5 years   Quality of symptoms: aching, sharp/stabbing  Severity: severe  Alleviate:activity modification   Exacerbating: activities   Previous Treatments: Previous treatments include activity modification, oral pain medication    ACE Jr: 39.9    MEDICAL HISTORY:   Past Medical History:   Diagnosis Date     Epistaxis      Groin mass      Hip pain      Hypertension        Medications:     Current Outpatient Medications:      celecoxib (CELEBREX) 100 MG capsule, Take 1 capsule (100 mg) by mouth 2 times daily, Disp: 60 capsule,  "Rfl: 1     losartan (COZAAR) 25 MG tablet, Take 1 tablet (25 mg) by mouth 2 times daily, Disp: 180 tablet, Rfl: 0     meloxicam (MOBIC) 15 MG tablet, Take 1 tablet (15 mg) by mouth daily, Disp: 30 tablet, Rfl: 1     metFORMIN (GLUCOPHAGE) 500 MG tablet, TAKE 1 TABLET BY MOUTH TWICE A DAY WITH MEALS, Disp: 180 tablet, Rfl: 1     methocarbamol (ROBAXIN) 500 MG tablet, Take 1-2 tablets (500-1,000 mg) by mouth nightly as needed for muscle spasms, Disp: 60 tablet, Rfl: 0     methocarbamol (ROBAXIN) 750 MG tablet, Take 1 tablet (750 mg) by mouth nightly as needed for muscle spasms, Disp: 30 tablet, Rfl: 1     methylPREDNISolone (MEDROL DOSEPAK) 4 MG tablet therapy pack, Follow Package Directions, Disp: 21 tablet, Rfl: 0    Current Facility-Administered Medications:      lidocaine (PF) (XYLOCAINE) 1 % injection 4 mL, 4 mL, , , Ceasar Orantes MD, 4 mL at 08/21/20 1214     triamcinolone (KENALOG-40) injection 40 mg, 40 mg, , , Ceasar Orantes MD, 40 mg at 08/21/20 1214    Allergies: No known allergies    SURGICAL HISTORY:   Past Surgical History:   Procedure Laterality Date     ARTHROPLASTY HIP Left 10/12/2015    Procedure: ARTHROPLASTY HIP;  Surgeon: Hira Reilly MD;  Location: [facility] OR     ORTHOPEDIC SURGERY      right leg \"bone repair\"       FAMILY HISTORY:   Family History   Problem Relation Age of Onset     Unknown/Adopted Mother      Coronary Artery Disease Maternal Grandfather      Unknown/Adopted Father        SOCIAL HISTORY:   Social History     Tobacco Use     Smoking status: Never Smoker     Smokeless tobacco: Never Used   Substance Use Topics     Alcohol use: No     Alcohol/week: 0.0 standard drinks       REVIEW OF SYSTEMS:  The comprehensive review of systems from the intake form was reviewed with the patient.  No fever, weight change or fatigue. No dry eyes. No oral ulcers, sore throat or voice change. No palpitations, syncope, angina or edema.  No chest pain, excessive sleepiness, shortness " of breath or hemoptysis.   No abdominal pain, nausea, vomiting, diarrhea or heartburn.  No skin rash. No focal weakness or numbness. No bleeding or lymphadenopathy. No rhinitis or hives.     Exam:  On physical examination the patient appears the stated age, is in no acute distress, affectThe is appropriate, and breathing is non-labored.  Vitals are documented in the EMR and have been reviewed:    There were no vitals taken for this visit.  Data Unavailable  There is no height or weight on file to calculate BMI.    Rises from chair: easily   Gait: severe limp with minimal hip motion, stooped over to the right side  Trendelenburg test:  Gains the exam table: with effort     RIGHT hip subjective: irritated   Abd: -10  Add: 15  PFC:  Flexion: 40  IRF: -30  ERF:35  Impingement test: +    LEFT hip subjective: not irritated   Abd:  Add:  PFC:  Flexion: 95  IRF:0  ERF:25  Impingement test:    Distally, the circulatory, motor, and sensation exam is intact with 5/5 EHL, gastroc-soleus, and tibialis anterior.  Sensation to light touch is intact.  Dorsalis pedis and posterior tibialis pulses are palpable.  There are no sores on the feet, no bruising, and no lymphedema.    X-rays:   Severe right hip osteoarthritis with bone loss. Femoral head and superior acetabulum      Hira Reilly MD

## 2022-04-28 NOTE — PROGRESS NOTES
Assessment: This is a 51 year old now 7 years s/p left total hip doing well and with severe pain continuing in the right, native hip associated with severe, long-standing osteoarthritis. He is very painful and worried about not being able to perform his job duties any longer ([employer]). We discussed the diagnosis and the treatment options including living with it and total hip. We spent twenty minutes discussing total hip arthroplasty.  We discussed the implants, the procedure, the risks and benefits, and the post-operative course.  We discussed blood clots, blood clots to the lungs, injury to blood vessels and nerves, dislocation, infection, and leg length difference.  All the patients questions were answered to the best of my ability.    Plan:  Right total hip when the patient chooses to go forward with it.     Chief Complaint: No chief complaint on file.      Physician:  Ceasar Orantes    HPI: Raul Oates is a 51 year old male who presents today for evaluation of his right hip.     Symptom Profile  Location of symptoms:  groin  Onset: insidious  Trend: getting worse   Duration of symptoms: >5 years   Quality of symptoms: aching, sharp/stabbing  Severity: severe  Alleviate:activity modification   Exacerbating: activities   Previous Treatments: Previous treatments include activity modification, oral pain medication    ACE Jr: 39.9    MEDICAL HISTORY:   Past Medical History:   Diagnosis Date     Epistaxis      Groin mass      Hip pain      Hypertension        Medications:     Current Outpatient Medications:      celecoxib (CELEBREX) 100 MG capsule, Take 1 capsule (100 mg) by mouth 2 times daily, Disp: 60 capsule, Rfl: 1     losartan (COZAAR) 25 MG tablet, Take 1 tablet (25 mg) by mouth 2 times daily, Disp: 180 tablet, Rfl: 0     meloxicam (MOBIC) 15 MG tablet, Take 1 tablet (15 mg) by mouth daily, Disp: 30 tablet, Rfl: 1     metFORMIN (GLUCOPHAGE) 500 MG tablet, TAKE 1 TABLET BY  "MOUTH TWICE A DAY WITH MEALS, Disp: 180 tablet, Rfl: 1     methocarbamol (ROBAXIN) 500 MG tablet, Take 1-2 tablets (500-1,000 mg) by mouth nightly as needed for muscle spasms, Disp: 60 tablet, Rfl: 0     methocarbamol (ROBAXIN) 750 MG tablet, Take 1 tablet (750 mg) by mouth nightly as needed for muscle spasms, Disp: 30 tablet, Rfl: 1     methylPREDNISolone (MEDROL DOSEPAK) 4 MG tablet therapy pack, Follow Package Directions, Disp: 21 tablet, Rfl: 0    Current Facility-Administered Medications:      lidocaine (PF) (XYLOCAINE) 1 % injection 4 mL, 4 mL, , , Ceasar Orantes MD, 4 mL at 08/21/20 1214     triamcinolone (KENALOG-40) injection 40 mg, 40 mg, , , Ceasar Orantes MD, 40 mg at 08/21/20 1214    Allergies: No known allergies    SURGICAL HISTORY:   Past Surgical History:   Procedure Laterality Date     ARTHROPLASTY HIP Left 10/12/2015    Procedure: ARTHROPLASTY HIP;  Surgeon: Hira Reilly MD;  [facility]: [facility]     ORTHOPEDIC SURGERY      right leg \"bone repair\"       FAMILY HISTORY:   Family History   Problem Relation Age of Onset     Unknown/Adopted Mother      Coronary Artery Disease Maternal Grandfather      Unknown/Adopted Father        SOCIAL HISTORY:   Social History     Tobacco Use     Smoking status: Never Smoker     Smokeless tobacco: Never Used   Substance Use Topics     Alcohol use: No     Alcohol/week: 0.0 standard drinks       REVIEW OF SYSTEMS:  The comprehensive review of systems from the intake form was reviewed with the patient.  No fever, weight change or fatigue. No dry eyes. No oral ulcers, sore throat or voice change. No palpitations, syncope, angina or edema.  No chest pain, excessive sleepiness, shortness of breath or hemoptysis.   No abdominal pain, nausea, vomiting, diarrhea or heartburn.  No skin rash. No focal weakness or numbness. No bleeding or lymphadenopathy. No rhinitis or hives.     Exam:  On physical examination the patient appears the stated age, is in no acute " distress, affectThe is appropriate, and breathing is non-labored.  Vitals are documented in the EMR and have been reviewed:    There were no vitals taken for this visit.  Data Unavailable  There is no height or weight on file to calculate BMI.    Rises from chair: easily   Gait: severe limp with minimal hip motion, stooped over to the right side  Trendelenburg test:  Gains the exam table: with effort     RIGHT hip subjective: irritated   Abd: -10  Add: 15  PFC:  Flexion: 40  IRF: -30  ERF:35  Impingement test: +    LEFT hip subjective: not irritated   Abd:  Add:  PFC:  Flexion: 95  IRF:0  ERF:25  Impingement test:    Distally, the circulatory, motor, and sensation exam is intact with 5/5 EHL, gastroc-soleus, and tibialis anterior.  Sensation to light touch is intact.  Dorsalis pedis and posterior tibialis pulses are palpable.  There are no sores on the feet, no bruising, and no lymphedema.    X-rays:   Severe right hip osteoarthritis with bone loss. Femoral head and superior acetabulum

## 2022-04-28 NOTE — NURSING NOTE
Teaching Flowsheet   Relevant Diagnosis: R LILIAN  Teaching Topic: R LILIAN     RN Note: Pt is calm and cooperative, asking questions appropriately. Pt was instructed on pre-surgical packet requirements including H&P, COVID-19 test, NPO, and pre-surgical scrub. Pt verbalized understanding. Pt will schedule H&P c PCP, COVID test 3-4 days before surgery, scrub, [place] packet, and packet given to pt. Pt would like to establish care with a PCP at the [facility] as his previous NP has left.    Person(s) involved in teaching:   Patient     Motivation Level:  Asks Questions: Yes  Eager to Learn: Yes  Cooperative: Yes  Receptive (willing/able to accept information): Yes  Any cultural factors/Shinto beliefs that may influence understanding or compliance? No     Patient demonstrates understanding of the following:  Reason for the appointment, diagnosis and treatment plan: Yes  Knowledge of proper use of medications and conditions for which they are ordered (with special attention to potential side effects or drug interactions): Yes  Which situations necessitate calling provider and whom to contact: Yes    Proper use and care of (medical equip, care aids, etc.): Yes  Nutritional needs and diet plan: Yes  Pain management techniques: Yes  Wound Care: Yes  How and/when to access community resources: Yes    Isma Khan, RNCC

## 2022-04-29 ENCOUNTER — TELEPHONE (OUTPATIENT)
Dept: ORTHOPEDICS | Facility: CLINIC | Age: 52
End: 2022-04-29
Payer: COMMERCIAL

## 2022-04-29 NOTE — TELEPHONE ENCOUNTER
Called and left voicemail for patient about scheduling surgery with Dr. Reilly. Gave [phone] as call back number.

## 2022-12-25 ENCOUNTER — HEALTH MAINTENANCE LETTER (OUTPATIENT)
Age: 52
End: 2022-12-25

## 2023-01-11 ENCOUNTER — LAB (OUTPATIENT)
Dept: LAB | Facility: CLINIC | Age: 53
End: 2023-01-11
Payer: COMMERCIAL

## 2023-01-11 ENCOUNTER — OFFICE VISIT (OUTPATIENT)
Dept: FAMILY MEDICINE | Facility: CLINIC | Age: 53
End: 2023-01-11
Payer: COMMERCIAL

## 2023-01-11 VITALS
OXYGEN SATURATION: 96 % | SYSTOLIC BLOOD PRESSURE: 165 MMHG | HEART RATE: 72 BPM | DIASTOLIC BLOOD PRESSURE: 129 MMHG | WEIGHT: 232 LBS | BODY MASS INDEX: 37.45 KG/M2 | TEMPERATURE: 98.5 F | RESPIRATION RATE: 18 BRPM

## 2023-01-11 DIAGNOSIS — R74.8 ELEVATED ALKALINE PHOSPHATASE LEVEL: ICD-10-CM

## 2023-01-11 DIAGNOSIS — R31.9 HEMATURIA, UNSPECIFIED TYPE: ICD-10-CM

## 2023-01-11 DIAGNOSIS — M16.11 PRIMARY OSTEOARTHRITIS OF RIGHT HIP: Primary | ICD-10-CM

## 2023-01-11 DIAGNOSIS — I10 HYPERTENSION, UNSPECIFIED TYPE: ICD-10-CM

## 2023-01-11 DIAGNOSIS — R07.89 FEELING OF CHEST TIGHTNESS: ICD-10-CM

## 2023-01-11 DIAGNOSIS — R73.03 PRE-DIABETES: ICD-10-CM

## 2023-01-11 DIAGNOSIS — I49.1: ICD-10-CM

## 2023-01-11 LAB
ALBUMIN SERPL BCG-MCNC: 4.5 G/DL (ref 3.5–5.2)
ALBUMIN UR-MCNC: 30 MG/DL
ALP SERPL-CCNC: 134 U/L (ref 40–129)
ALT SERPL W P-5'-P-CCNC: 15 U/L (ref 10–50)
ANION GAP SERPL CALCULATED.3IONS-SCNC: 8 MMOL/L (ref 7–15)
APPEARANCE UR: ABNORMAL
AST SERPL W P-5'-P-CCNC: 22 U/L (ref 10–50)
BILIRUB SERPL-MCNC: 0.6 MG/DL
BILIRUB UR QL STRIP: NEGATIVE
BUN SERPL-MCNC: 15.1 MG/DL (ref 6–20)
CALCIUM SERPL-MCNC: 9.9 MG/DL (ref 8.6–10)
CHLORIDE SERPL-SCNC: 103 MMOL/L (ref 98–107)
COLOR UR AUTO: YELLOW
CREAT SERPL-MCNC: 0.8 MG/DL (ref 0.67–1.17)
DEPRECATED HCO3 PLAS-SCNC: 30 MMOL/L (ref 22–29)
ERYTHROCYTE [DISTWIDTH] IN BLOOD BY AUTOMATED COUNT: 14.1 % (ref 10–15)
GFR SERPL CREATININE-BSD FRML MDRD: >90 ML/MIN/1.73M2
GLUCOSE SERPL-MCNC: 105 MG/DL (ref 70–99)
GLUCOSE UR STRIP-MCNC: NEGATIVE MG/DL
HBA1C MFR BLD: 6.4 %
HCT VFR BLD AUTO: 46 % (ref 40–53)
HGB BLD-MCNC: 15.2 G/DL (ref 13.3–17.7)
HGB UR QL STRIP: ABNORMAL
KETONES UR STRIP-MCNC: NEGATIVE MG/DL
LEUKOCYTE ESTERASE UR QL STRIP: ABNORMAL
MCH RBC QN AUTO: 28.6 PG (ref 26.5–33)
MCHC RBC AUTO-ENTMCNC: 33 G/DL (ref 31.5–36.5)
MCV RBC AUTO: 87 FL (ref 78–100)
MUCOUS THREADS #/AREA URNS LPF: PRESENT /LPF
NITRATE UR QL: NEGATIVE
PH UR STRIP: 6 [PH] (ref 5–7)
PLATELET # BLD AUTO: 245 10E3/UL (ref 150–450)
POTASSIUM SERPL-SCNC: 5.1 MMOL/L (ref 3.4–5.3)
PROT SERPL-MCNC: 7.7 G/DL (ref 6.4–8.3)
PSA SERPL-MCNC: 3.03 NG/ML (ref 0–3.5)
RBC # BLD AUTO: 5.31 10E6/UL (ref 4.4–5.9)
RBC URINE: 24 /HPF
SODIUM SERPL-SCNC: 141 MMOL/L (ref 136–145)
SP GR UR STRIP: 1.02 (ref 1–1.03)
SQUAMOUS EPITHELIAL: 3 /HPF
UROBILINOGEN UR STRIP-MCNC: NORMAL MG/DL
WBC # BLD AUTO: 8.5 10E3/UL (ref 4–11)
WBC CLUMPS #/AREA URNS HPF: PRESENT /HPF
WBC URINE: >182 /HPF

## 2023-01-11 PROCEDURE — 83036 HEMOGLOBIN GLYCOSYLATED A1C: CPT | Mod: 90 | Performed by: PATHOLOGY

## 2023-01-11 PROCEDURE — G0103 PSA SCREENING: HCPCS | Performed by: PATHOLOGY

## 2023-01-11 PROCEDURE — 82977 ASSAY OF GGT: CPT | Performed by: PATHOLOGY

## 2023-01-11 PROCEDURE — 93000 ELECTROCARDIOGRAM COMPLETE: CPT | Performed by: INTERNAL MEDICINE

## 2023-01-11 PROCEDURE — 87086 URINE CULTURE/COLONY COUNT: CPT | Mod: 90 | Performed by: PATHOLOGY

## 2023-01-11 PROCEDURE — 81001 URINALYSIS AUTO W/SCOPE: CPT | Performed by: PATHOLOGY

## 2023-01-11 PROCEDURE — 85027 COMPLETE CBC AUTOMATED: CPT | Performed by: PATHOLOGY

## 2023-01-11 PROCEDURE — 99000 SPECIMEN HANDLING OFFICE-LAB: CPT | Performed by: PATHOLOGY

## 2023-01-11 PROCEDURE — 99215 OFFICE O/P EST HI 40 MIN: CPT | Performed by: NURSE PRACTITIONER

## 2023-01-11 PROCEDURE — 36415 COLL VENOUS BLD VENIPUNCTURE: CPT | Performed by: PATHOLOGY

## 2023-01-11 PROCEDURE — 80053 COMPREHEN METABOLIC PANEL: CPT | Performed by: PATHOLOGY

## 2023-01-11 RX ORDER — HYDROCHLOROTHIAZIDE 12.5 MG/1
12.5 TABLET ORAL DAILY
Qty: 30 TABLET | Refills: 1 | Status: SHIPPED | OUTPATIENT
Start: 2023-01-11 | End: 2023-01-11

## 2023-01-11 RX ORDER — AMLODIPINE BESYLATE 5 MG/1
5 TABLET ORAL DAILY
Qty: 30 TABLET | Refills: 1 | Status: SHIPPED | OUTPATIENT
Start: 2023-01-11 | End: 2023-03-03

## 2023-01-11 RX ORDER — LOSARTAN POTASSIUM 25 MG/1
25 TABLET ORAL 2 TIMES DAILY
Qty: 180 TABLET | Refills: 0 | Status: SHIPPED | OUTPATIENT
Start: 2023-01-11 | End: 2023-03-03

## 2023-01-11 ASSESSMENT — ANXIETY QUESTIONNAIRES
5. BEING SO RESTLESS THAT IT IS HARD TO SIT STILL: SEVERAL DAYS
GAD7 TOTAL SCORE: 9
GAD7 TOTAL SCORE: 9
IF YOU CHECKED OFF ANY PROBLEMS ON THIS QUESTIONNAIRE, HOW DIFFICULT HAVE THESE PROBLEMS MADE IT FOR YOU TO DO YOUR WORK, TAKE CARE OF THINGS AT HOME, OR GET ALONG WITH OTHER PEOPLE: SOMEWHAT DIFFICULT
1. FEELING NERVOUS, ANXIOUS, OR ON EDGE: NOT AT ALL
6. BECOMING EASILY ANNOYED OR IRRITABLE: SEVERAL DAYS
3. WORRYING TOO MUCH ABOUT DIFFERENT THINGS: NEARLY EVERY DAY
2. NOT BEING ABLE TO STOP OR CONTROL WORRYING: SEVERAL DAYS
7. FEELING AFRAID AS IF SOMETHING AWFUL MIGHT HAPPEN: NEARLY EVERY DAY

## 2023-01-11 ASSESSMENT — PAIN SCALES - GENERAL: PAINLEVEL: SEVERE PAIN (7)

## 2023-01-11 ASSESSMENT — PATIENT HEALTH QUESTIONNAIRE - PHQ9
5. POOR APPETITE OR OVEREATING: NOT AT ALL
SUM OF ALL RESPONSES TO PHQ QUESTIONS 1-9: 6

## 2023-01-11 NOTE — LETTER
Patient:  Raul Oates  :   1970  MRN:     [id number]        Mr. Raul Oates   [address] [facility] [address]      Re: Raul Oates     To Whom This May Concern:    Raul Oates is a patient of the [facility] system. They were personally evaluated by me on 2023 .  The patient has a history of severe, long-standing osteoarthritis of his Right hip and was advised by an Orthopedic Surgeon, Dr. Reilly, to undergo surgical replacement in 2022. At this time, he has not replaced his right hip. During my exam today, the patient demonstrated a pronounced antalgic gait. Additionally, his physical exam of the Right hip was limited by guarding secondary to the pain.  In light of this, it is in my professional opinion that every effort should be made to limit the patient's ambulation/climbing of ladders given his underlying Right hip osteoarthritis. He should not be lifting items over 20lbs in the weight.       If you have any questions or concerns, please do not hesitate to contact my office.    Sincerely,    BOBBY Sexton, CNP  [facility] [employer]

## 2023-01-11 NOTE — PROGRESS NOTES
Today's Date: Jan 11, 2023     Patient Raul Oates 1970 presents to the clinic today to address Right hip pain.         SUBJECTIVE     History of Present Illness:    52-year-old male with PMH HTN, Pre-diabetes, hematuria, elevated alk phos, bilateral Hip OA (S/p L hip replacement 2015) presents to discuss R hip pain. Patient has had R hip pain for approx.  8-10 years. He had an evaluation with orthopedic surgery in April of 2022, they advised R hip replacement at that time given his severe osteoarthritis and symptoms. Patient works in the [employer] at the [employer] and endorses difficulty walking (frandy. Upstairs) and climbing ladders. He would like a note for work outlining physical limitations.       HTN/Chest tightness- Today, patient endorses intermittent chest tightness,approx. 1-2x /month.  Sometimes the chest tightness comes with physical exertion, sometmes at rest. The chest tightness will resolve spontaneously. He endorses some sweating, though he reports that he and his daughter perspire quite often. He denies chest pain at time of exam. He denies FHX of CVD <55.  He denies associated SOB, or BLE edema.    Hematuria- Patient a history of hematuria, he was advised to have a cysto and CT urogram during a virtual appointment with Urology in 11/2020, however, these have not been completed. He reports that he has not had hematuria in 9 months. He denies FHX prostate cancer. No other acute concerns/symptoms at time of exam.    Review of Systems   Constitutional, HEENT, cardiovascular, pulmonary, gi and gu systems are negative, except as otherwise noted.      Allergies   Allergen Reactions     No Known Allergies         Current Outpatient Medications   Medication Instructions     celecoxib (CELEBREX) 100 mg, Oral, 2 TIMES DAILY     losartan (COZAAR) 25 mg, Oral, 2 TIMES DAILY     meloxicam (MOBIC) 15 mg, Oral, DAILY     metFORMIN (GLUCOPHAGE) 500 MG tablet TAKE 1 TABLET BY MOUTH TWICE A DAY WITH MEALS      methocarbamol (ROBAXIN) 500-1,000 mg, Oral, AT BEDTIME PRN     methocarbamol (ROBAXIN) 750 mg, Oral, AT BEDTIME PRN     methylPREDNISolone (MEDROL DOSEPAK) 4 MG tablet therapy pack Follow Package Directions       Past Medical History:   Diagnosis Date     Epistaxis      Groin mass      Hip pain      Hypertension         Family History   Problem Relation Age of Onset     Unknown/Adopted Mother      Coronary Artery Disease Maternal Grandfather      Unknown/Adopted Father         Social History     Tobacco Use     Smoking status: Never     Smokeless tobacco: Never   Substance Use Topics     Alcohol use: No     Alcohol/week: 0.0 standard drinks     Drug use: No        History   Sexual Activity     Sexual activity: Not Currently        PHQ 10/14/2020   PHQ-9 Total Score 9   Q9: Thoughts of better off dead/self-harm past 2 weeks Not at all        Immunization History   Administered Date(s) Administered     COVID-19 Vaccine 18+ (Moderna) 12/21/2021     Influenza Vaccine >6 months (Alfuria,Fluzone) 10/14/2020                 OBJECTIVE     BP (!) 165/129 (BP Location: Right arm, Patient Position: Sitting, Cuff Size: Adult Large)   Pulse 72   Temp 98.5  F (36.9  C) (Oral)   Resp 18   Wt 105.2 kg (232 lb)   SpO2 96%   BMI 37.45 kg/m        Labs:  Lab Results   Component Value Date    WBC 7.9 06/02/2021    HGB 15.6 06/02/2021    HCT 47.0 06/02/2021     06/02/2021    CHOL 208 (H) 10/12/2020    TRIG 62 10/12/2020    HDL 71 10/12/2020    ALT 48 10/12/2020    AST 32 10/12/2020     10/12/2020    BUN 12 10/12/2020    CO2 28 10/12/2020    PSA 1.68 10/16/2020    INR 0.99 03/02/2020        Physical Exam  Constitutional:       General: He is not in acute distress.     Appearance: He is diaphoretic. He is not ill-appearing.   Cardiovascular:      Rate and Rhythm: Normal rate and regular rhythm.      Heart sounds: No murmur heard.  Pulmonary:      Effort: Pulmonary effort is normal. No respiratory distress.       Breath sounds: Normal breath sounds. No wheezing, rhonchi or rales.   Musculoskeletal:      Cervical back: Neck supple.      Right hip: Tenderness present. Decreased range of motion.      Right lower leg: No edema.      Left lower leg: No edema.      Comments: Severe antalgic gait noted.  Physical exam (R hip flexion/extension and internal/exerternal rotation) limited by guarding.   Lymphadenopathy:      Cervical: No cervical adenopathy.   Neurological:      General: No focal deficit present.      Mental Status: He is alert.   Psychiatric:         Thought Content: Thought content normal.         Judgment: Judgment normal.         EKG: Sinus rhythm with premature supraventricular complexes, otherwise normal EKG. No ST changes c/w ischemia noted.    Recent Results (from the past 6 hour(s))   EKG 12-lead complete w/read - Clinics    Collection Time: 01/11/23  4:06 PM   Result Value Ref Range    Systolic Blood Pressure  mmHg    Diastolic Blood Pressure  mmHg    Ventricular Rate 80 BPM    Atrial Rate 80 BPM    UT Interval 148 ms    QRS Duration 78 ms     ms    QTc 419 ms    P Axis 15 degrees    R AXIS -28 degrees    T Axis 37 degrees    Interpretation ECG       Sinus rhythm with Premature supraventricular complexes  Otherwise normal ECG  When compared with ECG of 06-OCT-2015 16:19,  Premature supraventricular complexes are now Present     CBC with platelets    Collection Time: 01/11/23  4:40 PM   Result Value Ref Range    WBC Count 8.5 4.0 - 11.0 10e3/uL    RBC Count 5.31 4.40 - 5.90 10e6/uL    Hemoglobin 15.2 13.3 - 17.7 g/dL    Hematocrit 46.0 40.0 - 53.0 %    MCV 87 78 - 100 fL    MCH 28.6 26.5 - 33.0 pg    MCHC 33.0 31.5 - 36.5 g/dL    RDW 14.1 10.0 - 15.0 %    Platelet Count 245 150 - 450 10e3/uL   PSA, screen    Collection Time: 01/11/23  4:40 PM   Result Value Ref Range    Prostate Specific Antigen Screen 3.03 0.00 - 3.50 ng/mL   Comprehensive metabolic panel (BMP + Alb, Alk Phos, ALT, AST, Total. Bili, TP)     Collection Time: 01/11/23  4:40 PM   Result Value Ref Range    Sodium 141 136 - 145 mmol/L    Potassium 5.1 3.4 - 5.3 mmol/L    Chloride 103 98 - 107 mmol/L    Carbon Dioxide (CO2) 30 (H) 22 - 29 mmol/L    Anion Gap 8 7 - 15 mmol/L    Urea Nitrogen 15.1 6.0 - 20.0 mg/dL    Creatinine 0.80 0.67 - 1.17 mg/dL    Calcium 9.9 8.6 - 10.0 mg/dL    Glucose 105 (H) 70 - 99 mg/dL    Alkaline Phosphatase 134 (H) 40 - 129 U/L    AST 22 10 - 50 U/L    ALT 15 10 - 50 U/L    Protein Total 7.7 6.4 - 8.3 g/dL    Albumin 4.5 3.5 - 5.2 g/dL    Bilirubin Total 0.6 <=1.2 mg/dL    GFR Estimate >90 >60 mL/min/1.73m2   Hemoglobin A1c    Collection Time: 01/11/23  4:40 PM   Result Value Ref Range    Hemoglobin A1C 6.4 (H) <5.7 %   UA Macro with Reflex to Micro and Culture - lab collect    Collection Time: 01/11/23  4:44 PM    Specimen: Urine, Midstream   Result Value Ref Range    Color Urine Yellow Colorless, Straw, Light Yellow, Yellow    Appearance Urine Slightly Cloudy (A) Clear    Glucose Urine Negative Negative mg/dL    Bilirubin Urine Negative Negative    Ketones Urine Negative Negative mg/dL    Specific Gravity Urine 1.025 1.003 - 1.035    Blood Urine Small (A) Negative    pH Urine 6.0 5.0 - 7.0    Protein Albumin Urine 30 (A) Negative mg/dL    Urobilinogen Urine Normal Normal, 2.0 mg/dL    Nitrite Urine Negative Negative    Leukocyte Esterase Urine Large (A) Negative    WBC Clumps Urine Present (A) None Seen /HPF    Mucus Urine Present (A) None Seen /LPF    RBC Urine 24 (H) <=2 /HPF    WBC Urine >182 (H) <=5 /HPF    Squamous Epithelials Urine 3 (H) <=1 /HPF              ASSESSMENT/PLAN     1. Primary osteoarthritis of right hip  Letter provided to patient for work. Advised him to reconsider R hip replacement considering pain/gait abnormalities.    2. Hypertension, unspecified type  BP not at goal, will add amlodipine to BP regimen. EKG sinus with premature supraventricular complexes, no ST changes c/w ischemia noted. Will check  Holter and Stress ECHO considering his c/o of chest tightness. ER precautions discussed (CP with/without diaphoresis, SOB, etc.)  - EKG 12-lead complete w/read - Clinics  - Adult Leadless EKG Monitor 3 to 7 Days; Future  - Echocardiogram Exercise Stress; Future  - losartan (COZAAR) 25 MG tablet; Take 1 tablet (25 mg) by mouth 2 times daily  Dispense: 180 tablet; Refill: 0  - amLODIPine (NORVASC) 5 MG tablet; Take 1 tablet (5 mg) by mouth daily  Dispense: 30 tablet; Refill: 1    3. Feeling of chest tightness  As noted above.  - EKG 12-lead complete w/read - Clinics  - Adult Leadless EKG Monitor 3 to 7 Days; Future  - Echocardiogram Exercise Stress; Future  - CBC with platelets; Future    4. Premature supraventricular beat  As noted above.  - Adult Leadless EKG Monitor 3 to 7 Days; Future  - Echocardiogram Exercise Stress; Future    5. Pre-diabetes  A1C 6.4%, continue moderating carbohydrates.  - Hemoglobin A1c; Future  - Comprehensive metabolic panel (BMP + Alb, Alk Phos, ALT, AST, Total. Bili, TP); Future    6. Hematuria, unspecified type  PSA 3.03, up from 1.68 in 10/2020. UA with blood- awaiting culture results. Will refer back patient to Urology for further eval.  - UA Macro with Reflex to Micro and Culture - lab collect; Future  - PSA, screen; Future  - Adult Urology  Referral; Future        Follow-Up:  - Follow up in 4 week(s) for BP evaluation, or sooner if symptoms worsen or fail to improve.     Options for treatment and follow-up care were reviewed with the patient. Patient engaged in the decision making process and verbalized understanding of the options discussed and agreed with the final plan.  AVS printed and given to patient.    BOBBY Sexton CNP    [facility]  [address] [address] [phone]

## 2023-01-12 LAB
ATRIAL RATE - MUSE: 80 BPM
DIASTOLIC BLOOD PRESSURE - MUSE: NORMAL MMHG
GGT SERPL-CCNC: 27 U/L (ref 8–61)
INTERPRETATION ECG - MUSE: NORMAL
P AXIS - MUSE: 15 DEGREES
PR INTERVAL - MUSE: 148 MS
QRS DURATION - MUSE: 78 MS
QT - MUSE: 364 MS
QTC - MUSE: 419 MS
R AXIS - MUSE: -28 DEGREES
SYSTOLIC BLOOD PRESSURE - MUSE: NORMAL MMHG
T AXIS - MUSE: 37 DEGREES
VENTRICULAR RATE- MUSE: 80 BPM

## 2023-01-13 LAB — BACTERIA UR CULT: NORMAL

## 2023-01-30 ENCOUNTER — TELEPHONE (OUTPATIENT)
Dept: FAMILY MEDICINE | Facility: CLINIC | Age: 53
End: 2023-01-30
Payer: COMMERCIAL

## 2023-01-30 NOTE — CONFIDENTIAL NOTE
Called to discuss lab results/check-in, LVILA.    BOBBY Sexton, CNP  [facility] [employer]

## 2023-03-01 ENCOUNTER — TELEPHONE (OUTPATIENT)
Dept: FAMILY MEDICINE | Facility: CLINIC | Age: 53
End: 2023-03-01
Payer: COMMERCIAL

## 2023-03-01 NOTE — TELEPHONE ENCOUNTER
Patient called to get his letter revised that provider wrote for his letter for work, he would like it to say no more than 40 lbs instead of 20 lbs because his work won't let him work without a letter that states that he can  40 pounds.

## 2023-03-03 ENCOUNTER — OFFICE VISIT (OUTPATIENT)
Dept: FAMILY MEDICINE | Facility: CLINIC | Age: 53
End: 2023-03-03
Payer: COMMERCIAL

## 2023-03-03 VITALS
BODY MASS INDEX: 34.57 KG/M2 | DIASTOLIC BLOOD PRESSURE: 93 MMHG | TEMPERATURE: 98.7 F | RESPIRATION RATE: 18 BRPM | HEART RATE: 91 BPM | OXYGEN SATURATION: 97 % | SYSTOLIC BLOOD PRESSURE: 164 MMHG | WEIGHT: 214.2 LBS

## 2023-03-03 DIAGNOSIS — R31.9 HEMATURIA, UNSPECIFIED TYPE: ICD-10-CM

## 2023-03-03 DIAGNOSIS — M16.11 PRIMARY OSTEOARTHRITIS OF RIGHT HIP: ICD-10-CM

## 2023-03-03 DIAGNOSIS — I10 HYPERTENSION, UNSPECIFIED TYPE: Primary | ICD-10-CM

## 2023-03-03 RX ORDER — AMLODIPINE BESYLATE 5 MG/1
5 TABLET ORAL DAILY
Qty: 30 TABLET | Refills: 1 | Status: SHIPPED | OUTPATIENT
Start: 2023-03-03 | End: 2023-10-27

## 2023-03-03 RX ORDER — LOSARTAN POTASSIUM 25 MG/1
25 TABLET ORAL 2 TIMES DAILY
Qty: 180 TABLET | Refills: 0 | Status: SHIPPED | OUTPATIENT
Start: 2023-03-03 | End: 2023-10-27

## 2023-03-03 ASSESSMENT — PAIN SCALES - GENERAL: PAINLEVEL: MODERATE PAIN (5)

## 2023-03-03 NOTE — NURSING NOTE
ROOM:  NATAL[facility]    Preferred Name: Raul     How did you hear about us?  Current Patient    52 year old  Chief Complaint   Patient presents with     Forms     Work letter       Blood pressure (!) 164/93, pulse 91, temperature 98.7  F (37.1  C), temperature source Oral, resp. rate 18, weight 97.2 kg (214 lb 3.2 oz), SpO2 97 %. Body mass index is 34.57 kg/m .  BP completed using cuff size:        Patient Active Problem List   Diagnosis     Hyperlipidemia LDL goal <160     History of total hip arthroplasty, left     Essential hypertension, benign     Hypertension     Morbid obesity (H)       Wt Readings from Last 2 Encounters:   03/03/23 97.2 kg (214 lb 3.2 oz)   01/11/23 105.2 kg (232 lb)     BP Readings from Last 3 Encounters:   03/03/23 (!) 164/93   01/11/23 (!) 165/129   06/02/21 134/88       Allergies   Allergen Reactions     No Known Allergies        Current Outpatient Medications   Medication     amLODIPine (NORVASC) 5 MG tablet     losartan (COZAAR) 25 MG tablet     celecoxib (CELEBREX) 100 MG capsule     metFORMIN (GLUCOPHAGE) 500 MG tablet     methocarbamol (ROBAXIN) 500 MG tablet     methocarbamol (ROBAXIN) 750 MG tablet     Current Facility-Administered Medications   Medication     lidocaine (PF) (XYLOCAINE) 1 % injection 4 mL     triamcinolone (KENALOG-40) injection 40 mg       Social History     Tobacco Use     Smoking status: Never     Smokeless tobacco: Never   Substance Use Topics     Alcohol use: No     Alcohol/week: 0.0 standard drinks     Drug use: No       Honoring Choices - Health Care Directive Guide offered to patient at time of visit.    Health Maintenance Due   Topic Date Due     YEARLY PREVENTIVE VISIT  Never done     ADVANCE CARE PLANNING  Never done     HEPATITIS B IMMUNIZATION (1 of 3 - 3-dose series) Never done     Pneumococcal Vaccine: Pediatrics (0 to 5 Years) and At-Risk Patients (6 to 64 Years) (1 - PCV) Never done     COLORECTAL CANCER SCREENING  Never done     HIV  SCREENING  Never done     HEPATITIS C SCREENING  Never done     DTAP/TDAP/TD IMMUNIZATION (1 - Tdap) Never done     ZOSTER IMMUNIZATION (1 of 2) Never done     INFLUENZA VACCINE (1) 09/01/2022       Immunization History   Administered Date(s) Administered     COVID-19 Vaccine 18+ (Moderna) 12/21/2021     Influenza Vaccine >6 months (Alfuria,Fluzone) 10/14/2020       No results found for: PAP    Recent Labs   Lab Test 01/11/23  1640 06/02/21  1333 10/12/20  1405 03/02/20  1627   A1C 6.4* 5.8* 5.7*  --    LDL  --   --  124*  --    HDL  --   --  71  --    TRIG  --   --  62  --    ALT 15  --  48 26   CR 0.80  --  0.78 0.60*   GFRESTIMATED >90  --  >90 >90   GFRESTBLACK  --   --  >90 >90   ALBUMIN 4.5  --  3.7 4.1   POTASSIUM 5.1  --  4.6 4.3       PHQ-2 ( 1999 Pfizer) 3/3/2023 1/11/2023   Q1: Little interest or pleasure in doing things 0 0   Q2: Feeling down, depressed or hopeless 0 1   PHQ-2 Score 0 1   PHQ-2 Total Score (12-17 Years)- Positive if 3 or more points; Administer PHQ-A if positive - -       PHQ-9 SCORE 10/14/2020 1/11/2023   PHQ-9 Total Score 9 6       ELBERT-7 SCORE 10/14/2020 1/11/2023   Total Score 10 9       No flowsheet data found.    Kim Neal, EMT    March 3, 2023 1:45 PM

## 2023-03-03 NOTE — PROGRESS NOTES
Today's Date: Mar 3, 2023     Patient Raul Oates 1970 presents to the clinic today to address   Chief Complaint   Patient presents with     Forms     Work letter             SUBJECTIVE     History of Present Illness:    52-year-old male with PMH HTN, Pre-diabetes, hematuria, elevated alk phos, bilateral Hip OA (S/p L hip replacement 2015) presents to discuss work restrictions. During 1/11/23 encounter, the patient was placed on a work restriction of lifting no more than 20lbs due to R hip pain and difficulty walking/climing ladders. Today, patient reports that he has been lifting up to 40lbs and would like to update his work letter to reflect as such. He still has R hip pain and ambulation difficulties but feels that he is able to safely lift up to 40lbs despite his R hip limitations.    HTN- Amlodipine was added to BP regimen in during 1/11/23 visit. He had an Stress ECHO and Holter monitor ordered due to complaints of chest tightness, however, these studies were not completed. Chest tightness has resolved. He denies CP, palpitations, or SOB today. He forgot to take his BP medications today. He admits to not taking his medications frequently.    Hematuria- Patient PSA 3.03 in January of 2023. He was referred back to Urology given his hematuria. He has not made an appointment with Urology to date. No other acute concerns/symptoms at time of exam.    Review of Systems   Constitutional, HEENT, cardiovascular, pulmonary, gi and gu systems are negative, except as otherwise noted.          Allergies   Allergen Reactions     No Known Allergies         Current Outpatient Medications   Medication Instructions     amLODIPine (NORVASC) 5 mg, Oral, DAILY     celecoxib (CELEBREX) 100 mg, Oral, 2 TIMES DAILY     losartan (COZAAR) 25 mg, Oral, 2 TIMES DAILY     metFORMIN (GLUCOPHAGE) 500 MG tablet TAKE 1 TABLET BY MOUTH TWICE A DAY WITH MEALS     methocarbamol (ROBAXIN) 500-1,000 mg, Oral, AT BEDTIME PRN      methocarbamol (ROBAXIN) 750 mg, Oral, AT BEDTIME PRN       Past Medical History:   Diagnosis Date     Epistaxis      Groin mass      Hip pain      Hypertension         Family History   Problem Relation Age of Onset     Unknown/Adopted Mother      Coronary Artery Disease Maternal Grandfather      Unknown/Adopted Father         Social History     Tobacco Use     Smoking status: Never     Smokeless tobacco: Never   Substance Use Topics     Alcohol use: No     Alcohol/week: 0.0 standard drinks     Drug use: No        History   Sexual Activity     Sexual activity: Not Currently        PHQ 10/14/2020 1/11/2023   PHQ-9 Total Score 9 6   Q9: Thoughts of better off dead/self-harm past 2 weeks Not at all Not at all        Immunization History   Administered Date(s) Administered     COVID-19 Vaccine 18+ (Moderna) 12/21/2021     Influenza Vaccine >6 months (Alfuria,Fluzone) 10/14/2020               OBJECTIVE     BP (!) 164/93 (BP Location: Left arm, Patient Position: Sitting, Cuff Size: Adult Large)   Pulse 91   Temp 98.7  F (37.1  C) (Oral)   Resp 18   Wt 97.2 kg (214 lb 3.2 oz)   SpO2 97%   BMI 34.57 kg/m       Labs:  Lab Results   Component Value Date    WBC 8.5 01/11/2023    HGB 15.2 01/11/2023    HCT 46.0 01/11/2023     01/11/2023    CHOL 208 (H) 10/12/2020    TRIG 62 10/12/2020    HDL 71 10/12/2020    ALT 15 01/11/2023    AST 22 01/11/2023     01/11/2023    BUN 15.1 01/11/2023    CO2 30 (H) 01/11/2023    PSA 3.03 01/11/2023    INR 0.99 03/02/2020        Physical Exam  Constitutional:       General: He is not in acute distress.     Appearance: He is not ill-appearing.   Cardiovascular:      Rate and Rhythm: Normal rate.   Pulmonary:      Effort: Pulmonary effort is normal. No respiratory distress.   Musculoskeletal:      Cervical back: Neck supple.   Neurological:      General: No focal deficit present.      Mental Status: He is alert.      Gait: Gait abnormal.   Psychiatric:         Thought Content:  Thought content normal.         Judgment: Judgment normal.               ASSESSMENT/PLAN     1. Hypertension, unspecified type  Pt currently nonadherent to his BP regimen. Instructed to restart his BP medications and to monitor his BP at home starting about a week after he restarts his medications. He agreed to call our clinic in 2-3 weeks with updated home BP values.  - Home Blood Pressure Monitor Order for DME - ONLY FOR DME  - amLODIPine (NORVASC) 5 MG tablet; Take 1 tablet (5 mg) by mouth daily  Dispense: 30 tablet; Refill: 1  - losartan (COZAAR) 25 MG tablet; Take 1 tablet (25 mg) by mouth 2 times daily  Dispense: 180 tablet; Refill: 0    2. Primary osteoarthritis of right hip  Updated work letter for patient. Advised follow-up with Dr. Reilly.    3. Hematuria, unspecified type  Instructed patient to make follow-up with Urology. Pt stated understanding/agreement.    Follow-Up:  - Follow up in 3 month(s), or if symptoms worsen or fail to improve.     Options for treatment and follow-up care were reviewed with the patient. Patient engaged in the decision making process and verbalized understanding of the options discussed and agreed with the final plan.  AVS printed and given to patient.    BOBBY Sexton CNP    [facility]  [address] [place] [id number] [phone]

## 2023-03-03 NOTE — LETTER
Patient:  Raul Oates  :   1970  MRN:     [id number]        Mr. Raul Oates  [address] [facility] [id number]        Re: Raul Oates      To Whom This May Concern:     Raul Oates is a patient of the [facility] system. They were personally evaluated by me on 2023 and March 3, 2023.  The patient has a history of severe, long-standing osteoarthritis of his Right hip and was advised by an Orthopedic Surgeon, Dr. Reilly, to undergo surgical replacement in 2022. At this time, he has not replaced his right hip.     On 2023, the patient was previously advised light duty with lifting items no heavier than 20lbs. He reports on March 3, 2023 that he is able to lift 40lbs. Since he still has right hip pain and difficulty with ambulation/climbing ladders, every effort should be made to limit these activities for the patient. He should not be lifting items over 40lbs in the weight.         If you have any questions or concerns, please do not hesitate to contact my office.     Sincerely,       BOBBY Sexton, CNP  [employer]

## 2023-04-16 ENCOUNTER — HEALTH MAINTENANCE LETTER (OUTPATIENT)
Age: 53
End: 2023-04-16

## 2023-06-27 ENCOUNTER — OFFICE VISIT (OUTPATIENT)
Dept: FAMILY MEDICINE | Facility: CLINIC | Age: 53
End: 2023-06-27
Payer: COMMERCIAL

## 2023-06-27 VITALS
OXYGEN SATURATION: 95 % | DIASTOLIC BLOOD PRESSURE: 95 MMHG | WEIGHT: 235 LBS | BODY MASS INDEX: 39.15 KG/M2 | SYSTOLIC BLOOD PRESSURE: 157 MMHG | HEIGHT: 65 IN | HEART RATE: 91 BPM | TEMPERATURE: 98.6 F

## 2023-06-27 DIAGNOSIS — I10 BENIGN ESSENTIAL HYPERTENSION: ICD-10-CM

## 2023-06-27 DIAGNOSIS — M25.561 ACUTE PAIN OF RIGHT KNEE: Primary | ICD-10-CM

## 2023-06-27 DIAGNOSIS — S81.811A LACERATION OF RIGHT LOWER EXTREMITY, INITIAL ENCOUNTER: ICD-10-CM

## 2023-06-27 NOTE — PROGRESS NOTES
HPI       Raul Oates is a 52 year old  who presents for   Chief Complaint   Patient presents with     Fall     A week ago   Right knee pain, unable to bend knee        52 year-old male with history HTN, hyperlipidemia presents to clinic for evaluation of right knee gertrudis. 1 week ago fell down the curb and landed on right knee anterior aspect. Knee cap is painful. Is swollen. Could walk on it after injury. Rates pain as 7 on scale 0-10 and describes as grating pain, worse with extension. Has taken acetaminophen 1G periodically. Has had right hip pain prior to fall but worse now. History left hip replacement.     Problem, Medication and Allergy Lists were   reviewed and updated if needed.     Patient Active Problem List    Diagnosis Date Noted     Morbid obesity (H) 01/27/2021     Priority: Medium     Hypertension      Priority: Medium     Essential hypertension, benign 10/14/2015     Priority: Medium     History of total hip arthroplasty, left 10/12/2015     Priority: Medium     Hyperlipidemia LDL goal <160 07/22/2015     Priority: Medium         Current Outpatient Medications   Medication Sig Dispense Refill     amLODIPine (NORVASC) 5 MG tablet Take 1 tablet (5 mg) by mouth daily 30 tablet 1     losartan (COZAAR) 25 MG tablet Take 1 tablet (25 mg) by mouth 2 times daily 180 tablet 0         Allergies   Allergen Reactions     No Known Allergies    .    Patient is an established patient of this clinic.         Review of Systems:   Review of Systems  GEN negative fever or chills.   CV: negative for LE temperature change. Elevated BP and taking prescribed medications. Thinks some of it may be due to current pain.   MSK: fell on right knee as per HPI. Knee pain, difficulty walking.underlying right hip pain also. Played a lot of sports in youth and has wear and tear.   NEURO: denies numbness or tingling in LE           Physical Exam:     Vitals:    06/27/23 1245 06/27/23 1248   BP: (!) 161/98 (!) 157/95   Pulse:  "91    Temp: 98.6  F (37  C)    TempSrc: Oral    SpO2: 95%    Weight: 106.6 kg (235 lb)    Height: 1.651 m (5' 5\")      Body mass index is 39.11 kg/m .  Vital signs normal except elevated BP     Physical Exam  GEN: alert male in obvious discomfort in exam room.   MSK: cannot site comfortably in chair and angles on to left hip. Partially flexes right knee, able to extend. Right knee shows edema from superior aspect anteriorly and inferiorly. Is warm to touch Tenderness to palpation along medial joint line and anterior knee. Has 3cm laceration surrounded by halo of erythema that is crusted overwith one area of central clearing obscured by mucoid yellow drainage. Tender. Area was gently debrided to remove some of the eschar and dressed.    RLE is warm and pink to to touch through foot.    NEURO: Sensation is intact.  Walks with marked limp.     Results:   No testing ordered today    Assessment and Plan        1. Acute pain of right knee  Given extent of swelling, objective warmth and reduced ROM, referred to ortho urgent care for further evaluation and treatment. Will need imaging and potential joint aspiration. Discussed with patient and requested he be seen today.     2. Laceration of right lower extremity, initial encounter  To keep wound clean and dry.  Cover when up and around, but open to air at home    3. Hypertension, not well controlled  Elevation today may be impacted by pain, but needs follow up in clinic after knee addressed. Review medications, encourage weight and diet management at that time.      There are no discontinued medications.    Options for treatment and follow-up care were reviewed with the patient. Raul Oates  engaged in the decision making process and verbalized understanding of the options discussed and agreed with the final plan.    Nanda Cisneros, BOBBY CNP  "

## 2023-06-27 NOTE — PATIENT INSTRUCTIONS
Laceration right knee  Keep area clean and dry. Wash with soap and water daily. Try to remove scab with gentle action    Right knee pain and injury  Follow with orthopedics today: you have fluid in the joint and pain along the joint line. You need further evaluation and treatment. Use walk in orthopedic urgent care.     Avoid prolonged walking.

## 2023-06-27 NOTE — NURSING NOTE
"ROOM:1  STEPH COSME    Preferred Name: Raul     How did you hear about us?  Current Patient    52 year old  Chief Complaint   Patient presents with     Fall     A week ago   Right knee pain, unable to bend knee        Blood pressure (!) 161/98, pulse 91, temperature 98.6  F (37  C), temperature source Oral, height 1.651 m (5' 5\"), weight 106.6 kg (235 lb), SpO2 95 %. Body mass index is 39.11 kg/m .  BP completed using cuff size:        Patient Active Problem List   Diagnosis     Hyperlipidemia LDL goal <160     History of total hip arthroplasty, left     Essential hypertension, benign     Hypertension     Morbid obesity (H)       Wt Readings from Last 2 Encounters:   06/27/23 106.6 kg (235 lb)   03/03/23 97.2 kg (214 lb 3.2 oz)     BP Readings from Last 3 Encounters:   06/27/23 (!) 161/98   03/03/23 (!) 164/93   01/11/23 (!) 165/129       Allergies   Allergen Reactions     No Known Allergies        Current Outpatient Medications   Medication     amLODIPine (NORVASC) 5 MG tablet     losartan (COZAAR) 25 MG tablet     Current Facility-Administered Medications   Medication     lidocaine (PF) (XYLOCAINE) 1 % injection 4 mL     triamcinolone (KENALOG-40) injection 40 mg       Social History     Tobacco Use     Smoking status: Never     Smokeless tobacco: Never   Substance Use Topics     Alcohol use: No     Alcohol/week: 0.0 standard drinks of alcohol     Drug use: No       Honoring Choices - Health Care Directive Guide offered to patient at time of visit.    Health Maintenance Due   Topic Date Due     YEARLY PREVENTIVE VISIT  Never done     ADVANCE CARE PLANNING  Never done     HEPATITIS B IMMUNIZATION (1 of 3 - 3-dose series) Never done     Pneumococcal Vaccine: Pediatrics (0 to 5 Years) and At-Risk Patients (6 to 64 Years) (1 - PCV) Never done     COLORECTAL CANCER SCREENING  Never done     HIV SCREENING  Never done     HEPATITIS C SCREENING  Never done     DTAP/TDAP/TD IMMUNIZATION (1 - Tdap) Never done "     ZOSTER IMMUNIZATION (1 of 2) Never done       Immunization History   Administered Date(s) Administered     COVID-19 Bivalent 18+ (Moderna) 09/26/2022     COVID-19 Monovalent 18+ (Moderna) 12/21/2021     Influenza Vaccine >6 months (Alfuria,Fluzone) 10/14/2020       No results found for: PAP    Recent Labs   Lab Test 01/11/23  1640 06/02/21  1333 10/12/20  1405 03/02/20  1627   A1C 6.4* 5.8* 5.7*  --    LDL  --   --  124*  --    HDL  --   --  71  --    TRIG  --   --  62  --    ALT 15  --  48 26   CR 0.80  --  0.78 0.60*   GFRESTIMATED >90  --  >90 >90   GFRESTBLACK  --   --  >90 >90   ALBUMIN 4.5  --  3.7 4.1   POTASSIUM 5.1  --  4.6 4.3           3/3/2023     1:42 PM 1/11/2023     3:22 PM   PHQ-2 ( 1999 Pfizer)   Q1: Little interest or pleasure in doing things 0 0   Q2: Feeling down, depressed or hopeless 0 1   PHQ-2 Score 0 1           10/14/2020     2:25 PM 1/11/2023     3:22 PM   PHQ-9 SCORE   PHQ-9 Total Score 9 6           10/14/2020     2:25 PM 1/11/2023     3:22 PM   ELBERT-7 SCORE   Total Score 10 9            No data to display                Rupesh Helms    June 27, 2023 12:45 PM

## 2023-10-27 ENCOUNTER — OFFICE VISIT (OUTPATIENT)
Dept: FAMILY MEDICINE | Facility: CLINIC | Age: 53
End: 2023-10-27
Payer: COMMERCIAL

## 2023-10-27 VITALS
DIASTOLIC BLOOD PRESSURE: 109 MMHG | TEMPERATURE: 98.5 F | SYSTOLIC BLOOD PRESSURE: 167 MMHG | HEART RATE: 94 BPM | BODY MASS INDEX: 39.15 KG/M2 | HEIGHT: 65 IN | OXYGEN SATURATION: 95 % | WEIGHT: 235 LBS

## 2023-10-27 DIAGNOSIS — D49.2 ABNORMAL SKIN GROWTH: Primary | ICD-10-CM

## 2023-10-27 DIAGNOSIS — I10 HYPERTENSION, UNSPECIFIED TYPE: ICD-10-CM

## 2023-10-27 DIAGNOSIS — R73.03 PRE-DIABETES: ICD-10-CM

## 2023-10-27 DIAGNOSIS — R10.84 ABDOMINAL PAIN, GENERALIZED: ICD-10-CM

## 2023-10-27 LAB
ALBUMIN SERPL BCG-MCNC: 4.4 G/DL (ref 3.5–5.2)
ALP SERPL-CCNC: 136 U/L (ref 40–129)
ALT SERPL W P-5'-P-CCNC: 25 U/L (ref 0–70)
ANION GAP SERPL CALCULATED.3IONS-SCNC: 10 MMOL/L (ref 7–15)
AST SERPL W P-5'-P-CCNC: 29 U/L (ref 0–45)
BASOPHILS # BLD AUTO: 0 10E3/UL (ref 0–0.2)
BASOPHILS NFR BLD AUTO: 0 %
BILIRUB SERPL-MCNC: 0.6 MG/DL
BUN SERPL-MCNC: 12.8 MG/DL (ref 6–20)
CALCIUM SERPL-MCNC: 9.5 MG/DL (ref 8.6–10)
CHLORIDE SERPL-SCNC: 102 MMOL/L (ref 98–107)
CREAT SERPL-MCNC: 0.95 MG/DL (ref 0.67–1.17)
DEPRECATED HCO3 PLAS-SCNC: 28 MMOL/L (ref 22–29)
EGFRCR SERPLBLD CKD-EPI 2021: >90 ML/MIN/1.73M2
EOSINOPHIL # BLD AUTO: 0.1 10E3/UL (ref 0–0.7)
EOSINOPHIL NFR BLD AUTO: 2 %
ERYTHROCYTE [DISTWIDTH] IN BLOOD BY AUTOMATED COUNT: 14 % (ref 10–15)
GLUCOSE SERPL-MCNC: 151 MG/DL (ref 70–99)
HBA1C MFR BLD: 6.3 %
HCT VFR BLD AUTO: 45.5 % (ref 40–53)
HGB BLD-MCNC: 15.2 G/DL (ref 13.3–17.7)
IMM GRANULOCYTES # BLD: 0 10E3/UL
IMM GRANULOCYTES NFR BLD: 1 %
LYMPHOCYTES # BLD AUTO: 1.8 10E3/UL (ref 0.8–5.3)
LYMPHOCYTES NFR BLD AUTO: 20 %
MCH RBC QN AUTO: 28.6 PG (ref 26.5–33)
MCHC RBC AUTO-ENTMCNC: 33.4 G/DL (ref 31.5–36.5)
MCV RBC AUTO: 86 FL (ref 78–100)
MONOCYTES # BLD AUTO: 0.5 10E3/UL (ref 0–1.3)
MONOCYTES NFR BLD AUTO: 5 %
NEUTROPHILS # BLD AUTO: 6.4 10E3/UL (ref 1.6–8.3)
NEUTROPHILS NFR BLD AUTO: 72 %
NRBC # BLD AUTO: 0 10E3/UL
NRBC BLD AUTO-RTO: 0 /100
PLATELET # BLD AUTO: 235 10E3/UL (ref 150–450)
POTASSIUM SERPL-SCNC: 4.8 MMOL/L (ref 3.4–5.3)
PROT SERPL-MCNC: 7.4 G/DL (ref 6.4–8.3)
RBC # BLD AUTO: 5.31 10E6/UL (ref 4.4–5.9)
SODIUM SERPL-SCNC: 140 MMOL/L (ref 135–145)
WBC # BLD AUTO: 8.8 10E3/UL (ref 4–11)

## 2023-10-27 PROCEDURE — 80053 COMPREHEN METABOLIC PANEL: CPT | Mod: ORL | Performed by: NURSE PRACTITIONER

## 2023-10-27 PROCEDURE — 83036 HEMOGLOBIN GLYCOSYLATED A1C: CPT | Mod: ORL | Performed by: NURSE PRACTITIONER

## 2023-10-27 PROCEDURE — 85004 AUTOMATED DIFF WBC COUNT: CPT | Mod: ORL | Performed by: NURSE PRACTITIONER

## 2023-10-27 RX ORDER — AMLODIPINE AND VALSARTAN 5; 160 MG/1; MG/1
1 TABLET ORAL DAILY
Qty: 30 TABLET | Refills: 1 | Status: SHIPPED | OUTPATIENT
Start: 2023-10-27 | End: 2023-11-13 | Stop reason: DRUGHIGH

## 2023-10-27 ASSESSMENT — ENCOUNTER SYMPTOMS
VOMITING: 0
ABDOMINAL PAIN: 1
NAUSEA: 0
DIARRHEA: 0

## 2023-10-27 NOTE — NURSING NOTE
"ROOM:2  [facility]ALI [facility]A    Preferred Name: Raul     How did you hear about us?  Current Patient    53 year old  Chief Complaint   Patient presents with     Mole     Had for a while, some pain, red and bloody, left shoulder       Blood pressure (!) 167/109, pulse 94, temperature 98.5  F (36.9  C), temperature source Oral, height 1.651 m (5' 5\"), weight 106.6 kg (235 lb), SpO2 95%. Body mass index is 39.11 kg/m .  BP completed using cuff size:        Patient Active Problem List   Diagnosis     Hyperlipidemia LDL goal <160     History of total hip arthroplasty, left     Essential hypertension, benign     Hypertension     Morbid obesity (H)     Acute pain of right knee       Wt Readings from Last 2 Encounters:   10/27/23 106.6 kg (235 lb)   06/27/23 106.6 kg (235 lb)     BP Readings from Last 3 Encounters:   10/27/23 (!) 167/109   06/27/23 (!) 157/95   03/03/23 (!) 164/93       Allergies   Allergen Reactions     No Known Allergies        Current Outpatient Medications   Medication     amLODIPine (NORVASC) 5 MG tablet     losartan (COZAAR) 25 MG tablet     Current Facility-Administered Medications   Medication     lidocaine (PF) (XYLOCAINE) 1 % injection 4 mL     triamcinolone (KENALOG-40) injection 40 mg       Social History     Tobacco Use     Smoking status: Never     Smokeless tobacco: Never   Substance Use Topics     Alcohol use: No     Alcohol/week: 0.0 standard drinks of alcohol     Drug use: No       Honoring Choices - Health Care Directive Guide offered to patient at time of visit.    Health Maintenance Due   Topic Date Due     YEARLY PREVENTIVE VISIT  Never done     ADVANCE CARE PLANNING  Never done     HEPATITIS B IMMUNIZATION (1 of 3 - 3-dose series) Never done     Pneumococcal Vaccine: Pediatrics (0 to 5 Years) and At-Risk Patients (6 to 64 Years) (1 - PCV) Never done     COLORECTAL CANCER SCREENING  Never done     HIV SCREENING  Never done     HEPATITIS C SCREENING  Never done     DTAP/TDAP/TD " "IMMUNIZATION (1 - Tdap) Never done     ZOSTER IMMUNIZATION (1 of 2) Never done     INFLUENZA VACCINE (1) 09/01/2023     COVID-19 Vaccine (6 - 2023-24 season) 09/01/2023       Immunization History   Administered Date(s) Administered     COVID-19 Bivalent 18+ (Moderna) 09/26/2022     COVID-19 MONOVALENT 12+ (Pfizer) 04/30/2021, 05/18/2021     COVID-19 Monovalent 18+ (Moderna) 12/21/2021     Influenza Vaccine >6 months (Alfuria,Fluzone) 10/14/2020       No results found for: \"PAP\"    Recent Labs   Lab Test 01/11/23  1640 06/02/21  1333 10/12/20  1405 03/02/20  1627   A1C 6.4* 5.8* 5.7*  --    LDL  --   --  124*  --    HDL  --   --  71  --    TRIG  --   --  62  --    ALT 15  --  48 26   CR 0.80  --  0.78 0.60*   GFRESTIMATED >90  --  >90 >90   GFRESTBLACK  --   --  >90 >90   ALBUMIN 4.5  --  3.7 4.1   POTASSIUM 5.1  --  4.6 4.3           3/3/2023     1:42 PM 1/11/2023     3:22 PM   PHQ-2 ( 1999 Pfizer)   Q1: Little interest or pleasure in doing things 0 0   Q2: Feeling down, depressed or hopeless 0 1   PHQ-2 Score 0 1           10/14/2020     2:25 PM 1/11/2023     3:22 PM   PHQ-9 SCORE   PHQ-9 Total Score 9 6           10/14/2020     2:25 PM 1/11/2023     3:22 PM   ELBERT-7 SCORE   Total Score 10 9            No data to display                Varsha Simmons    October 27, 2023 1:59 PM      "

## 2023-10-27 NOTE — PROGRESS NOTES
Today's Date: Oct 27, 2023     Patient Raul Oates 1970 presents to the clinic today to address   Chief Complaint   Patient presents with    Mole     Had for a while, some pain, red and bloody, left shoulder             SUBJECTIVE     History of Present Illness:    53-year-old male with PMH HTN, Pre-diabetes, hematuria, elevated alk phos, bilateral Hip OA (S/p L hip replacement 2015) presents to discuss a few concerns.    Skin Lesion- Patient reports chronic skin lesion to left anterior shoulder.  He reports that Xochitl Nolan CNP examined this concern a few years ago and advised to monitor.  He reports that the lesion has enlarged recently and started to bleed 4 months ago.  He called a dermatology office but could not schedule a timely evaluation.    Abdominal pain-patient reports intermittent abdominal pain (approximately 2-3 times) over the past 6 months.  The pain is generalized.  He is concerned that he may have a stomach ulcer.  He also believes this pain could be related to stress.  He denies worsening of pain with food or drinking. He endoses some acid reflux symptoms at night. He noticed a nodule to his left lateral stomach that is tender when he presses on it.  He had a nodule to the right upper quadrant which an ultrasound demonstrated likely lipoma in 2020 .  The pain resolves spontaneously.  He is not taking medication for this. He denies pain at time of exam.     HTN- Patient admits he was not taking his previously prescribed regimen of amlodipine 5 mg daily with losartan 25 mg twice daily. No other acute concerns/symptoms at time of exam.            Review of Systems   Gastrointestinal: Positive for abdominal pain. Negative for diarrhea, nausea and vomiting.   Constitutional, HEENT, cardiovascular, pulmonary, gi and gu systems are negative, except as otherwise noted.      Allergies   Allergen Reactions    No Known Allergies         Current Outpatient Medications   Medication Instructions     "amLODIPine (NORVASC) 5 mg, Oral, DAILY    losartan (COZAAR) 25 mg, Oral, 2 TIMES DAILY       Past Medical History:   Diagnosis Date    Epistaxis     Groin mass     Hip pain     Hypertension         Family History   Problem Relation Age of Onset    Unknown/Adopted Mother     Coronary Artery Disease Maternal Grandfather     Unknown/Adopted Father         Social History     Tobacco Use    Smoking status: Never    Smokeless tobacco: Never   Substance Use Topics    Alcohol use: No     Alcohol/week: 0.0 standard drinks of alcohol    Drug use: No        History   Sexual Activity    Sexual activity: Not Currently            10/14/2020     2:25 PM 1/11/2023     3:22 PM   PHQ   PHQ-9 Total Score 9 6   Q9: Thoughts of better off dead/self-harm past 2 weeks Not at all Not at all        Immunization History   Administered Date(s) Administered    COVID-19 Bivalent 18+ (Moderna) 09/26/2022    COVID-19 MONOVALENT 12+ (Pfizer) 04/30/2021, 05/18/2021    COVID-19 Monovalent 18+ (Moderna) 12/21/2021    Influenza Vaccine >6 months (Alfuria,Fluzone) 10/14/2020                 OBJECTIVE     BP (!) 167/109 (BP Location: Right arm, Patient Position: Sitting, Cuff Size: Adult Large)   Pulse 94   Temp 98.5  F (36.9  C) (Oral)   Ht 1.651 m (5' 5\")   Wt 106.6 kg (235 lb)   SpO2 95%   BMI 39.11 kg/m       Labs:  Lab Results   Component Value Date    WBC 8.5 01/11/2023    HGB 15.2 01/11/2023    HCT 46.0 01/11/2023     01/11/2023    CHOL 208 (H) 10/12/2020    TRIG 62 10/12/2020    HDL 71 10/12/2020    ALT 15 01/11/2023    AST 22 01/11/2023     01/11/2023    BUN 15.1 01/11/2023    CO2 30 (H) 01/11/2023    PSA 3.03 01/11/2023    INR 0.99 03/02/2020        Physical Exam  Constitutional:       General: He is not in acute distress.     Appearance: He is not ill-appearing.      Comments: Pronounced antalgic gait.   Cardiovascular:      Rate and Rhythm: Normal rate and regular rhythm.      Heart sounds: Normal heart sounds. No murmur " heard.  Pulmonary:      Effort: Pulmonary effort is normal. No respiratory distress.      Breath sounds: Normal breath sounds. No wheezing or rales.   Abdominal:      General: Bowel sounds are normal. There is distension.      Palpations: Abdomen is soft.      Tenderness: There is no abdominal tenderness.       Musculoskeletal:      Cervical back: Neck supple.   Skin:     Findings: Lesion present.          Neurological:      General: No focal deficit present.      Mental Status: He is alert.   Psychiatric:         Thought Content: Thought content normal.         Judgment: Judgment normal.               ASSESSMENT/PLAN     1. Abnormal skin growth  Approximately 2.5 cm masslike lesion noted to the left shoulder.  The lesion was not actively bleeding, however, there were blood stains noted on his shirt.  We will obtain a CBC today and priority referral placed to dermatology for removal/Bx.  - Adult Dermatology  Referral  - CBC with platelets differential    2. Hypertension, unspecified type  We had a myrna discussion regarding patient's nonadherence.  Informed patient on importance of blood pressure control especially in reducing the risk of heart attack or stroke that comes with chronically uncontrolled blood pressure.  Patient reports that he struggled to remember to take losartan twice daily.  In light of this, we will discontinue amlodipine and losartan twice daily and start patient on amlodipine-valsartan combination pill. Encouraged home BP monitoring. Follow-up in 1 month.  - amLODIPine-valsartan (EXFORGE) 5-160 MG tablet; Take 1 tablet by mouth daily  Dispense: 30 tablet; Refill: 1  - Comprehensive metabolic panel    3. Pre-diabetes  - Hemoglobin A1c    4. Abdominal pain, generalized  Pt hypertensive, otherwise in NAD. Patient reports 6-month history of intermittent generalized abdominal pain.  Advised patient that to properly evaluate a stomach ulcer, we would have to complete an EGD to which he was  hesitant to pursue.  He has some distention today, otherwise his bowel sounds were normal and he did not demonstrate any tenderness.  He did have a marble sized lump noted to left lateral abdomen.  Advised patient to monitor lump for any change in size.  Distention could be from relatively sedentary life (ambulation is difficult for the patient considering chronic hip pain).  Stress could also be contributory. Considering his heistancy to pursue an EGD, we discussed risk/benefits of CT of abdomen/pelvis including radiation.  After discussion patient agreeable to obtain CT.   - CT Abdomen Pelvis w/o & w Contrast; Future  - CBC with platelets differential    Patient also encouraged to schedule follow-up with urology considering past hematuria.      Follow-Up:  - Follow up in 1 month(s), or if symptoms worsen or fail to improve.     Options for treatment and follow-up care were reviewed with the patient. Patient engaged in the decision making process and verbalized understanding of the options discussed and agreed with the final plan.  AVS printed and given to patient.    BOBBY Sexton [facility]  [address] [place] [address] [id number] [phone]        Note: Chart documentation was done in part with Dragon Voice Recognition software.  Although reviewed after completion, some word and grammatical errors may remain. Please contact author for any clarification or concerns.

## 2023-10-28 NOTE — RESULT ENCOUNTER NOTE
Hi all, Can someone please ensure this patient is scheduled to see me next month for a blood pressure evaluation? Thanks!

## 2023-10-29 RX ORDER — AMLODIPINE AND VALSARTAN 5; 160 MG/1; MG/1
1 TABLET ORAL DAILY
Qty: 90 TABLET | OUTPATIENT
Start: 2023-10-29

## 2023-10-31 ENCOUNTER — OFFICE VISIT (OUTPATIENT)
Dept: DERMATOLOGY | Facility: CLINIC | Age: 53
End: 2023-10-31
Payer: COMMERCIAL

## 2023-10-31 DIAGNOSIS — L72.0 EIC (EPIDERMAL INCLUSION CYST): Primary | ICD-10-CM

## 2023-10-31 DIAGNOSIS — D18.01 CHERRY ANGIOMA: ICD-10-CM

## 2023-10-31 DIAGNOSIS — D49.2 NEOPLASM OF UNSPECIFIED BEHAVIOR OF BONE, SOFT TISSUE, AND SKIN: ICD-10-CM

## 2023-10-31 PROCEDURE — 88305 TISSUE EXAM BY PATHOLOGIST: CPT | Mod: TC | Performed by: DERMATOLOGY

## 2023-10-31 PROCEDURE — 11103 TANGNTL BX SKIN EA SEP/ADDL: CPT | Performed by: DERMATOLOGY

## 2023-10-31 PROCEDURE — 99203 OFFICE O/P NEW LOW 30 MIN: CPT | Mod: 25 | Performed by: DERMATOLOGY

## 2023-10-31 PROCEDURE — 11102 TANGNTL BX SKIN SINGLE LES: CPT | Performed by: DERMATOLOGY

## 2023-10-31 ASSESSMENT — PAIN SCALES - GENERAL: PAINLEVEL: NO PAIN (0)

## 2023-10-31 NOTE — LETTER
10/31/2023       RE: Raul Oates  [address]  [facility] [address]     Dear Colleague,    Thank you for referring your patient, Raul Oates, to the [facility] [place] [facility]. Please see a copy of my visit note below.    [facility] Dermatology Note  Encounter Date: Oct 31, 2023  Office Visit     Dermatology Problem List:  # NUB left anterior shoulder, left vertex of scalp  -2 shave biopsies performed today 10/31/23, pending results  # cherry angioma, central chest  # cyst on back    ____________________________________________    Assessment & Plan:    # Neoplasm of unspecified behavior, of the left anterior shoulder   - ddx KA   - shave biopsy was performed today 10/31/23, see procedure note below    # Cherry angioma, central chest  - reassurance provided    # Cyst on back  - educated about potential to burst, reassurance provided    # Neoplasm of unspecified behavior, of the left vertex of scalp  - ddx HAK vs SCCIS   - shave biopsy was performed 10/31/23, see procedure note below    Procedures Performed:   - Shave biopsy procedure note, location(s): left shoulder, left vertex of scalp. After discussion of benefits and risks including but not limited to bleeding, infection, scar, incomplete removal, recurrence, and non-diagnostic biopsy, written consent and photographs were obtained. The area was cleaned with isopropyl alcohol. 0.5mL of 1% lidocaine with epinephrine was injected to obtain adequate anesthesia of lesion(s). Shave biopsy at site(s) performed. Hemostasis was achieved with aluminium chloride. Petrolatum ointment and a sterile dressing were applied. The patient tolerated the procedure and no complications were noted. The patient was provided with verbal and written post care instructions.     Follow-up: 6 month(s) in-person, or earlier for new or changing lesions    Staff and Scribe:      Scribe Disclosure:   I, MATEO BORJAS, am serving as a scribe; to document services personally performed by Dylan Marquez MD-based on data collection and the provider's statements to me.      Staff attestation:  The documentation recorded by the scribe accurately reflects the services I personally performed and the decisions I personally made. I have made edits where needed.    Dylan Marquez MD  Staff Dermatologist and Dermatopathologist  , Department of Dermatology   ____________________________________________    CC: Derm Problem (Patient is here for a skin lesion on his left shoulder that has been bleeding. The spot appeared around 3-4 months ago and has kept getting bigger. )    HPI:  Mr. Raul Oates is a(n) 53 year old male who presents today as a new patient for a spot of concern on his left shoulder. Patient states he first noticed the spot about 2 months ago, and it has continued growing in size. There is also a spot of concern on the top of his head he noticed about 4 months ago, he described the area as dry skin. He denies implanted devices around the areas of concern such as a pacemaker    Patient is otherwise feeling well, without additional skin concerns.    Labs Reviewed:  N/A    Physical Exam:  Vitals: There were no vitals taken for this visit.  SKIN: Total skin excluding the undergarment areas was performed. The exam included the head/face, neck, both arms, chest, back, abdomen, both legs, digits and/or nails.     - 2.6 x 2.0 cm tumor with a rolled border, shiny, ulcerated surface  - 6 mm karatotic papule on left vertex of the scalp, inflamed base  - keratosis pyloris   - cyst on back  - No other lesions of concern on areas examined.     Medications:  Current Outpatient Medications   Medication    amLODIPine-valsartan (EXFORGE) 5-160 MG tablet     Current Facility-Administered Medications   Medication    lidocaine (PF) (XYLOCAINE) 1 % injection 4 mL     triamcinolone (KENALOG-40) injection 40 mg      Past Medical History:   Patient Active Problem List   Diagnosis    Hyperlipidemia LDL goal <160    History of total hip arthroplasty, left    Essential hypertension, benign    Hypertension    Morbid obesity (H)    Acute pain of right knee     Past Medical History:   Diagnosis Date    Epistaxis     Groin mass     Hip pain     Hypertension         CC Jonathon Alas, APRN CNP  [address] [place] [address] on close of this encounter.

## 2023-10-31 NOTE — NURSING NOTE
Dermatology Rooming Note    Raul Oates's goals for this visit include:   Chief Complaint   Patient presents with    Derm Problem     Patient is here for a skin lesion on his left shoulder that has been bleeding. The spot appeared around 3-4 months ago and has kept getting bigger.      Zulema Dominguez, visit facilitator

## 2023-10-31 NOTE — PROGRESS NOTES
[facility] Dermatology Note  Encounter Date: Oct 31, 2023  Office Visit     Dermatology Problem List:  # NUB left anterior shoulder, left vertex of scalp  -2 shave biopsies performed today 10/31/23, pending results  # cherry angioma, central chest  # cyst on back    ____________________________________________    Assessment & Plan:    # Neoplasm of unspecified behavior, of the left anterior shoulder   - ddx KA   - shave biopsy was performed today 10/31/23, see procedure note below    # Cherry angioma, central chest  - reassurance provided    # Cyst on back  - educated about potential to burst, reassurance provided    # Neoplasm of unspecified behavior, of the left vertex of scalp  - ddx HAK vs SCCIS   - shave biopsy was performed 10/31/23, see procedure note below    Procedures Performed:   - Shave biopsy procedure note, location(s): left shoulder, left vertex of scalp. After discussion of benefits and risks including but not limited to bleeding, infection, scar, incomplete removal, recurrence, and non-diagnostic biopsy, written consent and photographs were obtained. The area was cleaned with isopropyl alcohol. 0.5mL of 1% lidocaine with epinephrine was injected to obtain adequate anesthesia of lesion(s). Shave biopsy at site(s) performed. Hemostasis was achieved with aluminium chloride. Petrolatum ointment and a sterile dressing were applied. The patient tolerated the procedure and no complications were noted. The patient was provided with verbal and written post care instructions.     Follow-up: 6 month(s) in-person, or earlier for new or changing lesions    Staff and Scribe:     Scribe Disclosure:   MATEO SPRAGUE, am serving as a scribe; to document services personally performed by Dylan Marquez MD-based on data collection and the provider's statements to me.      Staff attestation:  The documentation recorded by the scribe accurately reflects the services I personally performed and  the decisions I personally made. I have made edits where needed.    Dylan Marquez MD  Staff Dermatologist and Dermatopathologist  , Department of Dermatology   ____________________________________________    CC: Derm Problem (Patient is here for a skin lesion on his left shoulder that has been bleeding. The spot appeared around 3-4 months ago and has kept getting bigger. )    HPI:  Mr. Raul Oates is a(n) 53 year old male who presents today as a new patient for a spot of concern on his left shoulder. Patient states he first noticed the spot about 2 months ago, and it has continued growing in size. There is also a spot of concern on the top of his head he noticed about 4 months ago, he described the area as dry skin. He denies implanted devices around the areas of concern such as a pacemaker    Patient is otherwise feeling well, without additional skin concerns.    Labs Reviewed:  N/A    Physical Exam:  Vitals: There were no vitals taken for this visit.  SKIN: Total skin excluding the undergarment areas was performed. The exam included the head/face, neck, both arms, chest, back, abdomen, both legs, digits and/or nails.     - 2.6 x 2.0 cm tumor with a rolled border, shiny, ulcerated surface  - 6 mm karatotic papule on left vertex of the scalp, inflamed base  - keratosis pyloris   - cyst on back  - No other lesions of concern on areas examined.     Medications:  Current Outpatient Medications   Medication     amLODIPine-valsartan (EXFORGE) 5-160 MG tablet     Current Facility-Administered Medications   Medication     lidocaine (PF) (XYLOCAINE) 1 % injection 4 mL     triamcinolone (KENALOG-40) injection 40 mg      Past Medical History:   Patient Active Problem List   Diagnosis     Hyperlipidemia LDL goal <160     History of total hip arthroplasty, left     Essential hypertension, benign     Hypertension     Morbid obesity (H)     Acute pain of right knee     Past Medical History:   Diagnosis  Date     Epistaxis      Groin mass      Hip pain      Hypertension         CC Jonathon Alas, APRN CNP  [address] [place] [address] on close of this encounter.

## 2023-10-31 NOTE — NURSING NOTE
Lidocaine-epinephrine 1-1:012087 % injection   7.5mL once for one use, starting 10/31/2023 ending 10/31/2023,  2mL disp, R-0, injection  Injected by Ashley Grant CMA

## 2023-11-01 LAB
PATH REPORT.COMMENTS IMP SPEC: ABNORMAL
PATH REPORT.COMMENTS IMP SPEC: ABNORMAL
PATH REPORT.COMMENTS IMP SPEC: YES
PATH REPORT.FINAL DX SPEC: ABNORMAL
PATH REPORT.GROSS SPEC: ABNORMAL
PATH REPORT.MICROSCOPIC SPEC OTHER STN: ABNORMAL
PATH REPORT.RELEVANT HX SPEC: ABNORMAL

## 2023-11-03 ENCOUNTER — TELEPHONE (OUTPATIENT)
Dept: DERMATOLOGY | Facility: CLINIC | Age: 53
End: 2023-11-03
Payer: COMMERCIAL

## 2023-11-03 DIAGNOSIS — C44.91 BCC (BASAL CELL CARCINOMA): Primary | ICD-10-CM

## 2023-11-07 ENCOUNTER — TELEPHONE (OUTPATIENT)
Dept: DERMATOLOGY | Facility: CLINIC | Age: 53
End: 2023-11-07
Payer: COMMERCIAL

## 2023-11-07 NOTE — TELEPHONE ENCOUNTER
"Called patient to schedule surgery with Dr. Gilmore    Date of Surgery: 01/08    Surgery type: mohs    Consult scheduled: Yes    Has patient had mohs with us before? No    Additional comments: I called patient to schedule with derm surg about the \"spot\" on his shoulder and patient quickly agreed that now was a good time to schedule and sounded like he knew the reason for my call. Pt's chart shows that he had viewed the pathology report, but he later said \"he wanted to make sure it wasn't cancer or anything.\" I suggested that patient review the report and the message from Dr. Marquez again and then call or message is he still had any questions about the plan.     Sending to Dr. Marquez and Becca as a FYI.      Roxana Steve on 11/7/2023 at 9:23 AM    "

## 2023-11-08 NOTE — TELEPHONE ENCOUNTER
FUTURE VISIT INFORMATION      FUTURE VISIT INFORMATION:  Date: 12.18.23  Time: 2:15  Location: [facility]  REFERRAL INFORMATION:  Referring provider:  Jimmy  Referring providers clinic:  Derm  Reason for visit/diagnosis  Left Anterior Shoulder-BCC-[facility]      RECORDS REQUESTED FROM:       Clinic name Comments Records Status Imaging Status   Derm 10.31.23  Path # [id number] Epic Epic

## 2023-11-08 NOTE — TELEPHONE ENCOUNTER
He might have been referring to the spot on the scalp or gotten confused about it.    I'll send a follow up.

## 2023-11-10 ENCOUNTER — OFFICE VISIT (OUTPATIENT)
Dept: FAMILY MEDICINE | Facility: CLINIC | Age: 53
End: 2023-11-10
Payer: COMMERCIAL

## 2023-11-10 VITALS
WEIGHT: 256 LBS | OXYGEN SATURATION: 96 % | HEIGHT: 65 IN | DIASTOLIC BLOOD PRESSURE: 84 MMHG | TEMPERATURE: 97.7 F | HEART RATE: 57 BPM | BODY MASS INDEX: 42.65 KG/M2 | SYSTOLIC BLOOD PRESSURE: 149 MMHG

## 2023-11-10 DIAGNOSIS — I10 HYPERTENSION, UNSPECIFIED TYPE: Primary | ICD-10-CM

## 2023-11-10 DIAGNOSIS — I10 HYPERTENSION, UNSPECIFIED TYPE: ICD-10-CM

## 2023-11-10 RX ORDER — AMLODIPINE AND VALSARTAN 5; 320 MG/1; MG/1
1 TABLET ORAL DAILY
Qty: 30 TABLET | Refills: 1 | Status: SHIPPED | OUTPATIENT
Start: 2023-11-10 | End: 2023-11-10 | Stop reason: DRUGHIGH

## 2023-11-10 ASSESSMENT — ENCOUNTER SYMPTOMS
WHEEZING: 1
FEVER: 0
COUGH: 0
CHILLS: 0
CHEST TIGHTNESS: 1
FATIGUE: 0
HEADACHES: 0
PALPITATIONS: 0
DIZZINESS: 0
SHORTNESS OF BREATH: 0

## 2023-11-10 NOTE — NURSING NOTE
"ROOM:1  NATALI BARBOSA    Preferred Name: Raul     How did you hear about us?  Current Patient    53 year old  Chief Complaint   Patient presents with     Hypertension       Blood pressure (!) 165/78, pulse 57, temperature 97.7  F (36.5  C), temperature source Oral, height 1.638 m (5' 4.5\"), weight 116.1 kg (256 lb), SpO2 96%. Body mass index is 43.26 kg/m .  BP completed using cuff size:        Patient Active Problem List   Diagnosis     Hyperlipidemia LDL goal <160     History of total hip arthroplasty, left     Essential hypertension, benign     Hypertension     Morbid obesity (H)     Acute pain of right knee       Wt Readings from Last 2 Encounters:   11/10/23 116.1 kg (256 lb)   10/27/23 106.6 kg (235 lb)     BP Readings from Last 3 Encounters:   11/10/23 (!) 165/78   10/27/23 (!) 167/109   06/27/23 (!) 157/95       Allergies   Allergen Reactions     No Known Allergies        Current Outpatient Medications   Medication     amLODIPine-valsartan (EXFORGE) 5-160 MG tablet     Current Facility-Administered Medications   Medication     lidocaine (PF) (XYLOCAINE) 1 % injection 4 mL     triamcinolone (KENALOG-40) injection 40 mg       Social History     Tobacco Use     Smoking status: Never     Smokeless tobacco: Never   Substance Use Topics     Alcohol use: No     Alcohol/week: 0.0 standard drinks of alcohol     Drug use: No       Honoring Choices - Health Care Directive Guide offered to patient at time of visit.    Health Maintenance Due   Topic Date Due     YEARLY PREVENTIVE VISIT  Never done     ADVANCE CARE PLANNING  Never done     HEPATITIS B IMMUNIZATION (1 of 3 - 3-dose series) Never done     Pneumococcal Vaccine: Pediatrics (0 to 5 Years) and At-Risk Patients (6 to 64 Years) (1 - PCV) Never done     COLORECTAL CANCER SCREENING  Never done     HIV SCREENING  Never done     HEPATITIS C SCREENING  Never done     DTAP/TDAP/TD IMMUNIZATION (1 - Tdap) Never done     ZOSTER IMMUNIZATION (1 of 2) Never done     " "INFLUENZA VACCINE (1) 09/01/2023     COVID-19 Vaccine (6 - 2023-24 season) 09/01/2023       Immunization History   Administered Date(s) Administered     COVID-19 Bivalent 18+ (Moderna) 09/26/2022     COVID-19 MONOVALENT 12+ (Pfizer) 04/30/2021, 05/18/2021     COVID-19 Monovalent 18+ (Moderna) 12/21/2021     Influenza Vaccine >6 months (Alfuria,Fluzone) 10/14/2020       No results found for: \"PAP\"    Recent Labs   Lab Test 10/27/23  1431 01/11/23  1640 06/02/21  1333 10/12/20  1405 03/02/20  1627 03/02/20  1627   A1C 6.3* 6.4* 5.8* 5.7*   < >  --    LDL  --   --   --  124*  --   --    HDL  --   --   --  71  --   --    TRIG  --   --   --  62  --   --    ALT 25 15  --  48  --  26   CR 0.95 0.80  --  0.78  --  0.60*   GFRESTIMATED >90 >90  --  >90  --  >90   GFRESTBLACK  --   --   --  >90  --  >90   ALBUMIN 4.4 4.5  --  3.7  --  4.1   POTASSIUM 4.8 5.1  --  4.6  --  4.3    < > = values in this interval not displayed.           3/3/2023     1:42 PM 1/11/2023     3:22 PM   PHQ-2 ( 1999 Pfizer)   Q1: Little interest or pleasure in doing things 0 0   Q2: Feeling down, depressed or hopeless 0 1   PHQ-2 Score 0 1           10/14/2020     2:25 PM 1/11/2023     3:22 PM   PHQ-9 SCORE   PHQ-9 Total Score 9 6           10/14/2020     2:25 PM 1/11/2023     3:22 PM   ELBERT-7 SCORE   Total Score 10 9            No data to display                Rupesh Helms    November 10, 2023 3:36 PM    "

## 2023-11-10 NOTE — PROGRESS NOTES
Today's Date: Nov 10, 2023     Patient Raul Oates 1970 presents to the clinic today to address   Chief Complaint   Patient presents with    Hypertension             SUBJECTIVE     History of Present Illness:    Raul is a 53 year old male with a past medical history of HTN, prediabetes, HLD, and bilateral hip OA, presents for a blood pressure follow up. Patient states his systolic blood pressure at home has been in the 170s, though he has not checked it recently. He is tolerating the Amlodipine-Valsartan combination pill without side effects. He endorses intermittent chest tightness in the left upper chest that he describes as a muscle spasm. He had an episode this morning that lasted 3-4 minutes and then went away on its own. He denies pain right now. He also endorses intermittent periods of his heart racing. He denies any shortness of breath with these episodes.     The patient also endorses feelings of wheezing that started earlier this morning. He believes it is from turning on a heater at work. He states that it has improved as the day went on. He denies any feelings of shortness of breath. No other acute concerns at this time.       Review of Systems   Constitutional:  Negative for chills, fatigue and fever.   HENT:  Negative for congestion.    Respiratory:  Positive for chest tightness and wheezing. Negative for cough and shortness of breath.    Cardiovascular:  Negative for chest pain, palpitations and peripheral edema.   Neurological:  Negative for dizziness and headaches.   All other systems reviewed and are negative.        Allergies   Allergen Reactions    No Known Allergies         Current Outpatient Medications   Medication Instructions    amLODIPine-valsartan (EXFORGE) 5-160 MG tablet 1 tablet, Oral, DAILY       Past Medical History:   Diagnosis Date    Epistaxis     Groin mass     Hip pain     Hypertension         Family History   Problem Relation Age of Onset    Unknown/Adopted Mother      "Coronary Artery Disease Maternal Grandfather     Unknown/Adopted Father         Social History     Tobacco Use    Smoking status: Never    Smokeless tobacco: Never   Substance Use Topics    Alcohol use: No     Alcohol/week: 0.0 standard drinks of alcohol    Drug use: No        History   Sexual Activity    Sexual activity: Not Currently            10/14/2020     2:25 PM 1/11/2023     3:22 PM   PHQ   PHQ-9 Total Score 9 6   Q9: Thoughts of better off dead/self-harm past 2 weeks Not at all Not at all        Immunization History   Administered Date(s) Administered    COVID-19 Bivalent 18+ (Moderna) 09/26/2022    COVID-19 MONOVALENT 12+ (Pfizer) 04/30/2021, 05/18/2021    COVID-19 Monovalent 18+ (Moderna) 12/21/2021    Influenza Vaccine >6 months (Alfuria,Fluzone) 10/14/2020                 OBJECTIVE     BP (!) 149/84 (BP Location: Right arm, Patient Position: Sitting, Cuff Size: Adult Regular)   Pulse 57   Temp 97.7  F (36.5  C) (Oral)   Ht 1.638 m (5' 4.5\")   Wt 116.1 kg (256 lb)   SpO2 96%   BMI 43.26 kg/m       Labs:  Lab Results   Component Value Date    WBC 8.8 10/27/2023    HGB 15.2 10/27/2023    HCT 45.5 10/27/2023     10/27/2023    CHOL 208 (H) 10/12/2020    TRIG 62 10/12/2020    HDL 71 10/12/2020    ALT 25 10/27/2023    AST 29 10/27/2023     10/27/2023    BUN 12.8 10/27/2023    CO2 28 10/27/2023    PSA 3.03 01/11/2023    INR 0.99 03/02/2020        Physical Exam  Vitals reviewed.   Constitutional:       General: He is not in acute distress.     Appearance: He is not ill-appearing.   Eyes:      Conjunctiva/sclera: Conjunctivae normal.      Pupils: Pupils are equal, round, and reactive to light.   Cardiovascular:      Rate and Rhythm: Normal rate. Frequent Extrasystoles are present.     Heart sounds: Normal heart sounds. No murmur heard.  Pulmonary:      Effort: Pulmonary effort is normal. No respiratory distress.      Breath sounds: Normal breath sounds. No wheezing or rales.   Musculoskeletal: "      Cervical back: Neck supple.   Lymphadenopathy:      Cervical: No cervical adenopathy.   Skin:     General: Skin is warm and dry.   Neurological:      General: No focal deficit present.      Mental Status: He is alert.   Psychiatric:         Thought Content: Thought content normal.         Judgment: Judgment normal.        EKG- Sinus rhythm with PVC and episode of bigeminal rhythm.          ASSESSMENT/PLAN     1. Hypertension, unspecified type  Patient started on Amlodipine-Valsartan 2 weeks ago. Blood pressure today is 149/84. Patient states systolic BP at home runs in the 170s but cannot recall the last time he took his BP. Although his BP has improved, it's not at goal. Will increase the dose of his current medication to Amlodipine-Valsartan 5-320 and follow up with the patient in 4-6 weeks. Patient encouraged to continue taking his BP at home.   Patient endorsing intermittent chest tightness and periods of feeling his heart racing. Patient having extrasystole beats on exam. EKG shows sinus rhythm with PVCs and episodes of bigeminal rhythm. Discussed CT angiogram considering no known hx of CAD vs cardio referral. Patient opted for cardiology referral which was placed today.    - amLODIPine-valsartan (EXFORGE) 5-320 MG tablet; Take 1 tablet by mouth daily  Dispense: 30 tablet; Refill: 1  - EKG 12-lead complete w/read - Clinics  - Adult Cardiology [facility] Referral; Future    Follow-Up:  - Follow up in 4-6 week(s) for blood pressure evaluation, or if symptoms worsen or fail to improve.     Options for treatment and follow-up care were reviewed with the patient. Patient engaged in the decision making process and verbalized understanding of the options discussed and agreed with the final plan.  AVS printed and given to patient.    Note by Jay Ann, RN, DNP Student     IJonathon CNP reviewed and verified the nurse practitioner (NP)  student's documentation of the patient's history. I performed  the exam and the medical decision making activities with the NP student, who was present for learning purposes.      BOBBY Sexton [facility]  Nurse Practitioners [place] [id number] [address] [address] [phone]        Note: Chart documentation was done in part with Dragon Voice Recognition software.  Although reviewed after completion, some word and grammatical errors may remain. Please contact author for any clarification or concerns.

## 2023-11-13 DIAGNOSIS — I10 HYPERTENSION, UNSPECIFIED TYPE: ICD-10-CM

## 2023-11-13 RX ORDER — AMLODIPINE AND VALSARTAN 5; 320 MG/1; MG/1
1 TABLET ORAL DAILY
Qty: 30 TABLET | Refills: 1 | Status: SHIPPED | OUTPATIENT
Start: 2023-11-13 | End: 2024-03-21

## 2023-11-13 RX ORDER — AMLODIPINE AND VALSARTAN 5; 320 MG/1; MG/1
1 TABLET ORAL DAILY
Qty: 90 TABLET | OUTPATIENT
Start: 2023-11-13

## 2023-11-14 RX ORDER — AMLODIPINE AND VALSARTAN 5; 320 MG/1; MG/1
1 TABLET ORAL DAILY
Qty: 90 TABLET | OUTPATIENT
Start: 2023-11-14

## 2023-11-20 NOTE — LETTER
11/20/2023 November 20, 2023  Re: Raul S Иван  YOB: 1970    Dear Colleague,    Thank you for your referral to the [facility]. We have been unable to schedule the referral after several contact attempts.      If you have any questions or concerns, please contact our office at Dept: [phone].        Sincerely,  [facility]

## 2023-11-22 NOTE — TELEPHONE ENCOUNTER
RECORDS RECEIVED FROM:    DATE RECEIVED:    NOTES STATUS DETAILS   OFFICE NOTE from referring provider  Internal 11-10-23 [facility]    OFFICE NOTE from other cardiologists  N/A    RECORDS from hospital/ED N/A    MEDICATION LIST Internal    GENERAL CARDIO RECORDS   (ALL APPOINTMENT TYPES)     LABS (CBC,BMP,CMP, TSH) Internal    EKG (STRIPS & REPORTS) Internal 11-10-23   MONITORS (STRIPS & REPORTS) N/A    ECHOS (IMAGES AND REPORTS) N/A    STRESS TESTS (IMAGES AND REPORTS) N/A    cMRI (IMAGES AND REPORTS) N/A    CT/CTA (IMAGES AND REPORTS) N/A

## 2023-12-18 ENCOUNTER — PRE VISIT (OUTPATIENT)
Dept: DERMATOLOGY | Facility: CLINIC | Age: 53
End: 2023-12-18

## 2023-12-18 ENCOUNTER — OFFICE VISIT (OUTPATIENT)
Dept: DERMATOLOGY | Facility: CLINIC | Age: 53
End: 2023-12-18
Payer: COMMERCIAL

## 2023-12-18 ENCOUNTER — ANCILLARY PROCEDURE (OUTPATIENT)
Dept: CT IMAGING | Facility: CLINIC | Age: 53
End: 2023-12-18
Attending: NURSE PRACTITIONER
Payer: COMMERCIAL

## 2023-12-18 DIAGNOSIS — C44.619 BASAL CELL CARCINOMA (BCC) OF LEFT SHOULDER: Primary | ICD-10-CM

## 2023-12-18 DIAGNOSIS — R10.84 ABDOMINAL PAIN, GENERALIZED: ICD-10-CM

## 2023-12-18 PROCEDURE — 74177 CT ABD & PELVIS W/CONTRAST: CPT | Performed by: STUDENT IN AN ORGANIZED HEALTH CARE EDUCATION/TRAINING PROGRAM

## 2023-12-18 PROCEDURE — 99213 OFFICE O/P EST LOW 20 MIN: CPT | Performed by: DERMATOLOGY

## 2023-12-18 RX ORDER — IOPAMIDOL 755 MG/ML
122 INJECTION, SOLUTION INTRAVASCULAR ONCE
Status: COMPLETED | OUTPATIENT
Start: 2023-12-18 | End: 2023-12-18

## 2023-12-18 RX ADMIN — IOPAMIDOL 122 ML: 755 INJECTION, SOLUTION INTRAVASCULAR at 14:48

## 2023-12-18 NOTE — LETTER
12/18/2023       RE: Raul Oates  [address]  [facility] [address]     Dear Colleague,    Thank you for referring your patient, Raul Oates, to the [facility] DERMATOLOGIC SURGERY CLINIC [place] at [facility]. Please see a copy of my visit note below.    Dermatologic Surgery Consultation Note    Dermatology Problem List:  Hx of NMSC:   - BCC, L Anterior Shoulder, s/p shave bx 10/31/23  2. Hx of Benign bx:   - Verruca Vulgaris, L Vertex Scalp, s/p shave bx 10/31/23  3. Multiple Cherry Angioma  4. Cyst    CC: Derm Problem (BCC mohs L shoulder)      Subjective: Raul Oates is a 53 year old male who presents today for Mohs micrographic surgery consultation for a recent diagnosis of skin cancer.  - Skin cancer(s): BCC  - Location(s): L Anterior Shoulder  - no prior treatment  - no other concerns today         Objective: An exam of the L anterior shoulder was performed today   - 3 cm biopsy scar well healed on shoulder.    Path report:   10/31/2023   [id number]     Final Diagnosis   A(1). L vertex of scalp:  - Verruca vulgaris - (see description)      B(2). L anterior shoulder:  - Basal cell carcinoma, nodular type - (see description)      Assessment and Plan:     1. Plan for Mohs micrographic surgery for skin cancer(s) above:  *Review lab result(s): Dermpath report   - We discussed the nature of the diagnosis/condition above. We discussed the treatment options, including the risks benefits and expectations of these options. We recommend micrographic surgery as the most effective and most tissue sparing option for treatment, and the patient agrees to proceed with this.  The patient is aware of the risks, benefits and expectations of this procedure. The patient will be scheduled for this procedure, if not already done so.  - We anticipate the following closure type: Linear closure, such as complex linear closure (CLC)    Patient was discussed  with and evaluated by attending physician Dr. Gilmore.    Scribe Disclosure:   I, VANESSA MARSHALL, am serving as a scribe; to document services personally performed by Paul Gilmore MD -based on data collection and the provider's statements to me.     Attending Attestation  I attest that the Scribe recorded the interview and exam that I personally performed.  I have reviewed the note and edited it as necessary.    Paul Gilmore M.D.  Professor  Director of Dermatologic Surgery  Department of Dermatology  [facility]

## 2023-12-18 NOTE — PROGRESS NOTES
Dermatologic Surgery Consultation Note    Dermatology Problem List:  Hx of NMSC:   - BCC, L Anterior Shoulder, s/p shave bx 10/31/23  2. Hx of Benign bx:   - Verruca Vulgaris, L Vertex Scalp, s/p shave bx 10/31/23  3. Multiple Cherry Angioma  4. Cyst    CC: Derm Problem (BCC mohs L shoulder)      Subjective: Raul Oates is a 53 year old male who presents today for Mohs micrographic surgery consultation for a recent diagnosis of skin cancer.  - Skin cancer(s): BCC  - Location(s): L Anterior Shoulder  - no prior treatment  - no other concerns today         Objective: An exam of the L anterior shoulder was performed today   - 3 cm biopsy scar well healed on shoulder.    Path report:   10/31/2023   [id number]     Final Diagnosis   A(1). L vertex of scalp:  - Verruca vulgaris - (see description)      B(2). L anterior shoulder:  - Basal cell carcinoma, nodular type - (see description)      Assessment and Plan:     1. Plan for Mohs micrographic surgery for skin cancer(s) above:  *Review lab result(s): Dermpath report   - We discussed the nature of the diagnosis/condition above. We discussed the treatment options, including the risks benefits and expectations of these options. We recommend micrographic surgery as the most effective and most tissue sparing option for treatment, and the patient agrees to proceed with this.  The patient is aware of the risks, benefits and expectations of this procedure. The patient will be scheduled for this procedure, if not already done so.  - We anticipate the following closure type: Linear closure, such as complex linear closure (CLC)    Patient was discussed with and evaluated by attending physician Dr. Gilmore.    Scribe Disclosure:   I, VANESSA MARSHALL, am serving as a scribe; to document services personally performed by Paul Gilmore MD -based on data collection and the provider's statements to me.     Attending Attestation  I attest that the Scribe recorded the interview and exam that I  personally performed.  I have reviewed the note and edited it as necessary.    Paul Gilmore M.D.  Professor  Director of Dermatologic Surgery  Department of Dermatology  [facility]

## 2023-12-18 NOTE — NURSING NOTE
Chief Complaint   Patient presents with    Derm Problem     BCC mohs SUZIE SEVERINO, RN-BSN  Dermatology Surgery  [phone]

## 2023-12-19 ENCOUNTER — TELEPHONE (OUTPATIENT)
Dept: FAMILY MEDICINE | Facility: CLINIC | Age: 53
End: 2023-12-19

## 2023-12-19 DIAGNOSIS — N20.0 KIDNEY STONE: Primary | ICD-10-CM

## 2023-12-19 DIAGNOSIS — N20.0 KIDNEY STONE: ICD-10-CM

## 2023-12-19 DIAGNOSIS — R39.9 ABNORMAL FINDING OF KIDNEY: ICD-10-CM

## 2023-12-19 RX ORDER — TAMSULOSIN HYDROCHLORIDE 0.4 MG/1
0.4 CAPSULE ORAL DAILY
Qty: 30 CAPSULE | Refills: 1 | Status: SHIPPED | OUTPATIENT
Start: 2023-12-19 | End: 2023-12-22

## 2023-12-19 NOTE — TELEPHONE ENCOUNTER
Patient called in regards to voicemail he received from the clinic about PCP ordering an US. Confirmed US was ordered. Gave patient the number for imaging scheduling.

## 2023-12-20 ENCOUNTER — TELEPHONE (OUTPATIENT)
Dept: CARDIOLOGY | Facility: CLINIC | Age: 53
End: 2023-12-20
Payer: COMMERCIAL

## 2023-12-20 DIAGNOSIS — E78.5 HYPERLIPIDEMIA LDL GOAL <160: Primary | ICD-10-CM

## 2023-12-21 ENCOUNTER — LAB (OUTPATIENT)
Dept: LAB | Facility: CLINIC | Age: 53
End: 2023-12-21
Payer: COMMERCIAL

## 2023-12-21 ENCOUNTER — PRE VISIT (OUTPATIENT)
Dept: CARDIOLOGY | Facility: CLINIC | Age: 53
End: 2023-12-21
Payer: COMMERCIAL

## 2023-12-21 ENCOUNTER — ANCILLARY PROCEDURE (OUTPATIENT)
Dept: CARDIOLOGY | Facility: CLINIC | Age: 53
End: 2023-12-21
Attending: INTERNAL MEDICINE
Payer: COMMERCIAL

## 2023-12-21 ENCOUNTER — OFFICE VISIT (OUTPATIENT)
Dept: CARDIOLOGY | Facility: CLINIC | Age: 53
End: 2023-12-21
Attending: NURSE PRACTITIONER
Payer: COMMERCIAL

## 2023-12-21 ENCOUNTER — ANCILLARY PROCEDURE (OUTPATIENT)
Dept: ULTRASOUND IMAGING | Facility: CLINIC | Age: 53
End: 2023-12-21
Attending: NURSE PRACTITIONER
Payer: COMMERCIAL

## 2023-12-21 VITALS
OXYGEN SATURATION: 94 % | BODY MASS INDEX: 42.78 KG/M2 | WEIGHT: 250.6 LBS | DIASTOLIC BLOOD PRESSURE: 72 MMHG | SYSTOLIC BLOOD PRESSURE: 123 MMHG | HEIGHT: 64 IN | HEART RATE: 53 BPM

## 2023-12-21 DIAGNOSIS — I10 HYPERTENSION, UNSPECIFIED TYPE: ICD-10-CM

## 2023-12-21 DIAGNOSIS — R07.89 FEELING OF CHEST TIGHTNESS: ICD-10-CM

## 2023-12-21 DIAGNOSIS — R39.9 ABNORMAL FINDING OF KIDNEY: ICD-10-CM

## 2023-12-21 DIAGNOSIS — I49.1: ICD-10-CM

## 2023-12-21 DIAGNOSIS — E78.5 HYPERLIPIDEMIA LDL GOAL <160: ICD-10-CM

## 2023-12-21 LAB
ALBUMIN SERPL BCG-MCNC: 4.2 G/DL (ref 3.5–5.2)
ALP SERPL-CCNC: 134 U/L (ref 40–150)
ALT SERPL W P-5'-P-CCNC: 40 U/L (ref 0–70)
ANION GAP SERPL CALCULATED.3IONS-SCNC: 9 MMOL/L (ref 7–15)
AST SERPL W P-5'-P-CCNC: 39 U/L (ref 0–45)
BILIRUB SERPL-MCNC: 1.1 MG/DL
BUN SERPL-MCNC: 22.3 MG/DL (ref 6–20)
CALCIUM SERPL-MCNC: 9.6 MG/DL (ref 8.6–10)
CHLORIDE SERPL-SCNC: 104 MMOL/L (ref 98–107)
CHOLEST SERPL-MCNC: 211 MG/DL
CREAT SERPL-MCNC: 1.18 MG/DL (ref 0.67–1.17)
DEPRECATED HCO3 PLAS-SCNC: 27 MMOL/L (ref 22–29)
EGFRCR SERPLBLD CKD-EPI 2021: 74 ML/MIN/1.73M2
FASTING STATUS PATIENT QL REPORTED: YES
GLUCOSE SERPL-MCNC: 114 MG/DL (ref 70–99)
HDLC SERPL-MCNC: 54 MG/DL
LDLC SERPL CALC-MCNC: 138 MG/DL
LDLC SERPL DIRECT ASSAY-MCNC: 140 MG/DL
NONHDLC SERPL-MCNC: 157 MG/DL
POTASSIUM SERPL-SCNC: 5.4 MMOL/L (ref 3.4–5.3)
PROT SERPL-MCNC: 7.6 G/DL (ref 6.4–8.3)
SODIUM SERPL-SCNC: 140 MMOL/L (ref 135–145)
TRIGL SERPL-MCNC: 93 MG/DL

## 2023-12-21 PROCEDURE — 80061 LIPID PANEL: CPT | Performed by: PATHOLOGY

## 2023-12-21 PROCEDURE — 93246 EXT ECG>7D<15D RECORDING: CPT

## 2023-12-21 PROCEDURE — 99213 OFFICE O/P EST LOW 20 MIN: CPT | Performed by: INTERNAL MEDICINE

## 2023-12-21 PROCEDURE — 36415 COLL VENOUS BLD VENIPUNCTURE: CPT | Performed by: PATHOLOGY

## 2023-12-21 PROCEDURE — 76770 US EXAM ABDO BACK WALL COMP: CPT | Performed by: STUDENT IN AN ORGANIZED HEALTH CARE EDUCATION/TRAINING PROGRAM

## 2023-12-21 PROCEDURE — 80053 COMPREHEN METABOLIC PANEL: CPT | Performed by: PATHOLOGY

## 2023-12-21 PROCEDURE — 99214 OFFICE O/P EST MOD 30 MIN: CPT | Mod: 25 | Performed by: INTERNAL MEDICINE

## 2023-12-21 PROCEDURE — 99000 SPECIMEN HANDLING OFFICE-LAB: CPT | Performed by: PATHOLOGY

## 2023-12-21 PROCEDURE — 83721 ASSAY OF BLOOD LIPOPROTEIN: CPT | Performed by: INTERNAL MEDICINE

## 2023-12-21 RX ORDER — ATORVASTATIN CALCIUM 20 MG/1
20 TABLET, FILM COATED ORAL DAILY
Qty: 90 TABLET | Refills: 3 | Status: SHIPPED | OUTPATIENT
Start: 2023-12-21

## 2023-12-21 ASSESSMENT — PAIN SCALES - GENERAL: PAINLEVEL: NO PAIN (0)

## 2023-12-21 NOTE — NURSING NOTE
Chief Complaint   Patient presents with    New Patient     Dr. Larios: NEW Cardiology referral d/t HTN       Vitals were taken, medications reviewed.    Joan Chu, EMT   12:25 PM

## 2023-12-21 NOTE — PATIENT INSTRUCTIONS
You were seen today in the Cardiovascular Clinic at the [facility].   Cardiology Providers you saw during your visit:    Dr. Larios    Results:   Component      Latest Ref Rng 12/21/2023  11:55 AM   Sodium      135 - 145 mmol/L 140    Potassium      3.4 - 5.3 mmol/L 5.4 (H)    Carbon Dioxide (CO2)      22 - 29 mmol/L 27    Anion Gap      7 - 15 mmol/L 9    Urea Nitrogen      6.0 - 20.0 mg/dL 22.3 (H)    Creatinine      0.67 - 1.17 mg/dL 1.18 (H)    GFR Estimate      >60 mL/min/1.73m2 74    Calcium      8.6 - 10.0 mg/dL 9.6    Chloride      98 - 107 mmol/L 104    Glucose      70 - 99 mg/dL 114 (H)    Alkaline Phosphatase      40 - 150 U/L 134    AST      0 - 45 U/L 39    ALT      0 - 70 U/L 40    Protein Total      6.4 - 8.3 g/dL 7.6    Albumin      3.5 - 5.2 g/dL 4.2    Bilirubin Total      <=1.2 mg/dL 1.1    Cholesterol      <200 mg/dL 211 (H)    Triglycerides      <150 mg/dL 93    HDL Cholesterol      >=40 mg/dL 54    LDL Cholesterol Calculated      <=100 mg/dL 138 (H)    Non HDL Cholesterol      <130 mg/dL 157 (H)    Patient Fasting? Yes       Legend:  (H) High    Follow-up and Recommendations:   Start Atorvostatin 20 mg Daily  Labs in 3 months   Zio Patch placed today  CTA in 2-3 weeks    For emergencies call 911.     If you have any questions regarding your visit please contact your care team:     Zara Briscoe  [facility]  Cardiology RN Care Coordinator    Appointment scheduling or nurse questions: [phone]    On Call Cardiologist for after hours or on weekends: [phone]    option #4    If you need a medication refill please contact your pharmacy.  Please allow 3 business days for your refill to be completed.    As always, thank you for trusting us with your health care needs!

## 2023-12-21 NOTE — PROGRESS NOTES
"  I had the privilege to evaluate and examine Mr Raul Oates, who is a 53 yr patient with a history of arterial hypertension and hypercholesterolemia, prediabetes.  Patient does not know his medical family history because he has been adopted.     Patient is tolerating his antihypertensive combination the Amlodipine-Valsartan combination pill without side effects.     Recently patient has godfrey intermittent chest tightness in the left upper chest at rest and during effort. These episodes of chest pain last normally 3 to 4 min.     Patient has also noticed that he has periods of palpitations.     He denies any shortness of breath with these episodes.      He denies shortness of breath, intermittent claudication.        PAST MEDICAL HISTORY:  Past Medical History        Past Medical History:   Diagnosis Date    Epistaxis      Groin mass      Hip pain      Hypertension              CURRENT MEDICATIONS:  Current Outpatient Prescriptions          Current Outpatient Medications   Medication Sig Dispense Refill    amLODIPine-valsartan (EXFORGE) 5-320 MG tablet Take 1 tablet by mouth daily 30 tablet 1    atorvastatin (LIPITOR) 20 MG tablet Take 1 tablet (20 mg) by mouth daily (Patient not taking: Reported on 12/22/2023) 90 tablet 3    tamsulosin (FLOMAX) 0.4 MG capsule TAKE 1 CAPSULE(0.4 MG) BY MOUTH DAILY 90 capsule 0            PAST SURGICAL HISTORY:  Past Surgical History         Past Surgical History:   Procedure Laterality Date    ARTHROPLASTY HIP Left 10/12/2015     Procedure: ARTHROPLASTY HIP;  Surgeon: Hira Reilly MD;  Location: [facility] OR    ORTHOPEDIC SURGERY         right leg \"bone repair\"            ALLERGIES          Allergies   Allergen Reactions    No Known Allergies           FAMILY HISTORY:  Family History         Family History   Problem Relation Age of Onset    Unknown/Adopted Mother      Coronary Artery Disease Maternal Grandfather      Unknown/Adopted Father              SOCIAL HISTORY:  Social " "History            Socioeconomic History    Marital status: Single       Spouse name: None    Number of children: None    Years of education: None    Highest education level: None   Tobacco Use    Smoking status: Never    Smokeless tobacco: Never   Substance and Sexual Activity    Alcohol use: No       Alcohol/week: 0.0 standard drinks of alcohol    Drug use: No    Sexual activity: Not Currently   Social History Narrative     Works in [employer] at the [employer]      Social Determinants of Health           Interpersonal Safety: Low Risk  (12/22/2023)     Interpersonal Safety      Do you feel physically and emotionally safe where you currently live?: Yes      Within the past 12 months, have you been hit, slapped, kicked or otherwise physically hurt by someone?: No      Within the past 12 months, have you been humiliated or emotionally abused in other ways by your partner or ex-partner?: No         ROS:   Constitutional: No fever, chills, or sweats. No weight gain/loss   ENT: No visual disturbance, ear ache, epistaxis, sore throat  Allergies/Immunologic: Negative.   Respiratory: No cough, hemoptysia  Cardiovascular: As per HPI  GI: No nausea, vomiting, hematemesis, melena, or hematochezia  : No urinary frequency, dysuria, or hematuria  Integument: Negative  Psychiatric: Negative  Neuro: Negative  Endocrinology: Negative   Musculoskeletal: Negative     EXAM:  /72 (BP Location: Right arm, Patient Position: Sitting, Cuff Size: Adult Large)   Pulse 53   Ht 1.638 m (5' 4.49\")   Wt 113.7 kg (250 lb 9.6 oz)   SpO2 94%   BMI 42.37 kg/m    In general, the patient is a pleasant male in no apparent distress.    HEENT: NC/AT.  PERRLA.  EOMI.  Sclerae white, not injected.  Nares clear.  Pharynx without erythema or exudate.  Dentition intact.    Neck: No adenopathy.  No thyromegaly. Carotids +4/4 bilaterally without bruits.  No jugular venous distension.   Heart: RRR. Normal S1, S2 splits " physiologically. No murmur, rub, click, or gallop. The PMI is in the 5th ICS in the midclavicular line. There is no heave.    Lungs: CTA.  No ronchi, wheezes, rales.  No dullness to percussion.   Abdomen: Soft, nontender, nondistended. No organomegaly.  No bruits.   Extremities: No clubbing, cyanosis, or edema.  The pulses are +4/4 at the radial, brachial, femoral, popliteal, DP, and PT sites bilaterally.  No bruits are noted.  Neurologic: Alert and oriented to person/place/time, normal speech, gait and affect  Skin: No petechiae, purpura or rash.     Labs:  LIPID RESULTS:        Lab Results   Component Value Date     CHOL 211 (H) 12/21/2023     CHOL 208 (H) 10/12/2020     HDL 54 12/21/2023     HDL 71 10/12/2020      (H) 12/21/2023      (H) 12/21/2023      (H) 10/12/2020     TRIG 93 12/21/2023     TRIG 62 10/12/2020     NHDL 157 (H) 12/21/2023     NHDL 137 (H) 10/12/2020         LIVER ENZYME RESULTS:        Lab Results   Component Value Date     AST 39 12/21/2023     AST 32 10/12/2020     ALT 40 12/21/2023     ALT 48 10/12/2020         CBC RESULTS:        Lab Results   Component Value Date     WBC 8.8 10/27/2023     WBC 7.9 06/02/2021     RBC 5.31 10/27/2023     RBC 5.47 06/02/2021     HGB 15.2 10/27/2023     HGB 15.6 06/02/2021     HCT 45.5 10/27/2023     HCT 47.0 06/02/2021     MCV 86 10/27/2023     MCV 86 06/02/2021     MCH 28.6 10/27/2023     MCH 28.5 06/02/2021     MCHC 33.4 10/27/2023     MCHC 33.2 06/02/2021     RDW 14.0 10/27/2023     RDW 13.8 06/02/2021      10/27/2023      06/02/2021         BMP RESULTS:        Lab Results   Component Value Date      12/21/2023      10/12/2020     POTASSIUM 5.4 (H) 12/21/2023     POTASSIUM 4.6 10/12/2020     CHLORIDE 104 12/21/2023     CHLORIDE 102 10/12/2020     CO2 27 12/21/2023     CO2 28 10/12/2020     ANIONGAP 9 12/21/2023     ANIONGAP 5 10/12/2020      (H) 12/21/2023      (H) 10/12/2020     BUN 22.3 (H)  12/21/2023     BUN 12 10/12/2020     CR 1.18 (H) 12/21/2023     CR 0.78 10/12/2020     GFRESTIMATED 74 12/21/2023     GFRESTIMATED >90 10/12/2020     GFRESTBLACK >90 10/12/2020     TRISH 9.6 12/21/2023     TRISH 9.3 10/12/2020         A1C RESULTS:        Lab Results   Component Value Date     A1C 6.3 (H) 10/27/2023     A1C 5.8 (H) 06/02/2021         INR RESULTS:        Lab Results   Component Value Date     INR 0.99 03/02/2020     INR 1.08 10/06/2015         Procedures:        Assessment and Plan:      I discussed the results with patient.  I discussed the heart healthy diet and lifestyle.     I discussed following items:  - Start Atorvostatin 20 mg Daily  Labs in 3 months      - Because of the palpitations , I ordered Zio Patch - placed today     - Because of his repetitive chest pain, presence of several cardiovascular  risk factors, family medical history unknown I ordered a coronary CT angiogram within 2-3 weeks.     Once we have all these results - I will tailor further his therapy     Jonathon Larios MD, PhD  Professor of Medicine  Division of Cardiology               CC  Patient Care Team:  Jonathon Alas, APRN CNP as PCP - General  [facility], Ceasar Wagoner MD as MD (Family Medicine - Sports Medicine)  Ciaran Meyer MD as MD (Urology)  Xochitl Nolan NP as Assigned PCP  Dylan Marquez MD as MD (Dermatology)  Jacob Torres MD as MD (Urology)  Paul Gilmore MD as Assigned Surgical Provider  JONATHON ALAS

## 2023-12-21 NOTE — PROGRESS NOTES
"Raul is a 53 year old who is being evaluated via a billable telephone visit.      What phone number would you like to be contacted at? [phone]  How would you like to obtain your AVS? [place]  Distant Location (provider location):  On-site        Subjective   Raul is a 53 year old, presenting for the following health issues: Results.      HPI  53-year-old male with past medical history of HTN, prediabetes, HLD, bilateral hip OA presents to telephone appointment to discuss imaging results.  Patient had CT abdomen pelvis completed due to complaints of intermittent generalized abdominal pain. Patient denies acute concerns/symptoms today. He completed his cardiology referral yesterday. He had a ZIO patch placed, CT angiogram ordered (results not available yet), and was started on atorvastatin.           Review of Systems   Constitutional, HEENT, cardiovascular, pulmonary, gi and gu systems are negative, except as otherwise noted.    Current Outpatient Medications   Medication    amLODIPine-valsartan (EXFORGE) 5-320 MG tablet    atorvastatin (LIPITOR) 20 MG tablet    tamsulosin (FLOMAX) 0.4 MG capsule     Current Facility-Administered Medications   Medication    lidocaine (PF) (XYLOCAINE) 1 % injection 4 mL    triamcinolone (KENALOG-40) injection 40 mg     Past Medical History:   Diagnosis Date    Epistaxis     Groin mass     Hip pain     Hypertension        Past Surgical History:   Procedure Laterality Date    ARTHROPLASTY HIP Left 10/12/2015    Procedure: ARTHROPLASTY HIP;  Surgeon: Hira Reilly MD;  Location: [facility]    ORTHOPEDIC SURGERY      right leg \"bone repair\"       Family History   Problem Relation Age of Onset    Unknown/Adopted Mother     Coronary Artery Disease Maternal Grandfather     Unknown/Adopted Father        Social History     Tobacco Use    Smoking status: Never    Smokeless tobacco: Never   Substance Use Topics    Alcohol use: No     Alcohol/week: 0.0 standard drinks of alcohol           "   Objective             Physical Exam   healthy, alert, and no distress  PSYCH: Alert and oriented times 3; coherent speech, normal   rate and volume, able to articulate logical thoughts, able   to abstract reason, no tangential thoughts, no hallucinations   or delusions  His affect is normal  RESP: No cough, no audible wheezing, able to talk in full sentences  Remainder of exam unable to be completed due to telephone visits    EXAMINATION: CT ABDOMEN PELVIS W CONTRAST, 12/18/2023 3:00 PM     INDICATION: intermittent generalized abdominal pain, exam today with  distention; Abdominal pain, generalized     COMPARISON STUDY: CT 3/2/2020     TECHNIQUE: CT scan of the abdomen and pelvis was performed on  multidetector CT scanner using volumetric acquisition technique and  images were reconstructed in multiple planes with variable thickness  and reviewed on dedicated workstations.      CONTRAST: Isovue 370  122 mls injected IV without oral contrast     CT scan radiation dose is optimized to minimum requisite dose using  automated dose modulation techniques.     FINDINGS:     Lower thorax: Normal.     Liver: No mass. No intrahepatic biliary ductal dilation.     Biliary System: Normal gallbladder. No extrahepatic biliary ductal  dilation.     Spleen: Normal.     Pancreas: No mass or pancreatic ductal dilation.     Adrenal glands: No mass or nodules     Kidneys: Right upper pole 1.7 cm indeterminate hypodensity with  attenuation greater than expected for simple cysts. Additional  probable cysts bilaterally. A 1.2 cm left lower pole collecting system  calculus. No hydronephrosis. There is also a 0.8 cm calculus within  the urinary bladder at the left ureterovesical junction.     Gastrointestinal tract :Normal appendix. Normal caliber small bowel.   Colonic diverticulosis with no diverticulitis.      Retroperitoneum: Patent major abdominal vasculature. No significant  lymphadenopathy.     Pelvis: Bladder is nondistended.  Bladder calculus as described above.  Prostate is obscured by streak artifact from left hip arthroplasty.     Osseous structures: Multilevel degenerative changes of the visualized  spine.  Left hip arthroplasty. Marked degenerative changes in the  right hip.      Soft tissues: Within normal limits.                                                                      IMPRESSION:  1.  Calculus at the left ureterovesical junction measuring 8 mm. No  hydronephrosis. Additional nonobstructing 12 mm calculus in the left  lower pole.  2.  Bilateral renal cysts. Additional indeterminate hypodensity in the  right upper pole. Consider ultrasound for further evaluation..     EXAMINATION: US RENAL COMPLETE NON-VASCULAR, 12/21/2023 4:47 PM      COMPARISON: CT 12/18/2023     HISTORY: CT demonstrated Right upper pole 1.7 cm indeterminate  hypodensity with attenuation greater than expected for simple cysts.  Need further eval; Abnormal finding of kidney     FINDINGS:     Right kidney: Measures 11.8 cm in length. Parenchyma is of normal  thickness and echogenicity. Cysts are noted in the right kidney  measuring 1.8 x 1.4 x 1.3 cm in the mid to upper pole and 1.9 x 1.7 x  1.6 cm in the mid to lower pole. On cine clips a probable third cyst  is also noted. These likely correspond to findings on recent CT scan.  No suspicious mass. No hydronephrosis.     Left kidney: Measures 12.0 cm in length. Parenchyma is of normal  thickness and echogenicity. No focal mass. No hydronephrosis. The  previously described calculus in the left lower pole likely remains  present, though is not well evaluated.     Bladder: Nondistended. Ureterovesical calculus is noted, similar to  recent CT scan.                                                                      IMPRESSION:     1. Right renal cysts corresponding to finding on recent CT scan. No  suspicious mass is seen.  2. Calculus is noted at the bladder corresponding to previously  described  ureterovesical junction calculus.     ROSE MARY QUINTANILLA, DO     Assessment/Plan  1. Kidney stone  This is a pleasant 53-year-old male with past medical history HTN, prediabetes, HLD, and hematuria male presents for telephone appointment to discuss recent CT and ultrasound.  Imaging demonstrated renal cysts and nephrolithiasis. Patient started on tamsulosin. Encouraged increased hydration and will resend URO consult considering 12mm stone. Patient advised to discuss future imaging to monitor renal cysts with Uro.  - Adult Urology  Referral; Future      Follow-up as needed or if symptoms worsen/fail to improve.    All questions/concerns addressed. Patient stated understanding/agreement to plan of care.    BOBBY Sexton, CNP  [employer]      Phone call duration: 6:08 minutes

## 2023-12-21 NOTE — PROGRESS NOTES
Per Dr. Larios, patient to have Zio monitor placed.  Diagnosis: hypertension, chest tightness, premature supraventricular beat  Monitor placed: Yes  Patient Instructed: Yes  Patient verbalized understanding: Yes  Holter #[id number]    Monitor was placed by RANDI Goff  2:16 PM

## 2023-12-21 NOTE — TELEPHONE ENCOUNTER
TAMSULOSIN 0.4MG CAPSULES       Last Written Prescription Date:  12-19-23  Last Fill Quantity: 30,   # refills: 1  Last Office Visit : 11-10-23  Future Office visit:  12-22-23    Routing refill request to provider for review/approval because:  Pharm: pt requesting 90 day     Last rx 30 day :1RF  Appt 12-22-23

## 2023-12-21 NOTE — LETTER
12/21/2023      RE: aRul Oates  [address]  [facility] [address]       Dear Colleague,    Thank you for the opportunity to participate in the care of your patient, Raul Oates, at the [facility] [place] at [facility]. Please see a copy of my visit note below.      I had the privilege to evaluate and examine Mr Raul Oates, who is a 53 yr patient with a history of arterial hypertension and hypercholesterolemia, prediabetes.  Patient does not know his medical family history because he has been adopted.     Patient is tolerating his antihypertensive combination the Amlodipine-Valsartan combination pill without side effects.     Recently patient has godfrey intermittent chest tightness in the left upper chest at rest and during effort. These episodes of chest pain last normally 3 to 4 min.     Patient has also noticed that he has periods of palpitations.     He denies any shortness of breath with these episodes.      He denies shortness of breath, intermittent claudication.        PAST MEDICAL HISTORY:  Past Medical History        Past Medical History:   Diagnosis Date    Epistaxis      Groin mass      Hip pain      Hypertension              CURRENT MEDICATIONS:  Current Outpatient Prescriptions          Current Outpatient Medications   Medication Sig Dispense Refill    amLODIPine-valsartan (EXFORGE) 5-320 MG tablet Take 1 tablet by mouth daily 30 tablet 1    atorvastatin (LIPITOR) 20 MG tablet Take 1 tablet (20 mg) by mouth daily (Patient not taking: Reported on 12/22/2023) 90 tablet 3    tamsulosin (FLOMAX) 0.4 MG capsule TAKE 1 CAPSULE(0.4 MG) BY MOUTH DAILY 90 capsule 0            PAST SURGICAL HISTORY:  Past Surgical History         Past Surgical History:   Procedure Laterality Date    ARTHROPLASTY HIP Left 10/12/2015     Procedure: ARTHROPLASTY HIP;  Surgeon: Hira Reilly MD;  [facility]    ORTHOPEDIC SURGERY          "right leg \"bone repair\"            ALLERGIES          Allergies   Allergen Reactions    No Known Allergies           FAMILY HISTORY:  Family History         Family History   Problem Relation Age of Onset    Unknown/Adopted Mother      Coronary Artery Disease Maternal Grandfather      Unknown/Adopted Father              SOCIAL HISTORY:  Social History            Socioeconomic History    Marital status: Single       Spouse name: None    Number of children: None    Years of education: None    Highest education level: None   Tobacco Use    Smoking status: Never    Smokeless tobacco: Never   Substance and Sexual Activity    Alcohol use: No       Alcohol/week: 0.0 standard drinks of alcohol    Drug use: No    Sexual activity: Not Currently   Social History Narrative     Works in [employer] at the [employer]      Social Determinants of Health           Interpersonal Safety: Low Risk  (12/22/2023)     Interpersonal Safety      Do you feel physically and emotionally safe where you currently live?: Yes      Within the past 12 months, have you been hit, slapped, kicked or otherwise physically hurt by someone?: No      Within the past 12 months, have you been humiliated or emotionally abused in other ways by your partner or ex-partner?: No         ROS:   Constitutional: No fever, chills, or sweats. No weight gain/loss   ENT: No visual disturbance, ear ache, epistaxis, sore throat  Allergies/Immunologic: Negative.   Respiratory: No cough, hemoptysia  Cardiovascular: As per HPI  GI: No nausea, vomiting, hematemesis, melena, or hematochezia  : No urinary frequency, dysuria, or hematuria  Integument: Negative  Psychiatric: Negative  Neuro: Negative  Endocrinology: Negative   Musculoskeletal: Negative     EXAM:  /72 (BP Location: Right arm, Patient Position: Sitting, Cuff Size: Adult Large)   Pulse 53   Ht 1.638 m (5' 4.49\")   Wt 113.7 kg (250 lb 9.6 oz)   SpO2 94%   BMI 42.37 kg/m    In general, " the patient is a pleasant male in no apparent distress.    HEENT: NC/AT.  PERRLA.  EOMI.  Sclerae white, not injected.  Nares clear.  Pharynx without erythema or exudate.  Dentition intact.    Neck: No adenopathy.  No thyromegaly. Carotids +4/4 bilaterally without bruits.  No jugular venous distension.   Heart: RRR. Normal S1, S2 splits physiologically. No murmur, rub, click, or gallop. The PMI is in the 5th ICS in the midclavicular line. There is no heave.    Lungs: CTA.  No ronchi, wheezes, rales.  No dullness to percussion.   Abdomen: Soft, nontender, nondistended. No organomegaly.  No bruits.   Extremities: No clubbing, cyanosis, or edema.  The pulses are +4/4 at the radial, brachial, femoral, popliteal, DP, and PT sites bilaterally.  No bruits are noted.  Neurologic: Alert and oriented to person/place/time, normal speech, gait and affect  Skin: No petechiae, purpura or rash.     Labs:  LIPID RESULTS:        Lab Results   Component Value Date     CHOL 211 (H) 12/21/2023     CHOL 208 (H) 10/12/2020     HDL 54 12/21/2023     HDL 71 10/12/2020      (H) 12/21/2023      (H) 12/21/2023      (H) 10/12/2020     TRIG 93 12/21/2023     TRIG 62 10/12/2020     NHDL 157 (H) 12/21/2023     NHDL 137 (H) 10/12/2020         LIVER ENZYME RESULTS:        Lab Results   Component Value Date     AST 39 12/21/2023     AST 32 10/12/2020     ALT 40 12/21/2023     ALT 48 10/12/2020         CBC RESULTS:        Lab Results   Component Value Date     WBC 8.8 10/27/2023     WBC 7.9 06/02/2021     RBC 5.31 10/27/2023     RBC 5.47 06/02/2021     HGB 15.2 10/27/2023     HGB 15.6 06/02/2021     HCT 45.5 10/27/2023     HCT 47.0 06/02/2021     MCV 86 10/27/2023     MCV 86 06/02/2021     MCH 28.6 10/27/2023     MCH 28.5 06/02/2021     MCHC 33.4 10/27/2023     MCHC 33.2 06/02/2021     RDW 14.0 10/27/2023     RDW 13.8 06/02/2021      10/27/2023      06/02/2021         BMP RESULTS:        Lab Results   Component  Value Date      12/21/2023      10/12/2020     POTASSIUM 5.4 (H) 12/21/2023     POTASSIUM 4.6 10/12/2020     CHLORIDE 104 12/21/2023     CHLORIDE 102 10/12/2020     CO2 27 12/21/2023     CO2 28 10/12/2020     ANIONGAP 9 12/21/2023     ANIONGAP 5 10/12/2020      (H) 12/21/2023      (H) 10/12/2020     BUN 22.3 (H) 12/21/2023     BUN 12 10/12/2020     CR 1.18 (H) 12/21/2023     CR 0.78 10/12/2020     GFRESTIMATED 74 12/21/2023     GFRESTIMATED >90 10/12/2020     GFRESTBLACK >90 10/12/2020     TRISH 9.6 12/21/2023     TRISH 9.3 10/12/2020         A1C RESULTS:        Lab Results   Component Value Date     A1C 6.3 (H) 10/27/2023     A1C 5.8 (H) 06/02/2021         INR RESULTS:        Lab Results   Component Value Date     INR 0.99 03/02/2020     INR 1.08 10/06/2015         Procedures:        Assessment and Plan:      I discussed the results with patient.  I discussed the heart healthy diet and lifestyle.     I discussed following items:  - Start Atorvostatin 20 mg Daily  Labs in 3 months      - Because of the palpitations , I ordered Zio Patch - placed today     - Because of his repetitive chest pain, presence of several cardiovascular  risk factors, family medical history unknown I ordered a coronary CT angiogram within 2-3 weeks.     Once we have all these results - I will tailor further his therapy     Jonathon Larios MD, PhD  Professor of Medicine  Division of Cardiology               CC  Patient Care Team:  Jonathon Alas, APRN CNP as PCP - General  [facility], Ceasar Wagoner MD as MD (Family Medicine - Sports Medicine)  Ciaran Meyer MD as MD (Urology)  Xochitl Nolan NP as Assigned PCP  Dylan Marquez MD as MD (Dermatology)  Jacob Torres MD as MD (Urology)  Paul Gilmore MD as Assigned Surgical Provider  JONATHON ALAS

## 2023-12-22 ENCOUNTER — VIRTUAL VISIT (OUTPATIENT)
Dept: FAMILY MEDICINE | Facility: CLINIC | Age: 53
End: 2023-12-22
Payer: COMMERCIAL

## 2023-12-22 DIAGNOSIS — N20.0 KIDNEY STONE: Primary | ICD-10-CM

## 2023-12-22 RX ORDER — TAMSULOSIN HYDROCHLORIDE 0.4 MG/1
0.4 CAPSULE ORAL DAILY
Qty: 90 CAPSULE | Refills: 0 | Status: SHIPPED | OUTPATIENT
Start: 2023-12-22

## 2023-12-28 NOTE — TELEPHONE ENCOUNTER
MEDICAL RECORDS REQUEST   [facility]  [address] [place] [address]  PHONE: [phone]  Fax: [phone]        FUTURE VISIT INFORMATION                                                   Raul Oates, : 1970 scheduled for future visit at [facility]    APPOINTMENT INFORMATION:  Date: 2024  Provider:  Jacob Torres MD  Reason for Visit/Diagnosis: Acute Kidney Stones    REFERRAL INFORMATION:  Referring provider:  Jonathon Alas APRN CNP      RECORDS REQUESTED FOR VISIT                                                     NOTES  STATUS/DETAILS   OFFICE NOTE from referring provider  2023 -- Jonathon Alas APRN CNP   MEDICATION LIST  yes   LABS     URINALYSIS (UA)  yes   images  yes, 2023 -- US RENAL  2023 -- CT ABD PELVIS  2022 -- XR PELVIS     PRE-VISIT CHECKLIST      Joint diagnostic appointment coordinated correctly          (ensure right order & amount of time) Yes   RECORD COLLECTION COMPLETE Yes

## 2023-12-31 NOTE — CONFIDENTIAL NOTE
"HPI:    I had the privilege to evaluate and examine Mr Raul Oates, who is a 53 yr patient with a history of arterial hypertension and hypercholesterolemia, prediabetes.  Patient does not know his medical family history because he has been adopted.    Patient is tolerating his antihypertensive combination the Amlodipine-Valsartan combination pill without side effects.    Recently patient has godfrey intermittent chest tightness in the left upper chest at rest and during effort. These episodes of chest pain last normally 3 to 4 min.    Patient has also noticed that he has periods of palpitations.    He denies any shortness of breath with these episodes.     He denies shortness of breath, intermittent claudication.       PAST MEDICAL HISTORY:  Past Medical History:   Diagnosis Date    Epistaxis     Groin mass     Hip pain     Hypertension        CURRENT MEDICATIONS:  Current Outpatient Medications   Medication Sig Dispense Refill    amLODIPine-valsartan (EXFORGE) 5-320 MG tablet Take 1 tablet by mouth daily 30 tablet 1    atorvastatin (LIPITOR) 20 MG tablet Take 1 tablet (20 mg) by mouth daily (Patient not taking: Reported on 12/22/2023) 90 tablet 3    tamsulosin (FLOMAX) 0.4 MG capsule TAKE 1 CAPSULE(0.4 MG) BY MOUTH DAILY 90 capsule 0       PAST SURGICAL HISTORY:  Past Surgical History:   Procedure Laterality Date    ARTHROPLASTY HIP Left 10/12/2015    Procedure: ARTHROPLASTY HIP;  Surgeon: Hira Reilly MD;  [facility]    ORTHOPEDIC SURGERY      right leg \"bone repair\"       ALLERGIES     Allergies   Allergen Reactions    No Known Allergies        FAMILY HISTORY:  Family History   Problem Relation Age of Onset    Unknown/Adopted Mother     Coronary Artery Disease Maternal Grandfather     Unknown/Adopted Father        SOCIAL HISTORY:  Social History     Socioeconomic History    Marital status: Single     Spouse name: None    Number of children: None    Years of education: None    Highest education level: " "None   Tobacco Use    Smoking status: Never    Smokeless tobacco: Never   Substance and Sexual Activity    Alcohol use: No     Alcohol/week: 0.0 standard drinks of alcohol    Drug use: No    Sexual activity: Not Currently   Social History Narrative    Works in [employer] at the [employer]     Social Determinants of Health     Interpersonal Safety: Low Risk  (12/22/2023)    Interpersonal Safety     Do you feel physically and emotionally safe where you currently live?: Yes     Within the past 12 months, have you been hit, slapped, kicked or otherwise physically hurt by someone?: No     Within the past 12 months, have you been humiliated or emotionally abused in other ways by your partner or ex-partner?: No       ROS:   Constitutional: No fever, chills, or sweats. No weight gain/loss   ENT: No visual disturbance, ear ache, epistaxis, sore throat  Allergies/Immunologic: Negative.   Respiratory: No cough, hemoptysia  Cardiovascular: As per HPI  GI: No nausea, vomiting, hematemesis, melena, or hematochezia  : No urinary frequency, dysuria, or hematuria  Integument: Negative  Psychiatric: Negative  Neuro: Negative  Endocrinology: Negative   Musculoskeletal: Negative    EXAM:  /72 (BP Location: Right arm, Patient Position: Sitting, Cuff Size: Adult Large)   Pulse 53   Ht 1.638 m (5' 4.49\")   Wt 113.7 kg (250 lb 9.6 oz)   SpO2 94%   BMI 42.37 kg/m    In general, the patient is a pleasant male in no apparent distress.    HEENT: NC/AT.  PERRLA.  EOMI.  Sclerae white, not injected.  Nares clear.  Pharynx without erythema or exudate.  Dentition intact.    Neck: No adenopathy.  No thyromegaly. Carotids +4/4 bilaterally without bruits.  No jugular venous distension.   Heart: RRR. Normal S1, S2 splits physiologically. No murmur, rub, click, or gallop. The PMI is in the 5th ICS in the midclavicular line. There is no heave.    Lungs: CTA.  No ronchi, wheezes, rales.  No dullness to percussion. "   Abdomen: Soft, nontender, nondistended. No organomegaly.  No bruits.   Extremities: No clubbing, cyanosis, or edema.  The pulses are +4/4 at the radial, brachial, femoral, popliteal, DP, and PT sites bilaterally.  No bruits are noted.  Neurologic: Alert and oriented to person/place/time, normal speech, gait and affect  Skin: No petechiae, purpura or rash.    Labs:  LIPID RESULTS:  Lab Results   Component Value Date    CHOL 211 (H) 12/21/2023    CHOL 208 (H) 10/12/2020    HDL 54 12/21/2023    HDL 71 10/12/2020     (H) 12/21/2023     (H) 12/21/2023     (H) 10/12/2020    TRIG 93 12/21/2023    TRIG 62 10/12/2020    NHDL 157 (H) 12/21/2023    NHDL 137 (H) 10/12/2020       LIVER ENZYME RESULTS:  Lab Results   Component Value Date    AST 39 12/21/2023    AST 32 10/12/2020    ALT 40 12/21/2023    ALT 48 10/12/2020       CBC RESULTS:  Lab Results   Component Value Date    WBC 8.8 10/27/2023    WBC 7.9 06/02/2021    RBC 5.31 10/27/2023    RBC 5.47 06/02/2021    HGB 15.2 10/27/2023    HGB 15.6 06/02/2021    HCT 45.5 10/27/2023    HCT 47.0 06/02/2021    MCV 86 10/27/2023    MCV 86 06/02/2021    MCH 28.6 10/27/2023    MCH 28.5 06/02/2021    MCHC 33.4 10/27/2023    MCHC 33.2 06/02/2021    RDW 14.0 10/27/2023    RDW 13.8 06/02/2021     10/27/2023     06/02/2021       BMP RESULTS:  Lab Results   Component Value Date     12/21/2023     10/12/2020    POTASSIUM 5.4 (H) 12/21/2023    POTASSIUM 4.6 10/12/2020    CHLORIDE 104 12/21/2023    CHLORIDE 102 10/12/2020    CO2 27 12/21/2023    CO2 28 10/12/2020    ANIONGAP 9 12/21/2023    ANIONGAP 5 10/12/2020     (H) 12/21/2023     (H) 10/12/2020    BUN 22.3 (H) 12/21/2023    BUN 12 10/12/2020    CR 1.18 (H) 12/21/2023    CR 0.78 10/12/2020    GFRESTIMATED 74 12/21/2023    GFRESTIMATED >90 10/12/2020    GFRESTBLACK >90 10/12/2020    TRISH 9.6 12/21/2023    TRISH 9.3 10/12/2020        A1C RESULTS:  Lab Results   Component Value Date     A1C 6.3 (H) 10/27/2023    A1C 5.8 (H) 06/02/2021       INR RESULTS:  Lab Results   Component Value Date    INR 0.99 03/02/2020    INR 1.08 10/06/2015       Procedures:      Assessment and Plan:     I discussed the results with patient.  I discussed the heart healthy diet and lifestyle.    I discussed following items:  - Start Atorvostatin 20 mg Daily  Labs in 3 months     - Because of the palpitations , I ordered Zio Patch - placed today    - Because of his repetitive chest pain, presence of several cardiovascular  risk factors, family medical history unknown I ordered a coronary CT angiogram within 2-3 weeks.    Once we have all these results - I will tailor further his therapy    Jonathon Larios MD, PhD  Professor of Medicine  Division of Cardiology           CC  Patient Care Team:  Jonathon Alas, APRN CNP as PCP -   [facility], Ceasar Wagoner MD as MD (Family Medicine - Sports Medicine)  Ciaran Meyer MD as MD (Urology)  Xochitl Nolan NP as Assigned PCP  Dylan Marquez MD as MD (Dermatology)  Jacob Torres MD as MD (Urology)  Paul Gilmore MD as Assigned Surgical Provider  JONATHON ALAS

## 2024-01-08 ENCOUNTER — TELEPHONE (OUTPATIENT)
Dept: DERMATOLOGY | Facility: CLINIC | Age: 54
End: 2024-01-08

## 2024-01-08 ENCOUNTER — OFFICE VISIT (OUTPATIENT)
Dept: DERMATOLOGY | Facility: CLINIC | Age: 54
End: 2024-01-08
Payer: COMMERCIAL

## 2024-01-08 VITALS — HEART RATE: 53 BPM | SYSTOLIC BLOOD PRESSURE: 182 MMHG | DIASTOLIC BLOOD PRESSURE: 77 MMHG

## 2024-01-08 DIAGNOSIS — C44.619 BASAL CELL CARCINOMA (BCC) OF SKIN OF LEFT UPPER EXTREMITY INCLUDING SHOULDER: ICD-10-CM

## 2024-01-08 PROCEDURE — 17313 MOHS 1 STAGE T/A/L: CPT | Mod: GC | Performed by: DERMATOLOGY

## 2024-01-08 PROCEDURE — 12032 INTMD RPR S/A/T/EXT 2.6-7.5: CPT | Mod: GC | Performed by: DERMATOLOGY

## 2024-01-08 NOTE — PROGRESS NOTES
[facility] [place] Procedure Note      Date of Service:  Jan 8, 2024  Surgery: Mohs micrographic surgery    Case 1  Repair Type: Intermediate  Repair Size: 7.0 cm  Suture Material: 3-0 Vicryl / 4-0 Monocryl   Tumor Type: Basal Cell Carcinoma   Location: L Anterior Shoulder  Derm-Path Accession #: [id number]   PreOp Size: 2.5 x 1.9 cm  PostOp Size: 3.0 x 2.5 cm  Mohs Accession #: [id number]  Level of Defect: Fat      Procedure:  We discussed the principles of treatment and most likely complications including scarring, bleeding, infection, swelling, pain, crusting, nerve damage, large wound,  incomplete excision, wound dehiscence,  nerve damage, recurrence, and a second procedure may be recommended to obtain the best cosmetic or functional result.    Informed consent was obtained and the patient underwent the procedure as follows:  The patient was placed supine on the operating table.  The cancer was identified, outlined with a marker, and verified by the patient.  The entire surgical field was prepped with Hibiclens.  The surgical site was anesthetized using Lidocaine 1% with epi 1 : 100,000.      The area of clinically apparent tumor was not debulked. The layer of tissue was then surgically excised using a #15 blade and was then transferred onto a specimen sheet maintaining the orientation of the specimen. Hemostasis was obtained using monopolar electrodessication. The wound site was then covered with a dressing while the tissue samples were processed for examination.    The excised tissue was transported to the Mohs histology laboratory maintaining the tissue orientation.  The tissue specimen was relaxed so that the entire surgical margin was in a a single horizontal plane for sectioning and inked for precise mapping.  A precise reference map was drawn to reflect the sectioning of the specimen, colored inking of the margins, and orientation on the patient. The tissue was processed  using horizontal sectioning of the base and continuous peripheral margins.  The histopathologic sections were reviewed in conjunction with the reference map.    Total blocks: 1    Total slides:  3    There were no cancer cells visualized on examination, therefore Mohs surgery was complete.    Reconstruction: Intermediate Linear Closure      The patient was taken to the operative suite and placed supine on the operating room table.  The defect was identified.  Appropriate markings were made with a marking pen to plan the repair.  The area was infiltrated with Lidocaine 1% with epi 1:100,000 and prepped with Hibiclens and draped with sterile towels.     The wound was debeveled and undermined widely.  Cones were excised within relaxed skin tension lines on both sides of the defect.  Hemostasis was obtained using monopolar electrodessication.  Subcutaneous tissues were then approximated 3-0 Vicryl and 4-0 Monocryl buried vertical mattress sutures.  The wound edges were then approximated additional  buried sutures were placed in a similar fashion where needed.  Simple running subcuticular 4-0 Monocryl sutures were carefully placed for maximum eversion and meticulous approximation.    Repair Size: 7.0 cm    The wound was cleansed with saline and ointment was applied along the wound surface.     A sterile pressure dressing was applied.  Wound care instructions were given verbally and in writing.  The patient left the operating suite in stable condition.      The attending surgeon was present for key procedure and always immediately available.    Dr. Heather Bradshaw (Mohs micrographic surgery fellow) performed the Mohs micrographic surgery and reconstruction under the direct supervision of Paul Gilmore MD, who was present for the entire micrographic surgery and key portions of the reconstruction, and always immediately available.    Suture removal: N/A (all dissolving sutures used)    Follow-up with dermatologic surgery: 2  week wound check per patient preference     Heather Bradshaw MD  Micrographic Surgery and Dermatologic Oncology (MSDO) Fellow, PGY-5      Staff Involved:   Scribe/ Fellow/ Staff    Scribe Disclosure:   I, VANESSA MARSHALL, am serving as a scribe; to document services personally performed by Paul Gilmore MD -based on data collection and the provider's statements to me.       Attending attestation:  I was present for key elements of the procedure and immediately available for all other portions of the procedure.  I have reviewed the note and edited it as necessary.    Paul Gilmore M.D.  Professor  Director of Dermatologic Surgery  Department of Dermatology  [facility]    Dermatology Surgery Clinic  [facility] Surgery [place] [id number]

## 2024-01-08 NOTE — LETTER
1/8/2024       RE: Raul Oates  [address]  [facility] [address]     Dear Colleague,    Thank you for referring your patient, Raul Oates, to the [facility] [place] [facility]. Please see a copy of my visit note below.    [facility] [place] Procedure Note      Date of Service:  Jan 8, 2024  Surgery: Mohs micrographic surgery    Case 1  Repair Type: Intermediate  Repair Size: 7.0 cm  Suture Material: 3-0 Vicryl / 4-0 Monocryl   Tumor Type: Basal Cell Carcinoma   Location: L Anterior Shoulder  Derm-Path Accession #: [id number]   PreOp Size: 2.5 x 1.9 cm  PostOp Size: 3.0 x 2.5 cm  Mohs Accession #: [id number]  Level of Defect: Fat      Procedure:  We discussed the principles of treatment and most likely complications including scarring, bleeding, infection, swelling, pain, crusting, nerve damage, large wound,  incomplete excision, wound dehiscence,  nerve damage, recurrence, and a second procedure may be recommended to obtain the best cosmetic or functional result.    Informed consent was obtained and the patient underwent the procedure as follows:  The patient was placed supine on the operating table.  The cancer was identified, outlined with a marker, and verified by the patient.  The entire surgical field was prepped with Hibiclens.  The surgical site was anesthetized using Lidocaine 1% with epi 1 : 100,000.      The area of clinically apparent tumor was not debulked. The layer of tissue was then surgically excised using a #15 blade and was then transferred onto a specimen sheet maintaining the orientation of the specimen. Hemostasis was obtained using monopolar electrodessication. The wound site was then covered with a dressing while the tissue samples were processed for examination.    The excised tissue was transported to the Mohs histology laboratory maintaining the tissue  orientation.  The tissue specimen was relaxed so that the entire surgical margin was in a a single horizontal plane for sectioning and inked for precise mapping.  A precise reference map was drawn to reflect the sectioning of the specimen, colored inking of the margins, and orientation on the patient. The tissue was processed using horizontal sectioning of the base and continuous peripheral margins.  The histopathologic sections were reviewed in conjunction with the reference map.    Total blocks: 1    Total slides:  3    There were no cancer cells visualized on examination, therefore Mohs surgery was complete.    Reconstruction: Intermediate Linear Closure      The patient was taken to the operative suite and placed supine on the operating room table.  The defect was identified.  Appropriate markings were made with a marking pen to plan the repair.  The area was infiltrated with Lidocaine 1% with epi 1:100,000 and prepped with Hibiclens and draped with sterile towels.     The wound was debeveled and undermined widely.  Cones were excised within relaxed skin tension lines on both sides of the defect.  Hemostasis was obtained using monopolar electrodessication.  Subcutaneous tissues were then approximated 3-0 Vicryl and 4-0 Monocryl buried vertical mattress sutures.  The wound edges were then approximated additional  buried sutures were placed in a similar fashion where needed.  Simple running subcuticular 4-0 Monocryl sutures were carefully placed for maximum eversion and meticulous approximation.    Repair Size: 7.0 cm    The wound was cleansed with saline and ointment was applied along the wound surface.     A sterile pressure dressing was applied.  Wound care instructions were given verbally and in writing.  The patient left the operating suite in stable condition.      The attending surgeon was present for key procedure and always immediately available.    Dr. Heather Bradshaw (Mohs micrographic surgery fellow)  performed the Mohs micrographic surgery and reconstruction under the direct supervision of Paul Gilmore MD, who was present for the entire micrographic surgery and key portions of the reconstruction, and always immediately available.    Suture removal: N/A (all dissolving sutures used)    Follow-up with dermatologic surgery: 2 week wound check per patient preference     Heather Bradshaw MD  Micrographic Surgery and Dermatologic Oncology (MSDO) Fellow, PGY-5      Staff Involved:   Scribe/ Fellow/ Staff    Scribe Disclosure:   I, VANESSA MARSHALL, am serving as a scribe; to document services personally performed by Paul Gilmore MD -based on data collection and the provider's statements to me.       Attending attestation:  I was present for key elements of the procedure and immediately available for all other portions of the procedure.  I have reviewed the note and edited it as necessary.    Paul Gilmore M.D.  Professor  Director of Dermatologic Surgery  Department of Dermatology  [facility]    Dermatology Surgery Clinic  [facility] Surgery [address] [address]

## 2024-01-08 NOTE — PATIENT INSTRUCTIONS
Wound care    I will experience scar, bleeding, swelling, pain, crusting and redness. I may experience incomplete removal or recurrence. Risks are bleeding, bruising, swelling, infection, nerve damage, & a large wound. A second procedure may be recommended to obtain the best cosmetic or functional result.       A three month office visit with your Surgeon is recommended for scar evaluation. Please reach out sooner if you have concerns about you surgical site/wound.    Caring for your skin after surgery    After your surgery, a pressure bandage will be placed over the area that has stitches. This is important to prevent bleeding. Please follow these instructions over the next 1 to 2 weeks. Following this regimen will help to prevent complications as your wound heals.     For the first 48 hours after your surgery:    Leave the pressure dressing on and keep it dry. If it should come loose, you may re-tape it, but do not take it off.  Relax and take it easy. Do not do any vigorous exercise or heavy lifting. This could cause the wound to bleed.  Post-Operative pain is usually mild. If you are able to take tylenol, You may take plain or extra-strength Tylenol (acetaminophen) As directed on the bottle (do not take more than 4,000mg in one day). If you are able to take ibuprofen, you can alternate the tylenol and ibuprofen.   Avoid alcohol as this may increase your tendency to bleed.   You may put an ice pack around the bandaged area for 20 minutes at a time as needed. This may help reduce swelling, bruising, and pain. Make sure the ice pack is waterproof so that the pressure bandage doesn t get wet.  If the wound is on the face try to sleep with your head elevated. Either in a recliner or propped up in bed, this will decrease swelling around the eyes.   You may see a small amount of drainage or blood on your pressure bandage. This is normal. However:  If drainage or bleeding continues or saturates the bandage, you will  "need to apply firm pressure over the bandage with a piece of gauze for 15 minutes.  If bleeding continues after applying pressure for 15 minutes, apply an ice pack to the bandaged area for 15 minutes.  If bleeding still continues, call our office or go to the nearest emergency room.    Remove pressure dressing 48 hours after surgery:    Carefully remove the pressure bandage. If it seems sticky or too difficult to get off, you may need to soak it off in the shower.  After the pressure dressing is removed, you may shower and get the wound wet. However, Do Not let the forceful stream of the shower hit the wound directly.  Follow these wound care and dressing change instructions:   In the shower wash the surgical site last with its own separate wash cloth.  You may allow water to run over the site. Take a clean wash cloth wet with soapy warm water and gently pat the suture site to help remove any crust or drainage.   Do Not rub or scrub the site    After site is clean pat dry and apply a thin layer of Vaseline ointment  over the suture site with a cotton swab or clean finger.   Cover the suture site with Telfa (non-stick) dressing. You may tape a piece of gauze over the Telfa for extra protection if you wish.  Continue wound care at least once a day, twice if you are active or around a contaminated environment.  Continue daily wound care until your surgical site is completely healed.   Dissolving stitches, if you have been told your stitches are dissolving they should dissolve in one to one and a half week.      If you are able to take acetaminophen (\"Tylenol,\" etc.) and ibuprofen (\"Advil\" or \"Motrin,\" etc.), then you may STAGGER these medications by taking 400 mg of ibuprofen (usually two tabs) every 8 hours and 1,000 mg of acetaminophen (e.g., two tabs of extra-strength Tylenol) every 8 hours.    This means, for example, that you could take the followin,000 mg of Tylenol, followed 4 hours later by 400 mg " Ibuprofen, followed 4 hours later with 1,000 mg of Tylenol, followed 4 hours later by 400 mg Ibuprofen, followed 4 hours later with 1,000 mg of Tylenol, and so forth.     Essentially, you can take either 1,000 mg of Tylenol or 400 mg of ibuprofen in alternating fashion EVERY FOUR HOURS.    Do NOT exceed more than 4,000 mg of Tylenol or 3,200 mg of ibuprofen per 24 hours. If you are not able to take Tylenol or ibuprofen as above due to other health issues (or a physician has told you directly that you are not allowed to take one of them, say due to pre-existing severe liver or kidney issues), then disregard the above directions.    Scientific evidence supports that this combination/schedule of pain medications is just as effective, if not more effective, than taking a narcotic pain medicine.       Follow up will be a 3 month scar evaluation either in person or via a telephone visit with you sending in a photo via [employer] Unless you have been told to follow up sooner or if you have concerns and would like to be see sooner. Please call or send us in a [employer] message if possible and attach a photo.        What to expect:    The first couple of days your wound may be tender and may bleed slightly when doing wound care.  There may be swelling and bruising around the wound, especially if it is near the eyes. For your comfort, you may apply ice or cold compresses to the bruises after your have removed the pressure bandage.  The area around your wound may be numb for several weeks or even months.  You may experience periodic sharp pain or mild itching around the wound as it heals.   The suture line will look dark for a while but will lighten over time.       When to call us:    You have bleeding that will not stop after applying pressure and ice.  You have pain that is not controlled with Tylenol (acetaminophen.)  You have signs or symptoms of an infection such as:  Fever over 100 degrees Fahrenheit  Redness, warmth or  foul-smelling drainage from the wound  If you have any questions, or are not sure how to take care of the wound.    Phone numbers:    During business hours (M-F 8:00-4:30 p.m.)  [facility]-  [phone] Option 1 appt. desk  [phone]  Option 3 nurse triage line  ---------------------------------------------------------  Evenings/Weekends/Holidays  Hospital - [phone]   TTY for hearing myqjjfcu-[phone]  *Ask  to page dermatologist on-call  Emergency Vzjz-[phone]  TTY for hearing impaired- [phone]

## 2024-01-08 NOTE — NURSING NOTE
Chief Complaint   Patient presents with    Skin Cancer     Left anterior shoulder BCC     Seda SEVERINO, RN-BSN  Dermatology Surgery  [phone]

## 2024-01-09 ENCOUNTER — TELEPHONE (OUTPATIENT)
Dept: DERMATOLOGY | Facility: CLINIC | Age: 54
End: 2024-01-09
Payer: COMMERCIAL

## 2024-01-09 NOTE — TELEPHONE ENCOUNTER
Follow up call completed following Mohs procedure with Dr. Gilmore.     The following questions were asked:    What are you doing to manage your pain? Tylenol  Is it helping? Yes  Are you applying ice? No  Have you had any noticeable bleeding through the bandage? Yes  Do you have any concerns? No        Please call [phone] option 3 if you have any additional questions.    Becca ANGEL RN

## 2024-01-10 ENCOUNTER — PRE VISIT (OUTPATIENT)
Dept: UROLOGY | Facility: CLINIC | Age: 54
End: 2024-01-10
Payer: COMMERCIAL

## 2024-01-10 NOTE — CONFIDENTIAL NOTE
Reason for visit: consult     Relevant information: kidney stone    Records/imaging/labs/orders: in epic    At Rooming: loreto Santacruz  1/10/2024  10:14 AM

## 2024-01-19 ENCOUNTER — VIRTUAL VISIT (OUTPATIENT)
Dept: UROLOGY | Facility: CLINIC | Age: 54
End: 2024-01-19
Payer: COMMERCIAL

## 2024-01-19 ENCOUNTER — PREP FOR PROCEDURE (OUTPATIENT)
Dept: UROLOGY | Facility: CLINIC | Age: 54
End: 2024-01-19

## 2024-01-19 ENCOUNTER — PRE VISIT (OUTPATIENT)
Dept: UROLOGY | Facility: CLINIC | Age: 54
End: 2024-01-19

## 2024-01-19 VITALS — BODY MASS INDEX: 40.24 KG/M2 | WEIGHT: 238 LBS

## 2024-01-19 DIAGNOSIS — N20.0 KIDNEY STONE: ICD-10-CM

## 2024-01-19 DIAGNOSIS — N20.0 NEPHROLITHIASIS: Primary | ICD-10-CM

## 2024-01-19 PROCEDURE — 99204 OFFICE O/P NEW MOD 45 MIN: CPT | Mod: 95 | Performed by: UROLOGY

## 2024-01-19 RX ORDER — CEFAZOLIN SODIUM 2 G/50ML
2 SOLUTION INTRAVENOUS
OUTPATIENT
Start: 2024-01-19

## 2024-01-19 RX ORDER — ACETAMINOPHEN 325 MG/1
975 TABLET ORAL ONCE
OUTPATIENT
Start: 2024-01-19 | End: 2024-01-19

## 2024-01-19 RX ORDER — CEFAZOLIN SODIUM 2 G/50ML
2 SOLUTION INTRAVENOUS SEE ADMIN INSTRUCTIONS
OUTPATIENT
Start: 2024-01-19

## 2024-01-19 ASSESSMENT — PAIN SCALES - GENERAL: PAINLEVEL: NO PAIN (0)

## 2024-01-19 NOTE — NURSING NOTE
Is the patient currently in the state of [place]? YES    Visit mode:VIDEO    If the visit is dropped, the patient can be reconnected by: VIDEO VISIT: Text to cell phone:   Telephone Information:   Mobile [phone]       Will anyone else be joining the visit? NO  (If patient encounters technical issues they should call [phone])    How would you like to obtain your AVS? MyChart    Are changes needed to the allergy or medication list? No    Reason for visit: RECHECK    Tanvi GUZMÁN

## 2024-01-19 NOTE — PROGRESS NOTES
Urology Clinic  MAREN Torres MD  Jan 19, 2024  53 year old male    ASSESSMENT and PLAN     Kidney stones  12/18/23 CT left uvj 8mm stone.  12mm left lower pole calculus. 950HU      Gross hematuria  11/2020 Evaluation by Dr Meyer, but no cystoscopy was done.    The following are complicating factors.  These increase the risk of perioperative complications and make treatment more complicated.  Hypertension which is now well controlled.        Counseling during this visit:  We covered the natural history of kidney stones, the risk of progression to symptomatic pain/infection, and the possibility of renal failure/kidney damage.    We covered treatment options including observation, ureteroscopy, extracorporeal shock wave lithotripsy (ESWL), and percutaneous nephrolithotomy (PCNL).    We covered surgical risks which include but are not limited to heart attack, stroke, blood clot in the legs or lungs, death, injury to surrounding organs (intestine, liver, spleen, pancreas, lung, muscles, nerves), loss of sensation around incisions, decreased renal function, infection, injury to ureter, injury to bladder, and urethral strictures.  Additional procedures may be necessary in the perioperative period.    Overall for this is a kidney stone.  Today's nephrolithotomy is the best option for lower pole stone.  Plan to address his ureterovesical junction stone at the same time as well.      Plan  Left percutaneous nephrolithotomy.    ____________________________________________________________________    HPI  He is here for evaluation of his left-sided kidney stones.  He had a CT done recently at the recommendation of his primary care provider which identified the stones.  He may have had a stone in 2020 but otherwise this is his first kidney stone.  Denies any fevers chills or other infection symptoms.    I reviewed the radiologic images and reports from the radiologic exam (12/18/23 CT) listed above.  My independent  interpretation is listed there as well.      I reviewed the following laboratory data and went over findings with patient:  Recent Labs   Lab Test 10/27/23  1431 01/11/23  1640 06/02/21  1333 03/02/20  1627   WBC 8.8 8.5 7.9 8.0   HGB 15.2 15.2 15.6 15.7    245 243 240     Recent Labs   Lab Test 12/21/23  1155 10/27/23  1431 01/11/23  1640 10/12/20  1405 03/02/20  1627   CR 1.18* 0.95 0.80 0.78 0.60*   GFRESTIMATED 74 >90 >90 >90 >90   GFRESTBLACK  --   --   --  >90 >90   * 151* 105* 118* 103*     Video-Visit Details  Video Start Time: 200  Video End Time: 206  Time spent on pre-visit work, post visit work, and documentation on day of visit outside of time during video visit: 5 min  Total time on the day of the visit: 11 min  Originating Location (pt. Location): Home.    Distant Location (provider location):  [facility].  Platform used for Video Visit: [employer] Water[employer]  Patient Care Team:  Jonathon Alas APRN CNP as PCP - General  Jewison, Ceasar Wagoner MD as MD (Family Medicine - Sports Medicine)  Ciaran Meyer MD as MD (Urology)  Xochitl Nolan NP as Assigned PCP  Dylan Marquez MD as MD (Dermatology)  Ivelisse Bedolla MD as MD (Urology)  Paul Gilmore MD as Assigned Surgical Provider  Zara Briscoe RN as Specialty Care Coordinator (Cardiology)  IVELISSE BEDOLLA    Copy to patient  MARGE LORENZANA  [address] [facility] [facility] [place] [address]

## 2024-01-19 NOTE — PROGRESS NOTES
"Virtual Visit Details    Type of service:  Video Visit     Originating Location (pt. Location): {video visit patient location:351332::\"Home\"}  {PROVIDER LOCATION On-site should be selected for visits conducted from your clinic location or adjoining [facility] hospital, academic office, or other nearby [facility] building. Off-site should be selected for all other provider locations, including home:983813}  Distant Location (provider location):  {virtual location provider:437631}  Platform used for Video Visit: {Virtual Visit Platforms:882734::\"[employer]\"}    "

## 2024-01-19 NOTE — LETTER
1/19/2024       RE: Raul Oates  [address]  [facility] [address]     Dear Colleague,    Thank you for referring your patient, Raul Oates, to the [facility] [place] at [facility]. Please see a copy of my visit note below.      Urology Clinic  MAREN Torres MD  Jan 19, 2024  53 year old male    ASSESSMENT and PLAN     Kidney stones  12/18/23 CT left uvj 8mm stone.  12mm left lower pole calculus. 950HU      Gross hematuria  11/2020 Evaluation by Dr Meyer, but no cystoscopy was done.    The following are complicating factors.  These increase the risk of perioperative complications and make treatment more complicated.  Hypertension which is now well controlled.        Counseling during this visit:  We covered the natural history of kidney stones, the risk of progression to symptomatic pain/infection, and the possibility of renal failure/kidney damage.    We covered treatment options including observation, ureteroscopy, extracorporeal shock wave lithotripsy (ESWL), and percutaneous nephrolithotomy (PCNL).    We covered surgical risks which include but are not limited to heart attack, stroke, blood clot in the legs or lungs, death, injury to surrounding organs (intestine, liver, spleen, pancreas, lung, muscles, nerves), loss of sensation around incisions, decreased renal function, infection, injury to ureter, injury to bladder, and urethral strictures.  Additional procedures may be necessary in the perioperative period.    Overall for this is a kidney stone.  Today's nephrolithotomy is the best option for lower pole stone.  Plan to address his ureterovesical junction stone at the same time as well.      Plan  Left percutaneous nephrolithotomy.    ____________________________________________________________________    HPI  He is here for evaluation of his left-sided kidney stones.  He had a CT done recently at the  recommendation of his primary care provider which identified the stones.  He may have had a stone in 2020 but otherwise this is his first kidney stone.  Denies any fevers chills or other infection symptoms.    I reviewed the radiologic images and reports from the radiologic exam (12/18/23 CT) listed above.  My independent interpretation is listed there as well.      I reviewed the following laboratory data and went over findings with patient:  Recent Labs   Lab Test 10/27/23  1431 01/11/23  1640 06/02/21  1333 03/02/20  1627   WBC 8.8 8.5 7.9 8.0   HGB 15.2 15.2 15.6 15.7    245 243 240     Recent Labs   Lab Test 12/21/23  1155 10/27/23  1431 01/11/23  1640 10/12/20  1405 03/02/20  1627   CR 1.18* 0.95 0.80 0.78 0.60*   GFRESTIMATED 74 >90 >90 >90 >90   GFRESTBLACK  --   --   --  >90 >90   * 151* 105* 118* 103*     Video-Visit Details  Video Start Time: 200  Video End Time: 206  Time spent on pre-visit work, post visit work, and documentation on day of visit outside of time during video visit: 5 min  Total time on the day of the visit: 11 min  Originating Location (pt. Location): Home.    Distant Location (provider location):  [facility].  Platform used for Video Visit: Minitrade        Patient Care Team:  Jonathon Alas APRN CNP as PCP -   [facility] as [facility] (Family Medicine - Sports Medicine)  Ciaran Meyer MD as MD (Urology)  Xochitl Nolan NP as Assigned PCP  Dylan Marquez MD as MD (Dermatology)  Ivelisse Bedolla MD as MD (Urology)  Paul Gilmore MD as Assigned Surgical Provider  Zara Briscoe, RN as Specialty Care Coordinator (Cardiology)  IVELISSE BEDOLLA    Copy to patient  MARGE LORENZANA  [address] [facility] [address]      Again, thank you for allowing me to participate in the care of your patient.      Sincerely,    Ivelisse Bedolla MD

## 2024-01-22 ENCOUNTER — VIRTUAL VISIT (OUTPATIENT)
Dept: DERMATOLOGY | Facility: CLINIC | Age: 54
End: 2024-01-22
Payer: COMMERCIAL

## 2024-01-22 DIAGNOSIS — C44.619 BASAL CELL CARCINOMA (BCC) OF LEFT SHOULDER: Primary | ICD-10-CM

## 2024-01-22 PROCEDURE — 99024 POSTOP FOLLOW-UP VISIT: CPT | Mod: GC | Performed by: DERMATOLOGY

## 2024-01-22 ASSESSMENT — PAIN SCALES - GENERAL: PAINLEVEL: NO PAIN (0)

## 2024-01-22 NOTE — PROGRESS NOTES
[facility] Dermatology Note  Encounter Date: Jan 22, 2024  Store-and-Forward and Telephone. Location of teledermatologist: [facility] [place].  Start time: 3:35. End time: 3:40.    Dermatology Problem List:  Hx of NMSC:   - BCC, L Anterior Shoulder, s/p shave bx 10/31/23, s/p MMS 01/08/24  2. Hx of Benign bx:   - Verruca Vulgaris, L Vertex Scalp, s/p shave bx 10/31/23  3. Multiple Cherry Angioma  4. Cyst    CC: Derm Problem (Patient is here today for a 2 week wound check.)      Subjective: Raul Oates is a 53 year old male who presents today for wound check s/p MMS of BCC on L anterior shoulder.   - Reports site is healing well. No tenderness or drainage.   - no other concerns today    Objective:   Focused examination of the L shoulder via teledermatology photograph(s) from 1/15/24 shows a well healed surgical excision; site is clean, dry, and intact. There is no surrounding erythema or purulence. No clinical evidence of infection noted today.    Assessment and Plan:     1. BCC, L anterior shoulder  - s/p shave bx 10/31/23, s/p MMS on 01/08/24   - The patient's surgery site(s) is/are healing very well. No evidence of infection on examination today.  - The patient was told to continue with wound cares until the area(s) is/are no longer crusted.   - The patient should follow up with dermatologic surgery PRN, as well as continue with regular skin exams in general dermatology clinic.    Patient was discussed with and evaluated by attending physician Dr. Gilmore.    Staff and Resident:    Reilly SPRAGUE MD, discussed and evaluated the patient with Dr. Gilmore.

## 2024-01-22 NOTE — NURSING NOTE
Chief Complaint   Patient presents with    Derm Problem     Patient is here today for a 2 week wound check.     Becca ANGEL RN

## 2024-01-22 NOTE — LETTER
1/22/2024       RE: Raul Oates  [address]  [facility] [address]     Dear Colleague,    Thank you for referring your patient, Raul Oates, to the [facility] [place] at [facility]. Please see a copy of my visit note below.    [facility] Dermatology Note  Encounter Date: Jan 22, 2024  Store-and-Forward and Telephone. Location of teledermatologist: [facility] [place].  Start time: 3:35. End time: 3:40.    Dermatology Problem List:  Hx of NMSC:   - BCC, L Anterior Shoulder, s/p shave bx 10/31/23, s/p MMS 01/08/24  2. Hx of Benign bx:   - Verruca Vulgaris, L Vertex Scalp, s/p shave bx 10/31/23  3. Multiple Cherry Angioma  4. Cyst    CC: Derm Problem (Patient is here today for a 2 week wound check.)      Subjective: Raul Oates is a 53 year old male who presents today for wound check s/p MMS of BCC on L anterior shoulder.   - Reports site is healing well. No tenderness or drainage.   - no other concerns today    Objective:   Focused examination of the L shoulder via teledermatology photograph(s) from 1/15/24 shows a well healed surgical excision; site is clean, dry, and intact. There is no surrounding erythema or purulence. No clinical evidence of infection noted today.    Assessment and Plan:     1. BCC, L anterior shoulder  - s/p shave bx 10/31/23, s/p MMS on 01/08/24   - The patient's surgery site(s) is/are healing very well. No evidence of infection on examination today.  - The patient was told to continue with wound cares until the area(s) is/are no longer crusted.   - The patient should follow up with dermatologic surgery PRN, as well as continue with regular skin exams in general dermatology clinic.    Patient was discussed with and evaluated by attending physician Dr. Gilmore.    Staff and Resident:    Reilly SPRAGUE MD, discussed and evaluated  the patient with Dr. Gilmore.        Attestation signed by Paul Gilmore MD at 2/6/2024  7:38 PM:  Attending Attestation  I attest that the Scribe recorded the interview and exam that I personally performed.  I have reviewed the note and edited it as necessary.    Paul Gilmore M.D.  Professor  Director of Dermatologic Surgery  Department of Dermatology  [facility]

## 2024-01-25 ENCOUNTER — TELEPHONE (OUTPATIENT)
Dept: DERMATOLOGY | Facility: CLINIC | Age: 54
End: 2024-01-25
Payer: COMMERCIAL

## 2024-01-25 NOTE — TELEPHONE ENCOUNTER
Lvm for pt to call back to schedule the following:    Appointment type: Return  Provider: any provider  Return date: July 2024  Specialty phone number: [phone]

## 2024-01-26 ENCOUNTER — TELEPHONE (OUTPATIENT)
Dept: UROLOGY | Facility: CLINIC | Age: 54
End: 2024-01-26
Payer: COMMERCIAL

## 2024-01-26 NOTE — TELEPHONE ENCOUNTER
Called patient to schedule surgery/procedure with Dr. Torres there was no answer, left voice message for patient to callback direct line to schedule.     Gilson Caballero on 1/26/2024 at 8:30 AM

## 2024-03-21 ENCOUNTER — MYC REFILL (OUTPATIENT)
Dept: FAMILY MEDICINE | Facility: CLINIC | Age: 54
End: 2024-03-21

## 2024-03-21 DIAGNOSIS — I10 HYPERTENSION, UNSPECIFIED TYPE: ICD-10-CM

## 2024-03-21 RX ORDER — AMLODIPINE AND VALSARTAN 5; 320 MG/1; MG/1
1 TABLET ORAL DAILY
Qty: 30 TABLET | Refills: 1 | Status: SHIPPED | OUTPATIENT
Start: 2024-03-21 | End: 2024-03-29

## 2024-03-29 ENCOUNTER — OFFICE VISIT (OUTPATIENT)
Dept: FAMILY MEDICINE | Facility: CLINIC | Age: 54
End: 2024-03-29
Payer: COMMERCIAL

## 2024-03-29 VITALS
HEART RATE: 98 BPM | RESPIRATION RATE: 18 BRPM | WEIGHT: 259.8 LBS | OXYGEN SATURATION: 94 % | TEMPERATURE: 98.2 F | BODY MASS INDEX: 44.35 KG/M2 | SYSTOLIC BLOOD PRESSURE: 124 MMHG | DIASTOLIC BLOOD PRESSURE: 87 MMHG | HEIGHT: 64 IN

## 2024-03-29 DIAGNOSIS — I10 HYPERTENSION, UNSPECIFIED TYPE: Primary | ICD-10-CM

## 2024-03-29 DIAGNOSIS — N20.0 KIDNEY STONE: ICD-10-CM

## 2024-03-29 LAB
ALBUMIN UR-MCNC: 20 MG/DL
APPEARANCE UR: ABNORMAL
BACTERIA #/AREA URNS HPF: ABNORMAL /HPF
BILIRUB UR QL STRIP: NEGATIVE
COLOR UR AUTO: YELLOW
CREAT UR-MCNC: 162 MG/DL
GLUCOSE UR STRIP-MCNC: NEGATIVE MG/DL
HBA1C MFR BLD: 6.4 %
HGB UR QL STRIP: ABNORMAL
KETONES UR STRIP-MCNC: NEGATIVE MG/DL
LEUKOCYTE ESTERASE UR QL STRIP: ABNORMAL
MICROALBUMIN UR-MCNC: 33.5 MG/L
MICROALBUMIN/CREAT UR: 20.68 MG/G CR (ref 0–17)
MUCOUS THREADS #/AREA URNS LPF: PRESENT /LPF
NITRATE UR QL: POSITIVE
PH UR STRIP: 6 [PH] (ref 5–7)
RBC URINE: 10 /HPF
SP GR UR STRIP: 1.02 (ref 1–1.03)
SQUAMOUS EPITHELIAL: 6 /HPF
UROBILINOGEN UR STRIP-MCNC: NORMAL MG/DL
WBC CLUMPS #/AREA URNS HPF: PRESENT /HPF
WBC URINE: 137 /HPF

## 2024-03-29 PROCEDURE — 83695 ASSAY OF LIPOPROTEIN(A): CPT | Mod: ORL | Performed by: INTERNAL MEDICINE

## 2024-03-29 PROCEDURE — 81001 URINALYSIS AUTO W/SCOPE: CPT | Mod: ORL | Performed by: UROLOGY

## 2024-03-29 PROCEDURE — 83036 HEMOGLOBIN GLYCOSYLATED A1C: CPT | Mod: ORL | Performed by: INTERNAL MEDICINE

## 2024-03-29 PROCEDURE — 80061 LIPID PANEL: CPT | Mod: ORL | Performed by: INTERNAL MEDICINE

## 2024-03-29 PROCEDURE — 87086 URINE CULTURE/COLONY COUNT: CPT | Mod: ORL | Performed by: UROLOGY

## 2024-03-29 PROCEDURE — 80053 COMPREHEN METABOLIC PANEL: CPT | Mod: ORL | Performed by: INTERNAL MEDICINE

## 2024-03-29 PROCEDURE — 82570 ASSAY OF URINE CREATININE: CPT | Mod: ORL | Performed by: INTERNAL MEDICINE

## 2024-03-29 RX ORDER — AMLODIPINE AND VALSARTAN 5; 320 MG/1; MG/1
1 TABLET ORAL DAILY
Qty: 90 TABLET | Refills: 3 | Status: SHIPPED | OUTPATIENT
Start: 2024-03-29

## 2024-03-29 ASSESSMENT — ENCOUNTER SYMPTOMS
FATIGUE: 0
SHORTNESS OF BREATH: 0

## 2024-03-29 ASSESSMENT — PAIN SCALES - GENERAL: PAINLEVEL: SEVERE PAIN (7)

## 2024-03-29 NOTE — NURSING NOTE
"ROOM:1  NATALI BARBOSA    Preferred Name: Raul     How did you hear about us?  Current Patient     Services Provided? No    53 year old  Chief Complaint   Patient presents with    Recheck Medication     Follow Up to go over medication (Amlodipine)        Blood pressure 124/87, pulse 103, temperature 98.2  F (36.8  C), temperature source Oral, resp. rate 18, height 1.636 m (5' 4.4\"), weight 117.8 kg (259 lb 12.8 oz), SpO2 94%. Body mass index is 44.04 kg/m .  BP completed using cuff size:        Patient Active Problem List   Diagnosis    Hyperlipidemia LDL goal <160    History of total hip arthroplasty, left    Essential hypertension, benign    Hypertension    Morbid obesity (H)    Acute pain of right knee       Wt Readings from Last 2 Encounters:   03/29/24 117.8 kg (259 lb 12.8 oz)   01/19/24 108 kg (238 lb)     BP Readings from Last 3 Encounters:   03/29/24 124/87   01/08/24 (!) 182/77   12/21/23 123/72       Allergies   Allergen Reactions    No Known Allergies        Current Outpatient Medications   Medication    amLODIPine-valsartan (EXFORGE) 5-320 MG tablet    atorvastatin (LIPITOR) 20 MG tablet    tamsulosin (FLOMAX) 0.4 MG capsule     Current Facility-Administered Medications   Medication    lidocaine (PF) (XYLOCAINE) 1 % injection 4 mL    triamcinolone (KENALOG-40) injection 40 mg       Social History     Tobacco Use    Smoking status: Never    Smokeless tobacco: Never   Substance Use Topics    Alcohol use: No     Alcohol/week: 0.0 standard drinks of alcohol    Drug use: No       Honoring Choices - Health Care Directive Guide offered to patient at time of visit.    Health Maintenance Due   Topic Date Due    YEARLY PREVENTIVE VISIT  Never done    ADVANCE CARE PLANNING  Never done    Pneumococcal Vaccine: Pediatrics (0 to 5 Years) and At-Risk Patients (6 to 64 Years) (1 of 2 - PCV) Never done    COLORECTAL CANCER SCREENING  Never done    HIV SCREENING  Never done    HEPATITIS C SCREENING  Never " "done    HEPATITIS B IMMUNIZATION (1 of 3 - 19+ 3-dose series) Never done    DTAP/TDAP/TD IMMUNIZATION (1 - Tdap) Never done    ZOSTER IMMUNIZATION (1 of 2) Never done    INFLUENZA VACCINE (1) 09/01/2023    COVID-19 Vaccine (6 - 2023-24 season) 09/01/2023       Immunization History   Administered Date(s) Administered    COVID-19 Bivalent 18+ (Moderna) 09/26/2022    COVID-19 MONOVALENT 12+ (Pfizer) 04/30/2021, 05/18/2021    COVID-19 Monovalent 18+ (Moderna) 12/21/2021    Influenza Vaccine >6 months,quad, PF 10/14/2020       No results found for: \"PAP\"    Recent Labs   Lab Test 12/21/23  1155 10/27/23  1431 01/11/23  1640 01/11/23  1640 06/02/21  1333 10/12/20  1405 03/02/20  1627 03/02/20  1627   A1C  --  6.3*  --  6.4* 5.8* 5.7*   < >  --    *  140*  --   --   --   --  124*  --   --    HDL 54  --   --   --   --  71  --   --    TRIG 93  --   --   --   --  62  --   --    ALT 40 25  --  15  --  48  --  26   CR 1.18* 0.95   < > 0.80  --  0.78  --  0.60*   GFRESTIMATED 74 >90   < > >90  --  >90  --  >90   GFRESTBLACK  --   --   --   --   --  >90  --  >90   ALBUMIN 4.2 4.4   < > 4.5  --  3.7  --  4.1   POTASSIUM 5.4* 4.8   < > 5.1  --  4.6  --  4.3    < > = values in this interval not displayed.           3/29/2024    12:27 PM 1/19/2024     1:40 PM   PHQ-2 ( 1999 Pfizer)   Q1: Little interest or pleasure in doing things 0 0   Q2: Feeling down, depressed or hopeless 0 0   PHQ-2 Score 0 0   Q1: Little interest or pleasure in doing things Not at all    Q2: Feeling down, depressed or hopeless Not at all    PHQ-2 Score 0            10/14/2020     2:25 PM 1/11/2023     3:22 PM   PHQ-9 SCORE   PHQ-9 Total Score 9 6           10/14/2020     2:25 PM 1/11/2023     3:22 PM   ELBERT-7 SCORE   Total Score 10 9            No data to display                Noah Gamble    March 29, 2024 12:35 PM    "

## 2024-03-29 NOTE — PROGRESS NOTES
Today's Date: Mar 29, 2024     Patient Raul Oatse 1970 presents to the clinic today to address   Chief Complaint   Patient presents with    Recheck Medication     Follow Up to go over medication (Amlodipine)              SUBJECTIVE     History of Present Illness:      53-year-old male with past medical history of HTN, HLD, BCC left anterior shoulder status post surgical excision presents to clinic for blood pressure evaluation and refill.  Patient currently on amlodipine-valsartan 5-320 mg daily.  He denies side effects with this medication.  He was started on atorvastatin 20 mg daily by cardiology and denies concerns/side effects with this medication.  Otherwise, patient feels at baseline and denies acute concerns or symptoms on exam.    Review of Systems   Constitutional:  Negative for fatigue.   Respiratory:  Negative for shortness of breath.    Cardiovascular:  Negative for chest pain and peripheral edema.         Allergies   Allergen Reactions    No Known Allergies         Current Outpatient Medications   Medication Instructions    amLODIPine-valsartan (EXFORGE) 5-320 MG tablet 1 tablet, Oral, DAILY    atorvastatin (LIPITOR) 20 mg, Oral, DAILY    tamsulosin (FLOMAX) 0.4 mg, Oral, DAILY       Past Medical History:   Diagnosis Date    Epistaxis     Groin mass     Hip pain     Hypertension         Family History   Problem Relation Age of Onset    Unknown/Adopted Mother     Coronary Artery Disease Maternal Grandfather     Unknown/Adopted Father         Social History     Tobacco Use    Smoking status: Never    Smokeless tobacco: Never   Substance Use Topics    Alcohol use: No     Alcohol/week: 0.0 standard drinks of alcohol    Drug use: No        History   Sexual Activity    Sexual activity: Not Currently            10/14/2020     2:25 PM 1/11/2023     3:22 PM   PHQ   PHQ-9 Total Score 9 6   Q9: Thoughts of better off dead/self-harm past 2 weeks Not at all Not at all        Immunization History  "  Administered Date(s) Administered    COVID-19 Bivalent 18+ (Moderna) 09/26/2022    COVID-19 MONOVALENT 12+ (Pfizer) 04/30/2021, 05/18/2021    COVID-19 Monovalent 18+ (Moderna) 12/21/2021    Influenza Vaccine >6 months,quad, PF 10/14/2020                 OBJECTIVE     /87 (BP Location: Left arm, Patient Position: Sitting, Cuff Size: Adult Large)   Pulse 98   Temp 98.2  F (36.8  C) (Oral)   Resp 18   Ht 1.636 m (5' 4.4\")   Wt 117.8 kg (259 lb 12.8 oz)   SpO2 94%   BMI 44.04 kg/m        Labs:  Lab Results   Component Value Date    WBC 8.8 10/27/2023    HGB 15.2 10/27/2023    HCT 45.5 10/27/2023     10/27/2023    CHOL 211 (H) 12/21/2023    TRIG 93 12/21/2023    HDL 54 12/21/2023    ALT 40 12/21/2023    AST 39 12/21/2023     12/21/2023    BUN 22.3 (H) 12/21/2023    CO2 27 12/21/2023    PSA 3.03 01/11/2023    INR 0.99 03/02/2020        Physical Exam  Constitutional:       General: He is not in acute distress.     Appearance: He is not ill-appearing.   Cardiovascular:      Rate and Rhythm: Normal rate and regular rhythm.      Heart sounds: Normal heart sounds. No murmur heard.  Pulmonary:      Effort: Pulmonary effort is normal. No respiratory distress.      Breath sounds: Normal breath sounds. No wheezing or rales.   Musculoskeletal:      Cervical back: Neck supple.      Right lower leg: No edema.      Left lower leg: No edema.   Lymphadenopathy:      Cervical: No cervical adenopathy.   Neurological:      General: No focal deficit present.      Mental Status: He is alert.   Psychiatric:         Thought Content: Thought content normal.         Judgment: Judgment normal.               ASSESSMENT/PLAN     1. Hypertension, unspecified type    This is a very pleasant 53-year-old male with past medical history of HTN, HLD, BCC left anterior shoulder status post surgical excision who presents for blood pressure follow-up.  Blood pressure today 124/87.  Patient denies concerns with " amlodipine-valsartan.  Will continue current regimen.  Will collect labs as noted below which were ordered by cardiology. Patient previously had mild hyperkalemia during last lab.  Considering blood pressure is at goal, will recheck today and encouraged lifestyle modifications including avoiding foods/salt substitutes high in potassium. Encouraged patient to complete CTA coronary.    - amLODIPine-valsartan (EXFORGE) 5-320 MG tablet; Take 1 tablet by mouth daily  Dispense: 90 tablet; Refill: 3  - Comprehensive metabolic panel  - Lipid panel reflex to direct LDL Fasting  - Hemoglobin A1c  - Lipoprotein (a)  - Albumin Random Urine Quantitative with Creat Ratio    2. Kidney stone  Lab per urology.  - UA with Microscopic reflex to Culture          Follow-Up:  - Follow up in 6 month(s) for annual physical or sooner if needed. Disposition pending results.        Options for treatment and follow-up care were reviewed with the patient. Patient engaged in the decision making process and verbalized understanding of the options discussed and agreed with the final plan.  AVS printed and given to patient.    [facility]  [address] [place] [id number]  [phone]        Note: Chart documentation was done in part with Dragon Voice Recognition software.  Although reviewed after completion, some word and grammatical errors may remain. Please contact author for any clarification or concerns.

## 2024-03-29 NOTE — PATIENT INSTRUCTIONS
Be aware of potassium in your diet. Potassium is in many foods, including vegetables, fruits, and milk products.  Foods high in potassium include bananas, cantaloupe, broccoli, milk, potatoes, and tomatoes.  Low-potassium foods include blueberries, raspberries, cucumber, white or brown rice, pasta, and noodles.  Do not use a salt substitute without talking to your doctor first. Most of these are very high in potassium.

## 2024-03-30 LAB
ALBUMIN SERPL BCG-MCNC: 4.3 G/DL (ref 3.5–5.2)
ALP SERPL-CCNC: 145 U/L (ref 40–150)
ALT SERPL W P-5'-P-CCNC: 36 U/L (ref 0–70)
ANION GAP SERPL CALCULATED.3IONS-SCNC: 10 MMOL/L (ref 7–15)
APO A-I SERPL-MCNC: 20 MG/DL
AST SERPL W P-5'-P-CCNC: 30 U/L (ref 0–45)
BILIRUB SERPL-MCNC: 0.5 MG/DL
BUN SERPL-MCNC: 19.5 MG/DL (ref 6–20)
CALCIUM SERPL-MCNC: 9.8 MG/DL (ref 8.6–10)
CHLORIDE SERPL-SCNC: 104 MMOL/L (ref 98–107)
CHOLEST SERPL-MCNC: 180 MG/DL
CREAT SERPL-MCNC: 1.05 MG/DL (ref 0.67–1.17)
DEPRECATED HCO3 PLAS-SCNC: 27 MMOL/L (ref 22–29)
EGFRCR SERPLBLD CKD-EPI 2021: 85 ML/MIN/1.73M2
FASTING STATUS PATIENT QL REPORTED: ABNORMAL
GLUCOSE SERPL-MCNC: 121 MG/DL (ref 70–99)
HDLC SERPL-MCNC: 51 MG/DL
LDLC SERPL CALC-MCNC: 93 MG/DL
NONHDLC SERPL-MCNC: 129 MG/DL
POTASSIUM SERPL-SCNC: 5.1 MMOL/L (ref 3.4–5.3)
PROT SERPL-MCNC: 7.4 G/DL (ref 6.4–8.3)
SODIUM SERPL-SCNC: 141 MMOL/L (ref 135–145)
TRIGL SERPL-MCNC: 179 MG/DL

## 2024-03-31 LAB — BACTERIA UR CULT: NORMAL

## 2024-05-23 ENCOUNTER — PREP FOR PROCEDURE (OUTPATIENT)
Dept: UROLOGY | Facility: CLINIC | Age: 54
End: 2024-05-23
Payer: COMMERCIAL

## 2024-05-23 ENCOUNTER — CARE COORDINATION (OUTPATIENT)
Dept: UROLOGY | Facility: CLINIC | Age: 54
End: 2024-05-23
Payer: COMMERCIAL

## 2024-05-23 NOTE — PROGRESS NOTES
Writer called patient to assess whether or not the patient would like pursue treatment for his kidney stone. Patient states that he is still thinking about it, but if he decides to pursue he would like it done around July. Writer advised that Dr. Torres does book up quickly, and to call as soon as he's decided.    Patient returned verbal understanding and will call when he's made his decision and with further questions/concerns.

## 2024-06-23 ENCOUNTER — HEALTH MAINTENANCE LETTER (OUTPATIENT)
Age: 54
End: 2024-06-23

## 2025-02-20 ENCOUNTER — OFFICE VISIT (OUTPATIENT)
Dept: ORTHOPEDICS | Facility: CLINIC | Age: 55
End: 2025-02-20
Payer: COMMERCIAL

## 2025-02-20 DIAGNOSIS — M16.11 ARTHRITIS OF RIGHT HIP: Primary | ICD-10-CM

## 2025-02-20 RX ORDER — CELECOXIB 100 MG/1
100 CAPSULE ORAL 2 TIMES DAILY PRN
Qty: 60 CAPSULE | Refills: 1 | Status: SHIPPED | OUTPATIENT
Start: 2025-02-20

## 2025-02-20 RX ORDER — METHOCARBAMOL 750 MG/1
750 TABLET, FILM COATED ORAL
Qty: 30 TABLET | Refills: 2 | Status: SHIPPED | OUTPATIENT
Start: 2025-02-20

## 2025-02-20 NOTE — LETTER
2025    Raul Payneo   1970        To Whom it May Concern;    Raul was seen in my clinic for a medical condition. Due to this condition, he is unable to sit for long periods of time. He needs to be able to stand and walk from a seated position every 30 minutes. He is in the process of planning for a procedure for this medical condition, and will require appointments for followup as well. 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    Sincerely,        Ceasar Orantes MD

## 2025-02-20 NOTE — LETTER
2/20/2025      RE: Raul Oates  [address]  [facility] [address]     Dear Colleague,    Thank you for referring your patient, Raul Oates, to the [facility] [place]. Please see a copy of my visit note below.    HISTORY OF PRESENT ILLNESS  Mr. Oates is a pleasant 54 year old year old male who presents to clinic today with the following:  What problem are you here for? Low back pain follow up, right hip pain    He states the pain is over the right posterior hip and lateral hip.Patient reports pain when sitting for long periods of time, can tolerate sitting for 20-30 minutes before the pain comes on. Patient also reports pain with walking.     How long have you had this problem? Chronic    Have you had any recent imaging of this problem? Xrays/MRI/CT scans? XR Right Hip 4/28/22, MRI Lumbar spine 2/23/22    Have you had treatments for this problem in the past?  -Medications? Tylenol, Ibuprofen which are NOT helpful  -Physical therapy? No  -Injections? Yes, Lumbar KOURTNEY 10/12/2020  -Surgery? No    How severe is this problem today? 0-10 scale? 8/10    Does this problem or its symptoms cause difficulty for you falling asleep or staying asleep? Yes    Fax: [phone] for letter        MEDICAL HISTORY  Patient Active Problem List   Diagnosis     Hyperlipidemia LDL goal <160     History of total hip arthroplasty, left     Essential hypertension, benign     Hypertension     Morbid obesity (H)     Acute pain of right knee       Current Outpatient Medications   Medication Sig Dispense Refill     amLODIPine-valsartan (EXFORGE) 5-320 MG tablet Take 1 tablet by mouth daily 90 tablet 3     atorvastatin (LIPITOR) 20 MG tablet Take 1 tablet (20 mg) by mouth daily 90 tablet 3     tamsulosin (FLOMAX) 0.4 MG capsule TAKE 1 CAPSULE(0.4 MG) BY MOUTH DAILY (Patient not taking: Reported on 1/19/2024) 90 capsule 0       Allergies   Allergen Reactions     No Known Allergies        Family  History   Problem Relation Age of Onset     Unknown/Adopted Mother      Coronary Artery Disease Maternal Grandfather      Unknown/Adopted Father      Social History     Socioeconomic History     Marital status: Single   Tobacco Use     Smoking status: Never     Smokeless tobacco: Never   Substance and Sexual Activity     Alcohol use: No     Alcohol/week: 0.0 standard drinks of alcohol     Drug use: No     Sexual activity: Not Currently   Social History Narrative    Works in [employer] at the [employer]     Social Drivers of Health     Financial Resource Strain: Low Risk  (3/29/2024)    Financial Resource Strain      Within the past 12 months, have you or your family members you live with been unable to get utilities (heat, electricity) when it was really needed?: No   Food Insecurity: Low Risk  (3/29/2024)    Food Insecurity      Within the past 12 months, did you worry that your food would run out before you got money to buy more?: No      Within the past 12 months, did the food you bought just not last and you didn t have money to get more?: No   Transportation Needs: Low Risk  (3/29/2024)    Transportation Needs      Within the past 12 months, has lack of transportation kept you from medical appointments, getting your medicines, non-medical meetings or appointments, work, or from getting things that you need?: No   Interpersonal Safety: Low Risk  (3/29/2024)    Interpersonal Safety      Do you feel physically and emotionally safe where you currently live?: Yes      Within the past 12 months, have you been hit, slapped, kicked or otherwise physically hurt by someone?: No      Within the past 12 months, have you been humiliated or emotionally abused in other ways by your partner or ex-partner?: No   Housing Stability: High Risk (3/29/2024)    Housing Stability      Do you have housing? : Yes      Are you worried about losing your housing?: Yes       Additional medical/Social/Surgical histories  reviewed in [facility] and updated as appropriate.     REVIEW OF SYSTEMS (2/20/2025)  10 point ROS of systems including Constitutional, Eyes, Respiratory, Cardiovascular, Gastroenterology, Genitourinary, Integumentary, Musculoskeletal, Psychiatric, Allergic/Immunologic were all negative except for pertinent positives noted in my HPI.     PHYSICAL EXAM  VSS  General  - normal appearance, in no obvious distress  HEENT  - conjunctivae not injected, moist mucous membranes, normocephalic/atraumatic head, ears normal appearance, no lesions, mouth normal appearance, no scars, normal dentition and teeth present  CV  - normal femoral pulse  Pulm  - normal respiratory pattern, non-labored  Musculoskeletal - right hip  - stance: gait slightly favors affected side, no obvious leg length discrepancy, normal heel and toe walk  - inspection: no swelling or effusion,  normal bone and joint alignment, no obvious deformity  - palpation: no lateral or anterior hip tenderness  - ROM: limited flexion and internal rotation secondary to pain, pain with passive and active ROM   - strength: 5/5 in all planes  - special tests:  (-) DAISY  (-) FADIR  no pain with axial femoral load  Neuro  - no sensory or motor deficit, grossly normal coordination, normal muscle tone  Skin  - no ecchymosis, erythema, warmth, or induration, no obvious rash  Psych  - interactive, appropriate, normal mood and affect   ASSESSMENT & PLAN  53 yo male with severe right hip arthritis and pain    I independently reviewed the following imaging studies:  Right hip xray: shows arthritis, severe  Discussed referral to hip surgeon  Given letter for accommodations for sitting  Followup PRN  Patient has been doing home exercise physical therapy program for this problem      Appropriate PPE was utilized for prevention of spread of Covid-19.  Ceasar rOantes MD, CAQSM      Again, thank you for allowing me to participate in the care of your patient.      Sincerely,    Ceasar Wagoner  MD Rolanda

## 2025-02-20 NOTE — PROGRESS NOTES
HISTORY OF PRESENT ILLNESS  Mr. Oates is a pleasant 54 year old year old male who presents to clinic today with the following:  What problem are you here for? Low back pain follow up, right hip pain    He states the pain is over the right posterior hip and lateral hip.Patient reports pain when sitting for long periods of time, can tolerate sitting for 20-30 minutes before the pain comes on. Patient also reports pain with walking.     How long have you had this problem? Chronic    Have you had any recent imaging of this problem? Xrays/MRI/CT scans? XR Right Hip 4/28/22, MRI Lumbar spine 2/23/22    Have you had treatments for this problem in the past?  -Medications? Tylenol, Ibuprofen which are NOT helpful  -Physical therapy? No  -Injections? Yes, Lumbar KOURTNEY 10/12/2020  -Surgery? No    How severe is this problem today? 0-10 scale? 8/10    Does this problem or its symptoms cause difficulty for you falling asleep or staying asleep? Yes    Fax: [phone] for letter        MEDICAL HISTORY  Patient Active Problem List   Diagnosis    Hyperlipidemia LDL goal <160    History of total hip arthroplasty, left    Essential hypertension, benign    Hypertension    Morbid obesity (H)    Acute pain of right knee       Current Outpatient Medications   Medication Sig Dispense Refill    amLODIPine-valsartan (EXFORGE) 5-320 MG tablet Take 1 tablet by mouth daily 90 tablet 3    atorvastatin (LIPITOR) 20 MG tablet Take 1 tablet (20 mg) by mouth daily 90 tablet 3    tamsulosin (FLOMAX) 0.4 MG capsule TAKE 1 CAPSULE(0.4 MG) BY MOUTH DAILY (Patient not taking: Reported on 1/19/2024) 90 capsule 0       Allergies   Allergen Reactions    No Known Allergies        Family History   Problem Relation Age of Onset    Unknown/Adopted Mother     Coronary Artery Disease Maternal Grandfather     Unknown/Adopted Father      Social History     Socioeconomic History    Marital status: Single   Tobacco Use    Smoking status: Never    Smokeless tobacco:  Never   Substance and Sexual Activity    Alcohol use: No     Alcohol/week: 0.0 standard drinks of alcohol    Drug use: No    Sexual activity: Not Currently   Social History Narrative    Works in [employer] at the [employer]     Social Drivers of Health     Financial Resource Strain: Low Risk  (3/29/2024)    Financial Resource Strain     Within the past 12 months, have you or your family members you live with been unable to get utilities (heat, electricity) when it was really needed?: No   Food Insecurity: Low Risk  (3/29/2024)    Food Insecurity     Within the past 12 months, did you worry that your food would run out before you got money to buy more?: No     Within the past 12 months, did the food you bought just not last and you didn t have money to get more?: No   Transportation Needs: Low Risk  (3/29/2024)    Transportation Needs     Within the past 12 months, has lack of transportation kept you from medical appointments, getting your medicines, non-medical meetings or appointments, work, or from getting things that you need?: No   Interpersonal Safety: Low Risk  (3/29/2024)    Interpersonal Safety     Do you feel physically and emotionally safe where you currently live?: Yes     Within the past 12 months, have you been hit, slapped, kicked or otherwise physically hurt by someone?: No     Within the past 12 months, have you been humiliated or emotionally abused in other ways by your partner or ex-partner?: No   Housing Stability: High Risk (3/29/2024)    Housing Stability     Do you have housing? : Yes     Are you worried about losing your housing?: Yes       Additional medical/Social/Surgical histories reviewed in [facility] and updated as appropriate.     REVIEW OF SYSTEMS (2/20/2025)  10 point ROS of systems including Constitutional, Eyes, Respiratory, Cardiovascular, Gastroenterology, Genitourinary, Integumentary, Musculoskeletal, Psychiatric, Allergic/Immunologic were all negative except for  pertinent positives noted in my HPI.     PHYSICAL EXAM  VSS  General  - normal appearance, in no obvious distress  HEENT  - conjunctivae not injected, moist mucous membranes, normocephalic/atraumatic head, ears normal appearance, no lesions, mouth normal appearance, no scars, normal dentition and teeth present  CV  - normal femoral pulse  Pulm  - normal respiratory pattern, non-labored  Musculoskeletal - right hip  - stance: gait slightly favors affected side, no obvious leg length discrepancy, normal heel and toe walk  - inspection: no swelling or effusion,  normal bone and joint alignment, no obvious deformity  - palpation: no lateral or anterior hip tenderness  - ROM: limited flexion and internal rotation secondary to pain, pain with passive and active ROM   - strength: 5/5 in all planes  - special tests:  (-) DAISY  (-) FADIR  no pain with axial femoral load  Neuro  - no sensory or motor deficit, grossly normal coordination, normal muscle tone  Skin  - no ecchymosis, erythema, warmth, or induration, no obvious rash  Psych  - interactive, appropriate, normal mood and affect   ASSESSMENT & PLAN  53 yo male with severe right hip arthritis and pain    I independently reviewed the following imaging studies:  Right hip xray: shows arthritis, severe  Discussed referral to hip surgeon  Given letter for accommodations for sitting  Followup PRN  Patient has been doing home exercise physical therapy program for this problem      Appropriate PPE was utilized for prevention of spread of Covid-19.  Ceasar Orantes MD, CAQSM

## 2025-03-25 ENCOUNTER — ORDERS ONLY (AUTO-RELEASED) (OUTPATIENT)
Dept: INTERNAL MEDICINE | Facility: CLINIC | Age: 55
End: 2025-03-25

## 2025-03-25 ENCOUNTER — LAB (OUTPATIENT)
Dept: LAB | Facility: CLINIC | Age: 55
End: 2025-03-25
Payer: COMMERCIAL

## 2025-03-25 ENCOUNTER — OFFICE VISIT (OUTPATIENT)
Dept: INTERNAL MEDICINE | Facility: CLINIC | Age: 55
End: 2025-03-25
Payer: COMMERCIAL

## 2025-03-25 VITALS
SYSTOLIC BLOOD PRESSURE: 147 MMHG | HEIGHT: 64 IN | WEIGHT: 267.1 LBS | HEART RATE: 113 BPM | TEMPERATURE: 98 F | OXYGEN SATURATION: 95 % | BODY MASS INDEX: 45.6 KG/M2 | RESPIRATION RATE: 18 BRPM | DIASTOLIC BLOOD PRESSURE: 92 MMHG

## 2025-03-25 DIAGNOSIS — E11.69 TYPE 2 DIABETES MELLITUS WITH OTHER SPECIFIED COMPLICATION, WITHOUT LONG-TERM CURRENT USE OF INSULIN (H): ICD-10-CM

## 2025-03-25 DIAGNOSIS — I10 HYPERTENSION, UNSPECIFIED TYPE: ICD-10-CM

## 2025-03-25 DIAGNOSIS — Z11.59 NEED FOR HEPATITIS C SCREENING TEST: ICD-10-CM

## 2025-03-25 DIAGNOSIS — Z11.4 SCREENING FOR HIV (HUMAN IMMUNODEFICIENCY VIRUS): ICD-10-CM

## 2025-03-25 DIAGNOSIS — E78.5 HYPERLIPIDEMIA LDL GOAL <100: ICD-10-CM

## 2025-03-25 DIAGNOSIS — Z12.11 SCREEN FOR COLON CANCER: ICD-10-CM

## 2025-03-25 DIAGNOSIS — R94.31 ABNORMAL ELECTROCARDIOGRAM: ICD-10-CM

## 2025-03-25 DIAGNOSIS — S81.802A OPEN WOUND OF LOWER LIMB, LEFT, INITIAL ENCOUNTER: ICD-10-CM

## 2025-03-25 DIAGNOSIS — R74.8 ALKALINE PHOSPHATASE ELEVATION: ICD-10-CM

## 2025-03-25 DIAGNOSIS — R73.03 PRE-DIABETES: ICD-10-CM

## 2025-03-25 DIAGNOSIS — L03.115 CELLULITIS OF RIGHT LOWER EXTREMITY: ICD-10-CM

## 2025-03-25 DIAGNOSIS — E78.5 HYPERLIPIDEMIA LDL GOAL <160: ICD-10-CM

## 2025-03-25 DIAGNOSIS — Z12.11 SCREEN FOR COLON CANCER: Primary | ICD-10-CM

## 2025-03-25 DIAGNOSIS — R07.9 CHEST PAIN, UNSPECIFIED TYPE: ICD-10-CM

## 2025-03-25 DIAGNOSIS — Z00.00 ENCOUNTER FOR PREVENTIVE CARE: ICD-10-CM

## 2025-03-25 DIAGNOSIS — I10 PRIMARY HYPERTENSION: ICD-10-CM

## 2025-03-25 DIAGNOSIS — I10 HYPERTENSION: ICD-10-CM

## 2025-03-25 LAB
ALBUMIN SERPL BCG-MCNC: 4.2 G/DL (ref 3.5–5.2)
ALP SERPL-CCNC: 169 U/L (ref 40–150)
ALT SERPL W P-5'-P-CCNC: 26 U/L (ref 0–70)
ANION GAP SERPL CALCULATED.3IONS-SCNC: 12 MMOL/L (ref 7–15)
AST SERPL W P-5'-P-CCNC: 30 U/L (ref 0–45)
ATRIAL RATE - MUSE: 102 BPM
BILIRUB SERPL-MCNC: 0.7 MG/DL
BUN SERPL-MCNC: 14.9 MG/DL (ref 6–20)
CALCIUM SERPL-MCNC: 9.5 MG/DL (ref 8.8–10.4)
CHLORIDE SERPL-SCNC: 101 MMOL/L (ref 98–107)
CHOLEST SERPL-MCNC: 202 MG/DL
CREAT SERPL-MCNC: 0.94 MG/DL (ref 0.67–1.17)
DIASTOLIC BLOOD PRESSURE - MUSE: NORMAL MMHG
EGFRCR SERPLBLD CKD-EPI 2021: >90 ML/MIN/1.73M2
EST. AVERAGE GLUCOSE BLD GHB EST-MCNC: 157 MG/DL
FASTING STATUS PATIENT QL REPORTED: YES
FASTING STATUS PATIENT QL REPORTED: YES
GLUCOSE SERPL-MCNC: 125 MG/DL (ref 70–99)
HBA1C MFR BLD: 7.1 %
HCO3 SERPL-SCNC: 26 MMOL/L (ref 22–29)
HCV AB SERPL QL IA: NONREACTIVE
HDLC SERPL-MCNC: 54 MG/DL
INTERPRETATION ECG - MUSE: NORMAL
LDLC SERPL CALC-MCNC: 127 MG/DL
NONHDLC SERPL-MCNC: 148 MG/DL
P AXIS - MUSE: 32 DEGREES
POTASSIUM SERPL-SCNC: 4.6 MMOL/L (ref 3.4–5.3)
PR INTERVAL - MUSE: 152 MS
PROT SERPL-MCNC: 8 G/DL (ref 6.4–8.3)
QRS DURATION - MUSE: 60 MS
QT - MUSE: 320 MS
QTC - MUSE: 417 MS
R AXIS - MUSE: -20 DEGREES
SODIUM SERPL-SCNC: 139 MMOL/L (ref 135–145)
SYSTOLIC BLOOD PRESSURE - MUSE: NORMAL MMHG
T AXIS - MUSE: 10 DEGREES
TRIGL SERPL-MCNC: 106 MG/DL
VENTRICULAR RATE- MUSE: 102 BPM

## 2025-03-25 PROCEDURE — 90471 IMMUNIZATION ADMIN: CPT | Performed by: NURSE PRACTITIONER

## 2025-03-25 PROCEDURE — 90677 PCV20 VACCINE IM: CPT | Performed by: NURSE PRACTITIONER

## 2025-03-25 PROCEDURE — 82977 ASSAY OF GGT: CPT | Performed by: NURSE PRACTITIONER

## 2025-03-25 PROCEDURE — 93000 ELECTROCARDIOGRAM COMPLETE: CPT | Performed by: NURSE PRACTITIONER

## 2025-03-25 PROCEDURE — 80053 COMPREHEN METABOLIC PANEL: CPT | Performed by: PATHOLOGY

## 2025-03-25 PROCEDURE — 3080F DIAST BP >= 90 MM HG: CPT | Performed by: NURSE PRACTITIONER

## 2025-03-25 PROCEDURE — 99000 SPECIMEN HANDLING OFFICE-LAB: CPT | Performed by: PATHOLOGY

## 2025-03-25 PROCEDURE — 80061 LIPID PANEL: CPT | Performed by: PATHOLOGY

## 2025-03-25 PROCEDURE — 90472 IMMUNIZATION ADMIN EACH ADD: CPT | Performed by: NURSE PRACTITIONER

## 2025-03-25 PROCEDURE — 86803 HEPATITIS C AB TEST: CPT | Performed by: NURSE PRACTITIONER

## 2025-03-25 PROCEDURE — 3077F SYST BP >= 140 MM HG: CPT | Performed by: NURSE PRACTITIONER

## 2025-03-25 PROCEDURE — 87389 HIV-1 AG W/HIV-1&-2 AB AG IA: CPT | Performed by: NURSE PRACTITIONER

## 2025-03-25 PROCEDURE — 36415 COLL VENOUS BLD VENIPUNCTURE: CPT | Performed by: PATHOLOGY

## 2025-03-25 PROCEDURE — 90715 TDAP VACCINE 7 YRS/> IM: CPT | Performed by: NURSE PRACTITIONER

## 2025-03-25 PROCEDURE — 83036 HEMOGLOBIN GLYCOSYLATED A1C: CPT | Performed by: NURSE PRACTITIONER

## 2025-03-25 RX ORDER — DOXYCYCLINE 100 MG/1
100 CAPSULE ORAL 2 TIMES DAILY
Qty: 20 CAPSULE | Refills: 0 | Status: SHIPPED | OUTPATIENT
Start: 2025-03-25

## 2025-03-25 NOTE — PROGRESS NOTES
"Myles Carter is a 54 year old, presenting for the following health issues:  Trauma (Right leg wound)      3/25/2025     3:41 PM   Additional Questions   Roomed by KTR     History of Present Illness       Reason for visit:  I bumped my shin and it looks aful  Symptom onset:  1-3 days ago  Symptom intensity:  Mild  Symptom progression:  Staying the same  Had these symptoms before:  No  What makes it worse:  No  What makes it better:  Not really He is missing 3 dose(s) of medications per week.         Objective    BP (!) 147/92 (BP Location: Right arm, Patient Position: Sitting, Cuff Size: Adult Large)   Pulse 113   Temp 98  F (36.7  C) (Oral)   Resp 18   Ht 1.636 m (5' 4.4\")   Wt 121.2 kg (267 lb 1.6 oz)   SpO2 95%   BMI 45.28 kg/m    Body mass index is 45.28 kg/m .    Signed Electronically by: Joana Jiang NP    "

## 2025-03-25 NOTE — PATIENT INSTRUCTIONS
To schedule a lab appointment at the [facility], please call [phone].

## 2025-03-25 NOTE — PROGRESS NOTES
Assessment and Plan   Mr. Oates is a 54 year old male with history of HTN, HLD, pre-diabetes, basal cell carcinoma left anterior shoulder status post surgical excision, nephrolithiasis seen to re-establish care and for several concerns    3/27/25 Addendum:  ECG automated interpretation shows change from prior ECG 2 years ago showing anterior and inferior MI, age undetermined. Patient notified via phone and MyChart. Instructed to proceed to ED immediately with any recurrence of chest pain. Emergency cardiology referral placed. Instructed to chew a 300 mg ASA, avoid smoking, alcohol, strenuous activity. Lab order stat troponin placed. JOSH Rossi    3/26/25 Addendum:    A1c 7.1 indicating T2DM. Starting metformin 500 mg daily and will increase to 1000mg in 2 weeks.  Tchol and LDL not yet at goal. Increasing atorvastatin to 40 mg/day.  Lifestyle recommendations to decrease carbs, sat fats, alcohol, increase exercise/activity  Follow-up in one month. Emphasized importance of taking his medications daily.   Offer clinic care coordination.  JOSH Rossi    (R07.9) Chest pain, unspecified type  Comment: Intermittently recurring chest discomfort, unclear whether with activity or at rest, ongoing over past year. Evaluated by cardiology 12/23. On atorvastatin 20 mg daily. Coronary CT angiogram was ordered but not completed. Several cardiovascular risk factors, family medical history unknown. 12 lead today in clinic with heavy artifact shows tachycardia without acute changes and comparable to the 2023 study.  Plan: EKG 12-lead complete w/read - Clinics  CT Angiogram coronary artery     (I10) Hypertension  Comment: Above goal. On amlodipine-valsartan 5-320 mg daily. Missing antihypertensives several times per week.     Plan: Comprehensive metabolic panel (BMP + Alb, Alk Phos, ALT, AST, Total. Bili, TP)  Advised closer medication adherence    (E78.5) Hyperlipidemia LDL goal <160  Comment: On statin. Updating labs  Plan: Lipid  panel reflex to direct LDL Non-fasting    (R73.03) Pre-diabetes  Comment: Most recent A1C 6.4. Not currently on a diabetes agent. Updating labs  Plan: Hemoglobin A1c    (L03.115) Cellulitis of right lower extremity  (S81.802A) Open wound of lower limb, left, initial encounter  Comment: Cellulitis most likely.  Cannot exclude peripheral vascular disease   Plan: doxycycline hyclate (VIBRAMYCIN) 100 MG capsule BID X 10 days  Miscellaneous DME Supply Order - compression stockings  Schedule follow-up if worsening or no improvement - may nee wound care referral     (Z12.11) Screen for colon cancer  (primary encounter diagnosis)  Plan: COLOGUARD(EXACT SCIENCES)  (Z11.4) Screening for HIV (human immunodeficiency virus)  Plan: HIV Screening  (Z11.59) Need for hepatitis C screening test  Plan: Hepatitis C Screen Reflex to HCV RNA Quant and Genotype    (Z00.00) Encounter for preventive care  Plan: REVIEW OF HEALTH MAINTENANCE PROTOCOL ORDERS,   TDAP 10-64Y (ADACEL,BOOSTRIX),   Pneumococcal 20 Valent Conjugate (PCV20)    Lipids:   Recent Labs   Lab Test 03/29/24  1255 12/21/23  1155   CHOL 180 211*   HDL 51 54   LDL 93 138*  140*   TRIG 179* 93     ASCVD: The 10-year ASCVD risk score (Damaris DK, et al., 2019) is: 4.9%    Values used to calculate the score:      Age: 54 years      Sex: Male      Is Non- : No      Diabetic: No      Tobacco smoker: No      Systolic Blood Pressure: 124 mmHg      Is BP treated: Yes      HDL Cholesterol: 51 mg/dL      Total Cholesterol: 180 mg/dL  DM:   Lab Results   Component Value Date    A1C 6.4 03/29/2024    A1C 6.3 10/27/2023    A1C 6.4 01/11/2023    A1C 5.8 06/02/2021    A1C 5.7 10/12/2020     PSA (50-75 yrs):   PSA   Date Value Ref Range Status   10/16/2020 1.68 0 - 4 ug/L Final     Comment:     Assay Method:  Chemiluminescence using Siemens Vista analyzer     Prostate Specific Antigen Screen   Date Value Ref Range Status   01/11/2023 3.03 0.00 - 3.50 ng/mL Final       Return to clinic/Follow-up:  As needed and with no improvement, worsening, or new symptom development.     Options for treatment and follow-up care were reviewed with the patient. He engaged in the decision making process and verbalized understanding of the options discussed and agreed with the final plan.    I spent a total of 45 minutes in the care of this pt today, including time prior to, during, and following today's office visit. This time includes reviewing the patient's chart and prior history, external notes, obtaining a history, performing an examination, interpreting test results, and evaluation and counseling the patient. This time also includes ordering medications or tests necessary in addition to communication to other member's of the patient's health care team. Time spent in documentation and care coordination is included.    Joana Jiang, ANP, AGACNP, APRN, CNP  Nurse Practitioner    __________________________    Subjective   Presenting Concern:    Mr. Oates is a 54 year old male with history of HTN, HLD, pre-diabetes, basal cell carcinoma left anterior shoulder status post surgical excision, nephrolithiasis. H presents to re-establish care.   Seen 3/29/24, PHILIP Chance      Concerns today:  Leg injury: Bumped his right shin on a table at work. Photo on phone shows large area of erythema, open sores. Thinks originally it was bruised, then opened up. Works in [employer]. Currently off work. Slight pain, poking sensation, rated 5-6/10.  Not taking analgesia. Denies fever, numbness. Causes him to limp slightly    Hypertension:   on amlodipine-valsartan 5-320 mg daily.  He denies side effects with this medication.    Sometimes misses 1-2 days with his BP medication    BP Readings from Last 6 Encounters:   03/25/25 (!) 147/92   03/29/24 124/87   01/08/24 (!) 182/77   12/21/23 123/72   11/10/23 (!) 149/84   10/27/23 (!) 167/109       Dyslipidemia / Chest pain:   Occasional chest discomfort,  heart races  No concerns today    Seen by cardiology 12/23 for repetitive chest pain, presence of several cardiovascular risk factors, family medical history unknown. Started on atorvastatin 20 mg daily   Coronary CT angiogram ordered - not completed  Recent Labs   Lab Test 03/29/24  1255 12/21/23  1155   CHOL 180 211*   HDL 51 54   LDL 93 138*  140*   TRIG 179* 93     Pre-diabetes:  Not on diabetes agents  Lab Results   Component Value Date    A1C 6.4 03/29/2024    A1C 6.3 10/27/2023    A1C 6.4 01/11/2023    A1C 5.8 06/02/2021    A1C 5.7 10/12/2020     Prevention:  Lipids, A1C, CMP  Colonoscopy  Vaccines:  Pneumococcal, Influenza, Covid, TDap     Review of external notes as documented above     Past Medical History:  Past Medical History:   Diagnosis Date    Epistaxis     Groin mass     Hip pain     Hypertension        Active Meds:  Current Outpatient Medications   Medication Sig Dispense Refill    amLODIPine-valsartan (EXFORGE) 5-320 MG tablet Take 1 tablet by mouth daily 90 tablet 3    atorvastatin (LIPITOR) 20 MG tablet Take 1 tablet (20 mg) by mouth daily 90 tablet 3    celecoxib (CELEBREX) 100 MG capsule Take 1 capsule (100 mg) by mouth 2 times daily as needed for moderate pain. 60 capsule 1    methocarbamol (ROBAXIN) 750 MG tablet Take 1 tablet (750 mg) by mouth nightly as needed for muscle spasms. 30 tablet 2    tamsulosin (FLOMAX) 0.4 MG capsule TAKE 1 CAPSULE(0.4 MG) BY MOUTH DAILY (Patient not taking: Reported on 2/20/2025) 90 capsule 0     No current facility-administered medications for this visit.        Allergies:  Reviewed, refer to EMR    A full 10-pt Review of Systems was performed, verified and is negative except as documented in the HPI.  All health questionnaires were reviewed, verified and relevant information documented above.    Objective    BP (!) 147/92 (BP Location: Right arm, Patient Position: Sitting, Cuff Size: Adult Large)   Pulse 113   Temp 98  F (36.7  C) (Oral)   Resp 18   Ht  "1.636 m (5' 4.4\")   Wt 121.2 kg (267 lb 1.6 oz)   SpO2 95%   BMI 45.28 kg/m      Physical Exam   General appearance: alert, well appearing, and in no distress  Eyes: extra-ocular movements intact, pupils equally round and reactive bilaterally, no scleral icterus  Ears: bilateral TM's and external ear canals normal  Nose: normal and patent, no erythema, discharge or polyps  Mouth: mucous membranes moist. No oral lesions. Good dentition.  Neck: no significant adenopathy, lymphadenopathy, JVD  Respiratory: Good air movement bilaterally, lungs clear  Cardiovascular: normal rate and regular rhythm, S1 and S2 normal, no murmurs, rubs or gallops, no peripheral edema  GI: Abdomen soft, bowel sounds present, nontender  Musculoskeletal: Atalactic gait. Normal muscle tone  Neurological: cranial nerves II through XII grossly intact, no tremors, strength 5/5 throughout, sensation to light touch intact  Extremities: feet warm, DP pulses intact  Skin:  R shin with several open sores, surrounding erythema. Total area 5X10 cm or more  Psychiatric: normal mentation, affect and mood    Answers submitted by the patient for this visit:  General Questionnaire (Submitted on 3/25/2025)  Chief Complaint: Chronic problems general questions HPI Form  How many days per week do you miss taking your medication?: 3  General Concern (Submitted on 3/25/2025)  Chief Complaint: Chronic problems general questions HPI Form  What is the reason for your visit today?: i bumped my shin and it looks aful  When did your symptoms begin?: 1-3 days ago  How would you describe these symptoms?: Mild  Are your symptoms:: Staying the same  Have you had these symptoms before?: No  Is there anything that makes you feel worse?: no  Is there anything that makes you feel better?: not really  Questionnaire about: Chronic problems general questions HPI Form (Submitted on 3/25/2025)  Chief Complaint: Chronic problems general questions HPI Form    "

## 2025-03-26 LAB
GGT SERPL-CCNC: 30 U/L (ref 8–61)
HIV 1+2 AB+HIV1 P24 AG SERPL QL IA: NONREACTIVE

## 2025-03-26 RX ORDER — ATORVASTATIN CALCIUM 40 MG/1
40 TABLET, FILM COATED ORAL DAILY
Qty: 90 TABLET | Refills: 3 | Status: SHIPPED | OUTPATIENT
Start: 2025-03-26 | End: 2026-03-21

## 2025-03-27 ENCOUNTER — TELEPHONE (OUTPATIENT)
Dept: INTERNAL MEDICINE | Facility: CLINIC | Age: 55
End: 2025-03-27

## 2025-03-27 ENCOUNTER — TELEPHONE (OUTPATIENT)
Dept: CARDIOLOGY | Facility: CLINIC | Age: 55
End: 2025-03-27

## 2025-03-27 ENCOUNTER — LAB (OUTPATIENT)
Dept: LAB | Facility: CLINIC | Age: 55
End: 2025-03-27
Payer: COMMERCIAL

## 2025-03-27 DIAGNOSIS — E11.69 TYPE 2 DIABETES MELLITUS WITH OTHER SPECIFIED COMPLICATION, WITHOUT LONG-TERM CURRENT USE OF INSULIN (H): ICD-10-CM

## 2025-03-27 DIAGNOSIS — I10 PRIMARY HYPERTENSION: Primary | ICD-10-CM

## 2025-03-27 DIAGNOSIS — R94.31 ABNORMAL ELECTROCARDIOGRAM: ICD-10-CM

## 2025-03-27 DIAGNOSIS — L03.115 CELLULITIS OF RIGHT LOWER EXTREMITY: ICD-10-CM

## 2025-03-27 DIAGNOSIS — M16.11 ARTHRITIS OF RIGHT HIP: Primary | ICD-10-CM

## 2025-03-27 LAB — TROPONIN T SERPL HS-MCNC: 14 NG/L

## 2025-03-27 NOTE — TELEPHONE ENCOUNTER
----- Message from Joana Jiang sent at 3/26/2025  1:07 PM CDT -----  Regarding: Phone call - labs, Diabetes, F/U appointment  Raul Lagos is a new patient as of Tuesday.  His labs show T2DM, chol not at goal, elvated liver ALP. In addition hs BP is elevated, he needs to schedule a CT Coronary Angiogram, and he has a nasty wound on his right shin. Not sure he'll process all of this without some help.  Can you please give him a call?    Here's the plan:   1) Increase atorvastatin to 40 mg   2) Start metformin 500 mg with largest meal of the day; increase to 1000 mg in 2 weeks  3) Follow-up appointment with me in one month.  4) please remind him to take his BP medication  5) please remind him to take the antibiotic for his leg wound  6) please help him schedule Coronary CT angiogram (ordered bu cardiology over one year ago and not scheduled)  7) repeat fasting ALP in one month, preferably before he sees me  8) recheck A1c in 3 months  9) if his leg wound gets worse he should come in sooner to have it evaluated  10) please offer him clinic care coordination, at least until we can get him on track with all of this new information and management needs.    Thanks!  Joana

## 2025-03-27 NOTE — TELEPHONE ENCOUNTER
Left a message for patient to contact the clinic to discuss patients recent labs and Joana Jiang's recommendations.     Rosalina Morrow RN on 3/27/2025 at 12:53 PM

## 2025-03-27 NOTE — TELEPHONE ENCOUNTER
Patient confirmed scheduled appointment:  Date: 3/27/25  Time: 115pm  Visit type: LAB  Provider: EDWARDO  Location: [address] [place] [address]  Testing/imaging: -  Additional notes: pt confirmed same day appt    Patient confirmed scheduled appointment:  Date: 5/16/25  Time: 1200pm  Visit type: CT Coronary  Provider: PCP  Location: [address] [place] [address]  Testing/imaging: -  Additional notes: this was next available - ordered STAT

## 2025-03-27 NOTE — TELEPHONE ENCOUNTER
Patient Contacted for the patient to call back and schedule the following:    Appointment type: Return Cardio  Provider: Yeo  Return date: 5/21/25  Specialty phone number: [phone] opt 1  Additional appointment(s) needed: CT Angio prior  Additonal Notes: Harriet template full ok to schedule with another provider or SHAGUFTA by Zara WILSON

## 2025-03-27 NOTE — TELEPHONE ENCOUNTER
Left Voicemail (1st Attempt) for the patient to call back and schedule the following:    Appointment type:  urgent   Provider: yeo   Return date: 04/09/25  Specialty phone number: [phone] opt 1   Additional appointment(s) needed:   Additonal Notes: n/a

## 2025-03-27 NOTE — TELEPHONE ENCOUNTER
Health Call Center    Phone Message    May a detailed message be left on voicemail: yes     Reason for Call: Appointment Intake    Referring Provider Name:     Joana Jiang NP in [facility]     Diagnosis and/or Symptoms: Abnormal electrocardiogram [R94.31]     Please review emergent referral and call pt to schedule, per no provider availability in time frame requested.     Action Taken: Message routed to:  [facility]    Travel Screening: Not Applicable     Date of Service:

## 2025-04-23 NOTE — TELEPHONE ENCOUNTER
Spoke to patient regarding his appointment with Joana Jiang on 3/25/25.      1) Increase atorvastatin to 40 mg  - has been taking this.    2) Start metformin 500 mg with largest meal of the day; increase to 1000 mg in 2 weeks - has not picked up the medication yet, plans to this week.     3) Follow-up appointment with me in one month. - Will call back to schedule - per patient he does not currently have a job so he has not insurance at this time    4) please remind him to take his BP medication - taking his medication    5) please remind him to take the antibiotic for his leg wound - took the medication    6) please help him schedule Coronary CT angiogram (ordered bu cardiology over one year ago and not scheduled) - Scheduled for 5/16/25    7) repeat fasting ALP in one month, preferably before he sees me - Patient is aware    8) recheck A1c in 3 months - patient is aware    9) if his leg wound gets worse he should come in sooner to have it evaluated - patient stated the leg wound was improving, denies pain and no drainage    10) please offer him clinic care coordination, at least until we can get him on track with all of this new information and management needs. - patient was agreeable to care coordination - referral placed.     Rosalina Morrow RN on 4/23/2025 at 12:52 PM

## 2025-04-25 ENCOUNTER — PATIENT OUTREACH (OUTPATIENT)
Dept: CARE COORDINATION | Facility: CLINIC | Age: 55
End: 2025-04-25
Payer: COMMERCIAL

## 2025-04-25 NOTE — PROGRESS NOTES
Clinic Care Coordination Contact    Situation: Patient chart reviewed by care coordinator.    Background: Referred by RN for Complex Medical Concerns/Education related to Compliance with Medical Treatment Plan on 4/25/25.     Assessment: See below note from PCP with items patient needs support with.     Plan/Recommendations: RN will call patient to discuss care coordination support.    PETER ENGLAND RN on 4/25/2025 at 9:52 AM        Raul is a new patient as of Tuesday.  His labs show T2DM, chol not at goal, elvated liver ALP. In addition hs BP is elevated, he needs to schedule a CT Coronary Angiogram, and he has a nasty wound on his right shin. Not sure he'll process all of this without some help.  Can you please give him a call?          Spoke to patient regarding his appointment with Joana Jiang on 3/25/25.       1) Increase atorvastatin to 40 mg  - has been taking this.     2) Start metformin 500 mg with largest meal of the day; increase to 1000 mg in 2 weeks - has not picked up the medication yet, plans to this week.      3) Follow-up appointment with me in one month. - Will call back to schedule - per patient he does not currently have a job so he has not insurance at this time     4) please remind him to take his BP medication - taking his medication     5) please remind him to take the antibiotic for his leg wound - took the medication     6) please help him schedule Coronary CT angiogram (ordered bu cardiology over one year ago and not scheduled) - Scheduled for 5/16/25     7) repeat fasting ALP in one month, preferably before he sees me - Patient is aware     8) recheck A1c in 3 months - patient is aware     9) if his leg wound gets worse he should come in sooner to have it evaluated - patient stated the leg wound was improving, denies pain and no drainage     10) please offer him clinic care coordination, at least until we can get him on track with all of this new information and management needs. -  patient was agreeable to care coordination - referral placed.

## 2025-04-25 NOTE — LETTER
PRIMARY CARE CARE COORDINATION   [facility]  [address] [place] [address]    April 30, 2025    Raul Oates  [address]  [facility] [address]      Dear Raul,        I am a clinic care coordinator who works with Joana Jiang NP with the Primary Care clinic at the [facility] I wanted to introduce myself and provide you with my contact information for you to be able to call me with any questions or concerns. I wanted to thank you for spending the time to talk with me.  Below is a description of clinic care coordination and how I can further assist you.       The clinic care coordination team is made up of a registered nurse, , and community health worker who understand the health care system. The goal of clinic care coordination is to help you manage your health and improve access to the health care system. Our team works alongside your provider to assist you in determining your health and social needs. We can help you obtain health care and community resources, providing you with necessary information and education. We can work with you through any barriers and develop a care plan that helps coordinate and strengthen the communication between you and your care team. Our services are voluntary and are offered without charge to you personally.    Please feel free to contact me with any questions or concerns regarding care coordination and what we can offer.      We are focused on providing you with the highest-quality healthcare experience possible.    Sincerely,     Arianna NEGRO RN Care Manager  [facility]   Clinic Phone: [phone]  Direct Phone: [phone]

## 2025-04-30 NOTE — PROGRESS NOTES
RN called and spoke with patient. RN offered CC, patient again accepted. Pt shared that he was recently fired, so currently uninsured. He is aware (after speaking with Rebecca) about the items from Joana that need to be completed for his health. Pt's primary goal is insurance. RN shared that writer could provide patient with resources for [employer], resources for job hunting, pt declined and states he is already applying and has it covered. Pt states that in a week, if he does not hear from jobs or is not finding one, he will call back to get information on [employer]. Pt also aware to call when he gets insured so writer can help with his health maintenance items identified by PCP. RN provided direct number for callback and will send CC letter via [place] as discussed with pt. RN will plan for enrollment in 1 week. Will send MyChart in 1 week if does not receive call from pt at that time to check in.     PETER ENGLAND RN on 4/30/2025 at 12:14 PM

## 2025-04-30 NOTE — ADDENDUM NOTE
Addended by: MIKHAIL LONDONO on: 3/27/2025 06:21 AM     Modules accepted: Orders     Checking labs today

## 2025-05-06 ENCOUNTER — PATIENT OUTREACH (OUTPATIENT)
Dept: CARE COORDINATION | Facility: CLINIC | Age: 55
End: 2025-05-06
Payer: COMMERCIAL

## 2025-05-06 NOTE — LETTER
PRIMARY CARE CARE COORDINATION   [facility] [place]  [address] [place] [address]    May 7, 2025    Raul Oates  [address]  [facility] [address]      Dear Raul,        I am a clinic care coordinator who works with Joana Jiang NP with the Primary Care clinic at the [facility] I wanted to introduce myself and provide you with my contact information for you to be able to call me with any questions or concerns. I wanted to thank you for spending the time to talk with me.  Below is a description of clinic care coordination and how I can further assist you.       The clinic care coordination team is made up of a registered nurse, , and community health worker who understand the health care system. The goal of clinic care coordination is to help you manage your health and improve access to the health care system. Our team works alongside your provider to assist you in determining your health and social needs. We can help you obtain health care and community resources, providing you with necessary information and education. We can work with you through any barriers and develop a care plan that helps coordinate and strengthen the communication between you and your care team. Our services are voluntary and are offered without charge to you personally.    Please feel free to contact me with any questions or concerns regarding care coordination and what we can offer.      We are focused on providing you with the highest-quality healthcare experience possible.    Sincerely,     Arianna NEGRO RN Care Manager  [facility]   Clinic Phone: [phone]  Direct Phone: [phone]    Additional Information: I have enclosed a copy of a 24 Hour Access Plan. This has helpful phone numbers for you to call when needed. Please keep this in an easy to access place to use as needed.  I have enclosed a copy of the Patient Centered Plan of Care. This has helpful  information and goals that we have talked about. Please keep this in an easy to access place to use as needed.

## 2025-05-06 NOTE — LETTER
PRIMARY CARE CARE COORDINATION   - CLINICS AND SURGERY CENTER  Patient Centered Plan of Care  About Me:        Patient Name:  Raul Oates    YOB: 1970  Age:         54 year old   Tish MRN:    [id number] Telephone Information:  Home Phone [phone]   Mobile [phone]       Address:  [address]  [facility] [address] Email address:  [email or web address]      Emergency Contact(s)    Name Relationship Lgl Grd Work Phone Home Phone Mobile Phone   KEEGAN LAUGHLIN Daughter    [phone]           Primary language:  English     needed? No   [employer]:  [phone] op. 1  Other communication barriers:None    Preferred Method of Communication:  Mail  Current living arrangement: I live in a private home    Mobility Status/ Medical Equipment: Independent        Health Maintenance  Health Maintenance Reviewed: Due/Overdue   Health Maintenance Due   Topic Date Due    DIABETIC FOOT EXAM  Never done    ADVANCE CARE PLANNING  Never done    EYE EXAM  Never done    YEARLY PREVENTIVE VISIT  Never done    COLORECTAL CANCER SCREENING  Never done    HEPATITIS B IMMUNIZATION (1 of 3 - 19+ 3-dose series) Never done    ZOSTER IMMUNIZATION (1 of 2) Never done    COVID-19 Vaccine (6 - 2024-25 season) 09/01/2024    MICROALBUMIN  03/29/2025           My Access Plan  Medical Emergency 911   Primary Clinic Line [phone] to speak with clinic staff or schedule appointment   24 Hour Nurse Line [phone] (toll-free)   Preferred Urgent Care No data recorded             My Care Team Members  Patient Care Team         Relationship Specialty Notifications Start End    Joana Jiang NP PCP - General Nurse Practitioner - Adult Health  3/25/25     Phone: [phone] Fax: [phone]          [facility] 4th floor [facility] [address]    No Ref-Primary, Physician PCP - *   10/9/24     Fax: [phone]         Ceasar Orantes MD MD  Family Medicine - Sports Medicine  8/4/15     Phone: [phone] Fax: [phone]         [address] [facility] [address]    Ciaran Meyer MD MD Urology  10/20/20     Phone: [phone] Fax: [phone]         [address] [facility] [address]    Dylan Marquez MD MD Dermatology  10/30/23     Phone: [phone] Fax: [phone]          [facility] [address]    Jacob Torres MD MD Urology  12/26/23     Phone: [phone] Fax: [phone]         [address] [facility] [address]    Jonathon Larios MD Assigned Heart and Vascular Provider   1/6/24     Phone: [phone] Pager: [phone] Fax: [phone]        [address] [facility] [address]    Zara Briscoe RN Specialty Care Coordinator Cardiology Admissions 1/18/24     Paul Gilmore MD Assigned Dermatology Provider   3/23/25     Phone: [phone] Fax: [phone]         [address] [facility] [address]    Jacob Torres MD Assigned Surgical Provider   3/23/25     Phone: [phone] Fax: [phone]         [address] [facility] [address]    Ceasar Orantes MD Assigned Musculoskeletal Provider   3/23/25     Phone: [phone] Fax: [phone]         [address] [facility] [address]    Joana Jiang NP Assigned PCP   4/23/25     Phone: [phone] Fax: [phone]         [address] [facility] 4th floor [facility] [address]    Arianna Bueno, RN Lead Care Coordinator Primary Care - CC Admissions 4/24/25     Joy Stevens CHW Community Health Worker Primary Care - CC Admissions 5/7/25                 My Care Plans  Self Management and Treatment Plan    Care Plan  Care Plan: Financial Wellbeing       Problem: Patient expresses financial resource strain       Goal: Create an action plan to increase financial stability       Start Date: 5/7/2025    This Visit's Progress: On track    Priority: High    Note:      Barriers: patient has no insurance, recently lost job  Strengths: patient is trying to find a new job  Patient expressed understanding of goal: yes  Action steps to achieve this goal:  1. I will work to find a job so I can get insured.   2. I will let care coordinator know if cannot get a job, so that I can get resources regarding applying for [employer] insurance.  3. I will let my care coordinator know if I do get insured, so that I can start medications and schedule appointments.   4. If my health worsens during this time, I will seek immediate care in the ER.   5. I will inform my care team of any resources I need during this time, including [facility] for medical bills, prescription resources, community resources for utilities, etc.                                        My Medical and Care Information  Problem List   Patient Active Problem List   Diagnosis    Hyperlipidemia LDL goal <160    History of total hip arthroplasty, left    Essential hypertension, benign    Hypertension    Morbid obesity (H)    Acute pain of right knee      Current Medications and Allergies:      Current Outpatient Medications:     amLODIPine-valsartan (EXFORGE) 5-320 MG tablet, Take 1 tablet by mouth daily, Disp: 90 tablet, Rfl: 3    atorvastatin (LIPITOR) 40 MG tablet, Take 1 tablet (40 mg) by mouth daily., Disp: 90 tablet, Rfl: 3    celecoxib (CELEBREX) 100 MG capsule, Take 1 capsule (100 mg) by mouth 2 times daily as needed for moderate pain., Disp: 60 capsule, Rfl: 1    doxycycline hyclate (VIBRAMYCIN) 100 MG capsule, Take 1 capsule (100 mg) by mouth 2 times daily., Disp: 20 capsule, Rfl: 0    metFORMIN (GLUCOPHAGE) 500 MG tablet, Take 2 tablets (1,000 mg) by mouth 2 times daily (with meals). START 500 mg/day with largest meal of the day. Increase to 1000mg daily after two weeks., Disp: 360 tablet, Rfl: 0    methocarbamol (ROBAXIN) 750 MG tablet, Take 1 tablet (750 mg) by mouth nightly as needed for muscle spasms., Disp: 30 tablet,  Rfl: 2    tamsulosin (FLOMAX) 0.4 MG capsule, TAKE 1 CAPSULE(0.4 MG) BY MOUTH DAILY (Patient not taking: Reported on 1/19/2024), Disp: 90 capsule, Rfl: 0       Allergies   Allergen Reactions    No Known Allergies          Care Coordination Start Date: 4/23/2025   Frequency of Care Coordination: 2 weeks, more frequently as needed     Form Last Updated: 05/07/2025

## 2025-05-06 NOTE — PROGRESS NOTES
Clinic Care Coordination Contact    Please see previous outreach encounter- RN has not heard an update from patient, [employer] message sent to patient to check in as planned. RN will await pt's reply.     PETER ENGLAND RN on 5/6/2025 at 10:22 AM    RN received VM from patient stating that needed to get the phone number for [employer] as is trying to apply to a job at [employer]. RN attempted to call patient back to provide the following resources- call rang and then was declined/hung up.     [phone]  [email or web address]     RN will send [employer] with this information and try to call patient again tomorrow.     PETER ENGLAND RN on 5/6/2025 at 2:54 PM    RN called and spoke with patient. RN provided patient with the above information for calling to speak to someone about the job posting. Pt took information, has no further concerns. RN will continue to await for pt's outreach on updates and needs for help with insurance and appointments.     PETER ENGLAND RN on 5/7/2025 at 9:35 AM

## 2025-05-07 ASSESSMENT — ACTIVITIES OF DAILY LIVING (ADL): DEPENDENT_IADLS:: INDEPENDENT

## 2025-05-07 NOTE — PROGRESS NOTES
Clinic Care Coordination Contact  Clinic Care Coordination Contact  OUTREACH    Referral Information:  Referral Source: PCP    Primary Diagnosis: Diabetes    Chief Complaint   Patient presents with    Clinic Care Coordination - Initial        Universal Utilization: 27.3% Admission or ED Risk     Clinic Utilization  Difficulty keeping appointments:: Yes  Compliance Concerns: Yes  No-Show Concerns: No  No PCP office visit in Past Year: No  Utilization      No Show Count (past year)  0             ED Visits  0             Hospital Admissions  0                    Current as of: 5/6/2025  3:24 PM                Clinical Concerns:  Current Medical Concerns:  Pt recently discussed concerns for medical needs with RNRebecca with Joana Jiang. This is all on hold while pt is un-insured.   Current Behavioral Concerns: Please see recent outreach encounters- pt declines help with insurance or resources at the moment as wants to find a job first to get insured   Education Provided to patient: See as above.    Pain  Pain (GOAL):: No  Health Maintenance Reviewed: Due/Overdue   Health Maintenance Due   Topic Date Due    DIABETIC FOOT EXAM  Never done    ADVANCE CARE PLANNING  Never done    EYE EXAM  Never done    YEARLY PREVENTIVE VISIT  Never done    COLORECTAL CANCER SCREENING  Never done    HEPATITIS B IMMUNIZATION (1 of 3 - 19+ 3-dose series) Never done    ZOSTER IMMUNIZATION (1 of 2) Never done    COVID-19 Vaccine (6 - 2024-25 season) 09/01/2024    MICROALBUMIN  03/29/2025       Clinical Pathway: - not addressed at this time, pt wants to focus on job search during calls    Medication Management:  Medication review status: Medications reviewed and no changes reported per patient.           Functional Status:  Dependent ADLs:: Independent  Dependent IADLs:: Independent  Bed or wheelchair confined:: No  Mobility Status: Independent  Fallen 2 or more times in the past year?: No  Any fall with injury in the past year?: No    Living  Situation:  Current living arrangement:: I live in a private home    Lifestyle & Psychosocial Needs:    Social Drivers of Health     Food Insecurity: Low Risk  (3/29/2024)    Food Insecurity     Within the past 12 months, did you worry that your food would run out before you got money to buy more?: No     Within the past 12 months, did the food you bought just not last and you didn t have money to get more?: No   Depression: Not at risk (2/20/2025)    PHQ-2     PHQ-2 Score: 0   Housing Stability: High Risk (3/29/2024)    Housing Stability     Do you have housing? : Yes     Are you worried about losing your housing?: Yes   Tobacco Use: Low Risk  (3/25/2025)    Patient History     Smoking Tobacco Use: Never     Smokeless Tobacco Use: Never     Passive Exposure: Not on file   Financial Resource Strain: Low Risk  (3/29/2024)    Financial Resource Strain     Within the past 12 months, have you or your family members you live with been unable to get utilities (heat, electricity) when it was really needed?: No   Alcohol Use: Not on file   Transportation Needs: Low Risk  (3/29/2024)    Transportation Needs     Within the past 12 months, has lack of transportation kept you from medical appointments, getting your medicines, non-medical meetings or appointments, work, or from getting things that you need?: No   Physical Activity: Not on file   Interpersonal Safety: Low Risk  (3/25/2025)    Interpersonal Safety     Do you feel physically and emotionally safe where you currently live?: Yes     Within the past 12 months, have you been hit, slapped, kicked or otherwise physically hurt by someone?: No     Within the past 12 months, have you been humiliated or emotionally abused in other ways by your partner or ex-partner?: No   Stress: Not on file   Social Connections: Not on file   Health Literacy: Not on file     Diet:: Diabetic diet  Inadequate nutrition (GOAL):: No  Tube Feeding: No  Inadequate activity/exercise (GOAL)::  No  Significant changes in sleep pattern (GOAL): No  Transportation means:: Regular car                 Resources and Interventions:  Current Resources:      Community Resources: None  Supplies Currently Used at Home: None  Equipment Currently Used at Home: none  Employment Status: unemployed (recently fired, pt is trying to find a new job)         Advance Care Plan/Directive  Advanced Care Plans/Directives on file:: No  Discussed with patient/caregiver:: Declined Further Information    Referrals Placed: None    Care Plan:  Care Plan: Financial Wellbeing       Problem: Patient expresses financial resource strain       Goal: Create an action plan to increase financial stability       Start Date: 5/7/2025    This Visit's Progress: On track    Priority: High    Note:     Barriers: patient has no insurance, recently lost job  Strengths: patient is trying to find a new job  Patient expressed understanding of goal: yes  Action steps to achieve this goal:  1. I will work to find a job so I can get insured.   2. I will let care coordinator know if cannot get a job, so that I can get resources regarding applying for [employer] insurance.  3. I will let my care coordinator know if I do get insured, so that I can start medications and schedule appointments.   4. If my health worsens during this time, I will seek immediate care in the ER.   5. I will inform my care team of any resources I need during this time, including [facility] for medical bills, prescription resources, community resources for utilities, etc.                                 Patient/Caregiver understanding: Patient states understanding. Patient has no further questions or concerns regarding above assessment. Patient is aware to call the clinic and reach out to the care team with any further questions.       Outreach Frequency: 2 weeks, more frequently as needed  Future Appointments                In 1 week UU CV CT NURSE; [address] [facility],  [place]    In 2 weeks Yeo, Ilhwan, MD [facility]            Plan: RN will assign CHW to outreach in 2 weeks to reach out to patient and try to assist with prescription resources, insurance, [facility], utility resources as needed. CHW can inform RN when these goals have been met and patient would like to try and work towards medical needs. CHW to let writer know if not meeting social needs in order to work on medical needs.      PETER ENGLAND RN on 5/7/2025 at 9:43 AM

## 2025-06-17 ENCOUNTER — PATIENT OUTREACH (OUTPATIENT)
Dept: CARE COORDINATION | Facility: CLINIC | Age: 55
End: 2025-06-17

## 2025-06-17 NOTE — Clinical Note
ALFRED Booth that we have closed Raul- he wanted to find a job (we did try to help with that) and preferred to get insurance through employer versus getting it through the state. He shared once he was employed he would reach out to us for help with getting things scheduled. He has our numbers and resources for state insurance if needed- we will await for him to reach out to us at this point for help with his appointments moving forward.  Thank you! Arianna

## 2025-06-17 NOTE — PROGRESS NOTES
Clinic Care Coordination Contact    Situation: Patient chart reviewed by care coordinator.    Background: Patient enrolled in CC to assist with appointment management and insurance navigation.     Assessment: Writer and CHW have been working to help patient get insurance. Pt preferred to get it through employer versus [employer]. Pt was going to call clinic when received insurance. Pt has not reached out since May- has been reading [employer] but not replying.     Plan/Recommendations: RN sent resources for [employer]. Pt has phone numbers for both RN EWA and CHW as well as clinic. RN will await for pt to reach out once insured or if needs help at all with insurance. FYI to PCP.     PETER ENGLAND RN on 6/17/2025 at 9:46 AM

## 2025-07-12 ENCOUNTER — HEALTH MAINTENANCE LETTER (OUTPATIENT)
Age: 55
End: 2025-07-12

## (undated) RX ORDER — TRIAMCINOLONE ACETONIDE 40 MG/ML
INJECTION, SUSPENSION INTRA-ARTICULAR; INTRAMUSCULAR
Status: DISPENSED
Start: 2020-08-21

## (undated) RX ORDER — METHYLPREDNISOLONE ACETATE 80 MG/ML
INJECTION, SUSPENSION INTRA-ARTICULAR; INTRALESIONAL; INTRAMUSCULAR; SOFT TISSUE
Status: DISPENSED
Start: 2020-10-12

## (undated) RX ORDER — LIDOCAINE HYDROCHLORIDE 10 MG/ML
INJECTION, SOLUTION EPIDURAL; INFILTRATION; INTRACAUDAL; PERINEURAL
Status: DISPENSED
Start: 2020-08-21

## (undated) RX ORDER — BUPIVACAINE HYDROCHLORIDE 5 MG/ML
INJECTION, SOLUTION EPIDURAL; INTRACAUDAL
Status: DISPENSED
Start: 2020-10-12

## (undated) RX ORDER — LIDOCAINE HYDROCHLORIDE 10 MG/ML
INJECTION, SOLUTION EPIDURAL; INFILTRATION; INTRACAUDAL; PERINEURAL
Status: DISPENSED
Start: 2020-10-12